# Patient Record
Sex: MALE | Race: ASIAN | NOT HISPANIC OR LATINO
[De-identification: names, ages, dates, MRNs, and addresses within clinical notes are randomized per-mention and may not be internally consistent; named-entity substitution may affect disease eponyms.]

---

## 2017-04-25 ENCOUNTER — APPOINTMENT (OUTPATIENT)
Dept: ELECTROPHYSIOLOGY | Facility: CLINIC | Age: 72
End: 2017-04-25

## 2017-10-26 ENCOUNTER — APPOINTMENT (OUTPATIENT)
Dept: ELECTROPHYSIOLOGY | Facility: CLINIC | Age: 72
End: 2017-10-26

## 2018-01-10 ENCOUNTER — APPOINTMENT (OUTPATIENT)
Dept: ELECTROPHYSIOLOGY | Facility: CLINIC | Age: 73
End: 2018-01-10

## 2018-01-24 ENCOUNTER — APPOINTMENT (OUTPATIENT)
Dept: ELECTROPHYSIOLOGY | Facility: CLINIC | Age: 73
End: 2018-01-24
Payer: MEDICARE

## 2018-01-24 VITALS — SYSTOLIC BLOOD PRESSURE: 140 MMHG | HEART RATE: 60 BPM | DIASTOLIC BLOOD PRESSURE: 88 MMHG

## 2018-01-24 PROCEDURE — 93279 PRGRMG DEV EVAL PM/LDLS PM: CPT

## 2018-04-06 ENCOUNTER — EMERGENCY (EMERGENCY)
Facility: HOSPITAL | Age: 73
LOS: 1 days | Discharge: HOME | End: 2018-04-06
Attending: EMERGENCY MEDICINE
Payer: COMMERCIAL

## 2018-04-06 VITALS
TEMPERATURE: 99 F | DIASTOLIC BLOOD PRESSURE: 92 MMHG | SYSTOLIC BLOOD PRESSURE: 153 MMHG | WEIGHT: 130.95 LBS | OXYGEN SATURATION: 98 % | HEART RATE: 60 BPM | HEIGHT: 62 IN | RESPIRATION RATE: 20 BRPM

## 2018-04-06 DIAGNOSIS — Z95.0 PRESENCE OF CARDIAC PACEMAKER: Chronic | ICD-10-CM

## 2018-04-06 DIAGNOSIS — I25.10 ATHEROSCLEROTIC HEART DISEASE OF NATIVE CORONARY ARTERY WITHOUT ANGINA PECTORIS: Chronic | ICD-10-CM

## 2018-04-06 DIAGNOSIS — Z90.49 ACQUIRED ABSENCE OF OTHER SPECIFIED PARTS OF DIGESTIVE TRACT: Chronic | ICD-10-CM

## 2018-04-06 PROCEDURE — 70486 CT MAXILLOFACIAL W/O DYE: CPT

## 2018-04-06 PROCEDURE — 70486 CT MAXILLOFACIAL W/O DYE: CPT | Mod: 26

## 2018-04-06 PROCEDURE — 99284 EMERGENCY DEPT VISIT MOD MDM: CPT | Mod: 25

## 2018-04-06 PROCEDURE — 70450 CT HEAD/BRAIN W/O DYE: CPT | Mod: 26

## 2018-04-06 PROCEDURE — 72125 CT NECK SPINE W/O DYE: CPT

## 2018-04-06 PROCEDURE — 72125 CT NECK SPINE W/O DYE: CPT | Mod: 26

## 2018-04-06 PROCEDURE — 70450 CT HEAD/BRAIN W/O DYE: CPT

## 2018-04-06 PROCEDURE — 99284 EMERGENCY DEPT VISIT MOD MDM: CPT

## 2018-04-06 NOTE — ED PROVIDER NOTE - CARE PLAN
Principal Discharge DX:	Abrasion of face, initial encounter  Secondary Diagnosis:	Sidewalk as place of occurrence of external cause  Secondary Diagnosis:	Fall, initial encounter

## 2018-04-06 NOTE — ED PROVIDER NOTE - MEDICAL DECISION MAKING DETAILS
pt is well-appearing, sustained upper lip abrasion vs. laceration listed in triage placed Bacitracin, on daily Aspirin b/l dry blood to b/l nares but no active epistaxis. No nasal bridge tenderness or facial laceration. Will CT head, XR maxillofacial and reassess. pt is well-appearing, sustained upper lip abrasion vs. laceration listed in triage placed Bacitracin, on daily Aspirin, dry blood to b/l nares no active epistaxis.  Will CT head, XR maxillofacial and reassess. No signs basilar skull, no extremity bony tenderness.

## 2018-04-06 NOTE — ED PROVIDER NOTE - NS ED MD EM SELECTION
FYI: Gretel called to cancel all her schedule B12 injections because she is having issues with her insurance [id number] Detailed

## 2018-04-06 NOTE — ED PROVIDER NOTE - PHYSICAL EXAMINATION
Abrasion to the upper lip, dry blood in the b/l nares. Steady gait, hips and pelvis are stable, -b/l log roll of hips, b/l wrist non-tender with full ROM, no cervical/midline tenderness, no facial crepitus, no hemotympanum, - raccoon eyes 1.0cm Abrasion to the upper lip, dry blood in the b/l nares L>R. Steady gait, hips and pelvis are stable, -b/l log roll of hips, b/l wrist non-tender with full ROM, no cervical/midline tenderness, no facial crepitus, no hemotympanum, - raccoon eyes

## 2018-04-06 NOTE — ED PROVIDER NOTE - PROGRESS NOTE DETAILS
Cleaned/irrigated and placed bacitracin to abrasion, instructed not to pick at dried blood, use nasal saline, gave copies of CT results, instructed to follow up with PMD with copies. Steady gait at time of discharge.

## 2018-04-06 NOTE — ED PROVIDER NOTE - ATTENDING CONTRIBUTION TO CARE
male with trip and fall.   PE:  abrassion on upper lip.  no other injuires, NC/AT,  epistaxsis; resolved. no neck pain, S1S2, no r/g/r.  A/P:  fall/abrassion . CT and tentative Pt is well appearing walking with normal gait, stable for discharge and follow up with medical doctor. Pt educated on care and need for follow up. Discussed anticipatory guidance and return precautions. Questions answered..  TD is WNL.

## 2018-04-06 NOTE — ED PROVIDER NOTE - OBJECTIVE STATEMENT
73 y/o M pt with h/o HLD, HTN, coronary artery bypass surgery , on daily Asprin, presents s/p mechanical fall today. Pt states he tripped on a pothole outside falling forward, sustaining a laceration to the upper lip, also had a nosebleed which resolved PTA. Pt denies any LOC, lightheadedness, nausea, nasal congestion or any other complaints. Pt states he is UTD with his Tetanus. NKDA. 71 y/o M pt with h/o HLD, HTN, coronary artery bypass surgery , on daily Asprin, presents s/p mechanical fall today. Pt states he tripped on a pothole outside falling forward, sustaining a laceration to the upper lip, also had a nosebleed which resolved PTA. Pt denies any LOC, lightheadedness, nausea, nasal congestion or any other complaints. Pt states he is UTD with his Tetanus. Allergic to Penicillin. 73 y/o M pt with h/o HLD, HTN, coronary artery bypass surgery , on daily Asprin, presents s/p mechanical fall today. Pt states he tripped on a pothole outside falling forward, sustaining a laceration to the upper lip, also had a nosebleed which resolved PTA. Pt denies any LOC, lightheadedness, neck/back/extremity pain, nausea/vomiting, nasal congestion or any other complaints. Pt states he is UTD with his Tetanus. Allergic to Penicillin.

## 2018-04-06 NOTE — ED PROVIDER NOTE - PSH
Coronary artery disease  CABG x 3 in 1994 in [place]  Pacemaker  see medical for information on pacemaker  S/P cholecystectomy

## 2018-04-06 NOTE — ED PROVIDER NOTE - PMH
BPH (benign prostatic hypertrophy)    Coronary artery disease    CVA (cerebral vascular accident)  10-15 years ago -- No residual  Difficult intubation  patient has notation with pacemaker information that he is a difficult intubation but no specifics given  Hyperlipidemia    Hypertension    Sinoatrial node dysfunction  guidant pacemaker  [employer] SSIRO IS-1, Comp, 1198, [id number]  leads Bipolar 3.2 LP Passive RV 52, 59 cm, 4260, [id number]  implanted oon 11/21/05 by Dr. Clark at [facility]  Snoring  SWATI precautions -- responds affirmatively to STOP BANG questionnaire -- admits to intermittent snoring; age > 50; gender, male; h/o htn  Vasovagal syncope

## 2018-06-19 ENCOUNTER — INPATIENT (INPATIENT)
Facility: HOSPITAL | Age: 73
LOS: 3 days | Discharge: HOME HEALTH | End: 2018-06-23
Attending: GENERAL PRACTICE | Admitting: GENERAL PRACTICE
Payer: MEDICARE

## 2018-06-19 ENCOUNTER — APPOINTMENT (OUTPATIENT)
Dept: ELECTROPHYSIOLOGY | Facility: CLINIC | Age: 73
End: 2018-06-19
Payer: MEDICARE

## 2018-06-19 VITALS
TEMPERATURE: 98 F | HEART RATE: 85 BPM | DIASTOLIC BLOOD PRESSURE: 79 MMHG | SYSTOLIC BLOOD PRESSURE: 109 MMHG | RESPIRATION RATE: 18 BRPM | OXYGEN SATURATION: 98 %

## 2018-06-19 VITALS — SYSTOLIC BLOOD PRESSURE: 88 MMHG | DIASTOLIC BLOOD PRESSURE: 57 MMHG | RESPIRATION RATE: 16 BRPM | HEART RATE: 60 BPM

## 2018-06-19 DIAGNOSIS — Z90.49 ACQUIRED ABSENCE OF OTHER SPECIFIED PARTS OF DIGESTIVE TRACT: Chronic | ICD-10-CM

## 2018-06-19 DIAGNOSIS — I21.3 ST ELEVATION (STEMI) MYOCARDIAL INFARCTION OF UNSPECIFIED SITE: ICD-10-CM

## 2018-06-19 DIAGNOSIS — I25.10 ATHEROSCLEROTIC HEART DISEASE OF NATIVE CORONARY ARTERY WITHOUT ANGINA PECTORIS: Chronic | ICD-10-CM

## 2018-06-19 DIAGNOSIS — Z95.0 PRESENCE OF CARDIAC PACEMAKER: Chronic | ICD-10-CM

## 2018-06-19 LAB
ALBUMIN SERPL ELPH-MCNC: 4.1 G/DL — SIGNIFICANT CHANGE UP (ref 3.3–5)
ALP SERPL-CCNC: 66 U/L — SIGNIFICANT CHANGE UP (ref 40–120)
ALT FLD-CCNC: 33 U/L — SIGNIFICANT CHANGE UP (ref 4–41)
APTT BLD: 32.2 SEC — SIGNIFICANT CHANGE UP (ref 27.5–37.4)
AST SERPL-CCNC: 125 U/L — HIGH (ref 4–40)
BASOPHILS # BLD AUTO: 0.02 K/UL — SIGNIFICANT CHANGE UP (ref 0–0.2)
BASOPHILS NFR BLD AUTO: 0.2 % — SIGNIFICANT CHANGE UP (ref 0–2)
BILIRUB SERPL-MCNC: 2.9 MG/DL — HIGH (ref 0.2–1.2)
BLD GP AB SCN SERPL QL: NEGATIVE — SIGNIFICANT CHANGE UP
BUN SERPL-MCNC: 25 MG/DL — HIGH (ref 7–23)
BUN SERPL-MCNC: 27 MG/DL — HIGH (ref 7–23)
CALCIUM SERPL-MCNC: 9 MG/DL — SIGNIFICANT CHANGE UP (ref 8.4–10.5)
CALCIUM SERPL-MCNC: 9 MG/DL — SIGNIFICANT CHANGE UP (ref 8.4–10.5)
CHLORIDE SERPL-SCNC: 96 MMOL/L — LOW (ref 98–107)
CHLORIDE SERPL-SCNC: 99 MMOL/L — SIGNIFICANT CHANGE UP (ref 98–107)
CK MB BLD-MCNC: 32.4 NG/ML — HIGH (ref 1–6.6)
CK MB BLD-MCNC: 4.7 — HIGH (ref 0–2.5)
CK SERPL-CCNC: 685 U/L — HIGH (ref 30–200)
CO2 SERPL-SCNC: 20 MMOL/L — LOW (ref 22–31)
CO2 SERPL-SCNC: 21 MMOL/L — LOW (ref 22–31)
CREAT SERPL-MCNC: 1.43 MG/DL — HIGH (ref 0.5–1.3)
CREAT SERPL-MCNC: 1.55 MG/DL — HIGH (ref 0.5–1.3)
EOSINOPHIL # BLD AUTO: 0.02 K/UL — SIGNIFICANT CHANGE UP (ref 0–0.5)
EOSINOPHIL NFR BLD AUTO: 0.2 % — SIGNIFICANT CHANGE UP (ref 0–6)
GLUCOSE SERPL-MCNC: 90 MG/DL — SIGNIFICANT CHANGE UP (ref 70–99)
GLUCOSE SERPL-MCNC: 98 MG/DL — SIGNIFICANT CHANGE UP (ref 70–99)
HCT VFR BLD CALC: 49.9 % — SIGNIFICANT CHANGE UP (ref 39–50)
HGB BLD-MCNC: 16.6 G/DL — SIGNIFICANT CHANGE UP (ref 13–17)
IMM GRANULOCYTES # BLD AUTO: 0.04 # — SIGNIFICANT CHANGE UP
IMM GRANULOCYTES NFR BLD AUTO: 0.5 % — SIGNIFICANT CHANGE UP (ref 0–1.5)
INR BLD: 1.14 — SIGNIFICANT CHANGE UP (ref 0.88–1.17)
LYMPHOCYTES # BLD AUTO: 0.79 K/UL — LOW (ref 1–3.3)
LYMPHOCYTES # BLD AUTO: 9.2 % — LOW (ref 13–44)
MCHC RBC-ENTMCNC: 28 PG — SIGNIFICANT CHANGE UP (ref 27–34)
MCHC RBC-ENTMCNC: 33.3 % — SIGNIFICANT CHANGE UP (ref 32–36)
MCV RBC AUTO: 84.3 FL — SIGNIFICANT CHANGE UP (ref 80–100)
MONOCYTES # BLD AUTO: 0.43 K/UL — SIGNIFICANT CHANGE UP (ref 0–0.9)
MONOCYTES NFR BLD AUTO: 5 % — SIGNIFICANT CHANGE UP (ref 2–14)
NEUTROPHILS # BLD AUTO: 7.28 K/UL — SIGNIFICANT CHANGE UP (ref 1.8–7.4)
NEUTROPHILS NFR BLD AUTO: 84.9 % — HIGH (ref 43–77)
NRBC # FLD: 0 — SIGNIFICANT CHANGE UP
PLATELET # BLD AUTO: 143 K/UL — LOW (ref 150–400)
PMV BLD: 10.8 FL — SIGNIFICANT CHANGE UP (ref 7–13)
POTASSIUM SERPL-MCNC: 4.4 MMOL/L — SIGNIFICANT CHANGE UP (ref 3.5–5.3)
POTASSIUM SERPL-MCNC: SIGNIFICANT CHANGE UP MMOL/L (ref 3.5–5.3)
POTASSIUM SERPL-SCNC: 4.4 MMOL/L — SIGNIFICANT CHANGE UP (ref 3.5–5.3)
POTASSIUM SERPL-SCNC: SIGNIFICANT CHANGE UP MMOL/L (ref 3.5–5.3)
PROT SERPL-MCNC: 8.4 G/DL — HIGH (ref 6–8.3)
PROTHROM AB SERPL-ACNC: 13.2 SEC — HIGH (ref 9.8–13.1)
RBC # BLD: 5.92 M/UL — HIGH (ref 4.2–5.8)
RBC # FLD: 13.5 % — SIGNIFICANT CHANGE UP (ref 10.3–14.5)
RH IG SCN BLD-IMP: POSITIVE — SIGNIFICANT CHANGE UP
SODIUM SERPL-SCNC: 130 MMOL/L — LOW (ref 135–145)
SODIUM SERPL-SCNC: 135 MMOL/L — SIGNIFICANT CHANGE UP (ref 135–145)
TROPONIN T, HIGH SENSITIVITY RESULT: 2887 NG/L — CRITICAL HIGH (ref ?–14)
WBC # BLD: 8.58 K/UL — SIGNIFICANT CHANGE UP (ref 3.8–10.5)
WBC # FLD AUTO: 8.58 K/UL — SIGNIFICANT CHANGE UP (ref 3.8–10.5)

## 2018-06-19 PROCEDURE — 93279 PRGRMG DEV EVAL PM/LDLS PM: CPT

## 2018-06-19 RX ORDER — HEPARIN SODIUM 5000 [USP'U]/ML
4000 INJECTION INTRAVENOUS; SUBCUTANEOUS ONCE
Qty: 0 | Refills: 0 | Status: COMPLETED | OUTPATIENT
Start: 2018-06-19 | End: 2018-06-19

## 2018-06-19 RX ORDER — HEPARIN SODIUM 5000 [USP'U]/ML
INJECTION INTRAVENOUS; SUBCUTANEOUS
Qty: 25000 | Refills: 0 | Status: DISCONTINUED | OUTPATIENT
Start: 2018-06-19 | End: 2018-06-19

## 2018-06-19 RX ORDER — HEPARIN SODIUM 5000 [USP'U]/ML
4000 INJECTION INTRAVENOUS; SUBCUTANEOUS EVERY 6 HOURS
Qty: 0 | Refills: 0 | Status: DISCONTINUED | OUTPATIENT
Start: 2018-06-19 | End: 2018-06-19

## 2018-06-19 RX ORDER — SODIUM CHLORIDE 9 MG/ML
500 INJECTION INTRAMUSCULAR; INTRAVENOUS; SUBCUTANEOUS
Qty: 0 | Refills: 0 | Status: DISCONTINUED | OUTPATIENT
Start: 2018-06-19 | End: 2018-06-23

## 2018-06-19 RX ORDER — TICAGRELOR 90 MG/1
90 TABLET ORAL
Qty: 0 | Refills: 0 | Status: DISCONTINUED | OUTPATIENT
Start: 2018-06-19 | End: 2018-06-23

## 2018-06-19 RX ORDER — TAMSULOSIN HYDROCHLORIDE 0.4 MG/1
0.4 CAPSULE ORAL AT BEDTIME
Qty: 0 | Refills: 0 | Status: DISCONTINUED | OUTPATIENT
Start: 2018-06-19 | End: 2018-06-23

## 2018-06-19 RX ORDER — SODIUM CHLORIDE 9 MG/ML
3 INJECTION INTRAMUSCULAR; INTRAVENOUS; SUBCUTANEOUS EVERY 8 HOURS
Qty: 0 | Refills: 0 | Status: DISCONTINUED | OUTPATIENT
Start: 2018-06-19 | End: 2018-06-23

## 2018-06-19 RX ORDER — METOPROLOL TARTRATE 50 MG
50 TABLET ORAL DAILY
Qty: 0 | Refills: 0 | Status: DISCONTINUED | OUTPATIENT
Start: 2018-06-19 | End: 2018-06-20

## 2018-06-19 RX ORDER — ATORVASTATIN CALCIUM 80 MG/1
80 TABLET, FILM COATED ORAL AT BEDTIME
Qty: 0 | Refills: 0 | Status: DISCONTINUED | OUTPATIENT
Start: 2018-06-19 | End: 2018-06-23

## 2018-06-19 RX ORDER — SODIUM CHLORIDE 9 MG/ML
500 INJECTION INTRAMUSCULAR; INTRAVENOUS; SUBCUTANEOUS
Qty: 0 | Refills: 0 | Status: DISCONTINUED | OUTPATIENT
Start: 2018-06-19 | End: 2018-06-19

## 2018-06-19 RX ORDER — NITROGLYCERIN 6.5 MG
0.4 CAPSULE, EXTENDED RELEASE ORAL
Qty: 0 | Refills: 0 | Status: DISCONTINUED | OUTPATIENT
Start: 2018-06-19 | End: 2018-06-21

## 2018-06-19 RX ORDER — TICAGRELOR 90 MG/1
180 TABLET ORAL ONCE
Qty: 0 | Refills: 0 | Status: COMPLETED | OUTPATIENT
Start: 2018-06-19 | End: 2018-06-19

## 2018-06-19 RX ORDER — FENTANYL CITRATE 50 UG/ML
25 INJECTION INTRAVENOUS ONCE
Qty: 0 | Refills: 0 | Status: DISCONTINUED | OUTPATIENT
Start: 2018-06-19 | End: 2018-06-19

## 2018-06-19 RX ORDER — AMLODIPINE BESYLATE 2.5 MG/1
5 TABLET ORAL DAILY
Qty: 0 | Refills: 0 | Status: DISCONTINUED | OUTPATIENT
Start: 2018-06-19 | End: 2018-06-20

## 2018-06-19 RX ORDER — ASPIRIN/CALCIUM CARB/MAGNESIUM 324 MG
81 TABLET ORAL DAILY
Qty: 0 | Refills: 0 | Status: DISCONTINUED | OUTPATIENT
Start: 2018-06-20 | End: 2018-06-23

## 2018-06-19 RX ADMIN — FENTANYL CITRATE 25 MICROGRAM(S): 50 INJECTION INTRAVENOUS at 19:04

## 2018-06-19 RX ADMIN — SODIUM CHLORIDE 3 MILLILITER(S): 9 INJECTION INTRAMUSCULAR; INTRAVENOUS; SUBCUTANEOUS at 20:26

## 2018-06-19 RX ADMIN — HEPARIN SODIUM 800 UNIT(S)/HR: 5000 INJECTION INTRAVENOUS; SUBCUTANEOUS at 12:19

## 2018-06-19 RX ADMIN — SODIUM CHLORIDE 75 MILLILITER(S): 9 INJECTION INTRAMUSCULAR; INTRAVENOUS; SUBCUTANEOUS at 16:12

## 2018-06-19 RX ADMIN — ATORVASTATIN CALCIUM 80 MILLIGRAM(S): 80 TABLET, FILM COATED ORAL at 21:48

## 2018-06-19 RX ADMIN — FENTANYL CITRATE 25 MICROGRAM(S): 50 INJECTION INTRAVENOUS at 18:15

## 2018-06-19 RX ADMIN — TAMSULOSIN HYDROCHLORIDE 0.4 MILLIGRAM(S): 0.4 CAPSULE ORAL at 21:48

## 2018-06-19 RX ADMIN — HEPARIN SODIUM 4000 UNIT(S): 5000 INJECTION INTRAVENOUS; SUBCUTANEOUS at 12:19

## 2018-06-19 RX ADMIN — SODIUM CHLORIDE 75 MILLILITER(S): 9 INJECTION INTRAMUSCULAR; INTRAVENOUS; SUBCUTANEOUS at 20:24

## 2018-06-19 RX ADMIN — TICAGRELOR 180 MILLIGRAM(S): 90 TABLET ORAL at 12:18

## 2018-06-19 RX ADMIN — TICAGRELOR 90 MILLIGRAM(S): 90 TABLET ORAL at 21:48

## 2018-06-19 NOTE — ED ADULT NURSE NOTE - OBJECTIVE STATEMENT
Pt rec'd to ED TR B c/o midsternal chest rpessure on exertion x2 days, nonradiating, lasting <5min, improvement with rest. Woke up this morning with nausea, denies vomiting. Arrives to room AOx4, respirations even and unlabored, denying diaphoresis/ CP/ SOB/ N/V. EKG complete at triage- STEMI- cardiology notified and brought stat to bedside. Charge RN made aware. Meds admin as per EMAR. Brought to cath lab on zoll accompanied by ED tech and ISSAC Carpio.

## 2018-06-19 NOTE — ED PROVIDER NOTE - ATTENDING CONTRIBUTION TO CARE
Dr. Nunez: I have personally seen and examined this patient at the bedside. I have fully participated in the care of this patient. I have reviewed all pertinent clinical information, including history, physical exam, plan and the Resident's note and agree except as noted. HPI above as by me. PE above as by me. STEMI PLAN STEMI alert, heparin, cath lab.

## 2018-06-19 NOTE — CHART NOTE - NSCHARTNOTEFT_GEN_A_CORE
In short, called by ED at 11:51 am by ED for EKG concerning of STEMI. Briefly, this is a 72 year old patient with history of CABG 1994, PPM who presents with complaints of chest pain of 2 days. He complaints of exertional chest pain x 2 days. He describes it as midsternal pressure for a few minutes. She denies nausea, SOB, diaphoresis. Patient took 1 baby asa prior to arrival. In the ED, he is without any symptoms, denies any chest pain which last occurred at rest at 3 pm yesterday.   VS with /70, with HR in 70s.   EKG: Sinus rhythm at 60 with LA enlargement, LAD. Patient has CHRISTEN of 1-2 mm in lead II, III, avF with waves. Additionally, flipped T wave with 1-2 mm depression in I and aVL. Flipped T waves in lateral segments.   A/P: 72 year old man with prior CABG with late presenting MI. Chest pain free at the moment and he is more than 24 hours out from his primary injury. He is not a candidate for emergent primary PCI at this time but should undergo invasive testing today. Patient's interventional cardiologist Dr. Casarez notified, who is planning on proceeding with cardiac catheterization today.  -[facility] today  -DAPT load with aspirin and ticagrelor in the ED, heparin gtt started   -Case discussed with Dr. Groves   -Defer rest of plan to Dr. Casarez

## 2018-06-19 NOTE — H&P CARDIOLOGY - NEGATIVE CARDIOVASCULAR SYMPTOMS
no palpitations/no dyspnea on exertion/no paroxysmal nocturnal dyspnea/no peripheral edema/no orthopnea/no claudication

## 2018-06-19 NOTE — ED PROVIDER NOTE - PROGRESS NOTE DETAILS
Mayra att: Per cards fellow EKG reviewed by Interventional Cardiology Att who notes q-waves on EKG and suspects sub-acute MI. Interventional Cardio Att is discussing case with patient's primary Cardiologist. Mayra att: 11:47 STEMI alert activated. EKG faxed to [email or web address]. 12:00 Patient seen by Cards fellow. Fellow walked EKG up to Cards Att. 12:11 Per cards fellow EKG reviewed by Interventional Cardiology Att who notes q-waves on EKG and suspects sub-acute MI. Interventional Cardio Att is discussing case with patient's primary Cardiologist Dr. Casarez (an Interventional Cardiologist). Mayra att: Per House Cards Dr. Casarez en route from [facility] to take patient to cath lab. House Cards suspects patient sustained MI 24 hours and presents cp free today. Dr. Casarez contacted whether patient is CCU admission. Dr. Casarez requests Dr. Lemons be contacted for admission. Mayra att: 11:47 STEMI alert activated. EKG faxed to [email or web address]. 12:00 Patient seen by Cards fellow. Fellow walked EKG up to Cards Att. 12:11 Per cards fellow EKG reviewed by Interventional Cardiology Att who notes q-waves on EKG and suspects sub-acute MI. Interventional Cardio Att is discussing case with patient's primary Cardiologist Dr. Casarez (an Interventional Cardiologist). Patient written for asa, brilinta, and hep gtt by me. Mayra att: Per House Cards Dr. Casarez en route from [facility] to take patient to cath lab. House Cards suspects patient sustained MI 24 hours and as he presents cp free today. Dr. Casarez contacted whether patient is CCU admission. Dr. Casarez requests Dr. Lemons be contacted for admission. [facility] accepted patient.

## 2018-06-19 NOTE — H&P CARDIOLOGY - NEGATIVE NEUROLOGICAL SYMPTOMS
no generalized seizures/no focal seizures/no paresthesias/no syncope/no headache/no weakness/no tremors/no vertigo/no loss of sensation/no loss of consciousness/no hemiparesis/no facial palsy/no difficulty walking/no confusion/no transient paralysis

## 2018-06-19 NOTE — ED PROVIDER NOTE - OBJECTIVE STATEMENT
12:05 Mayra att: 72M h/o 1998 cabg c/o exertional cp  x 2 days. Since yesterday patient notes exertional cp, midsternal, presssure, x 4 min, nonrad, resovles with rest. Denies n, sob, diaphoresis. Today 3A patient woke up from sleep acute nausea. Patient arrived to ER by car, denies active cp. STEMI alert activated upon receipt of EKG for concern inferior wall MI. Patient took 1 baby asa prior to arrival. ALL pcn PMH cad, cabg, ppm PSH cabg 12:05 Mayra att: 72M h/o 1998 cabg c/o exertional cp  x 2 days. Notified by Intake att STEMI ekg, cath consult called. Patient notes since yesterday exertional cp, midsternal, presssure, x 4 min, nonrad, resovles with rest. Denies n, sob, diaphoresis. Today 3A patient woke up from sleep acute nausea. Patient arrived to ER by car, denies active cp. STEMI alert activated upon receipt of EKG for concern inferior wall MI. Patient took 1 baby asa prior to arrival. ALL pcn PMH cad, cabg, ppm PSH cabg

## 2018-06-19 NOTE — H&P CARDIOLOGY - PMH
BPH (benign prostatic hypertrophy)    Coronary artery disease    CVA (cerebral vascular accident)  10-15 years ago -- No residual  Difficult intubation  patient has notation with pacemaker information that he is a difficult intubation but no specifics given  Hyperlipidemia    Hypertension    Sinoatrial node dysfunction  guidant pacemaker  [employer] I [employer]ra SSIRO IS-1, Comp, 1198, [id number]  leads Bipolar 3.2 LP Passive RV 52, 59 cm, 4260, [id number]  implanted oon 11/21/05 by Dr. Clark at [facility]  Snoring  SWATI precautions -- responds affirmatively to STOP BANG questionnaire -- admits to intermittent snoring; age > 50; gender, male; h/o htn  Vasovagal syncope

## 2018-06-19 NOTE — H&P CARDIOLOGY - RS GEN PE MLT RESP DETAILS PC
no chest wall tenderness/respirations non-labored/clear to auscultation bilaterally/breath sounds equal/good air movement/airway patent

## 2018-06-19 NOTE — H&P CARDIOLOGY - HISTORY OF PRESENT ILLNESS
73 y/o M w/ PMH of CABG x3(1998) and PPM c/o exertional chest pain  x 2 days. Today 3AM patient woke up from sleep acute nausea. STEMI alert activated upon receipt of EKG for concern inferior wall MI. Pt seen by cardiology fellow and 2/2 him being chest pain free at the moment and late presentation pt not emergently brought to cath lab. Pt will have angiogram with Dr. Casarez today.

## 2018-06-19 NOTE — ED PROVIDER NOTE - PMH
BPH (benign prostatic hypertrophy)    Coronary artery disease    CVA (cerebral vascular accident)  10-15 years ago -- No residual  Difficult intubation  patient has notation with pacemaker information that he is a difficult intubation but no specifics given  Hyperlipidemia    Hypertension    Sinoatrial node dysfunction  guidant pacemaker  [employer] I Entra SSIRO IS-1, Comp, 1198, [id number]  leads Bipolar 3.2 LP Passive RV 52, 59 cm, 4260, [id number]  implanted oon 11/21/05 by Dr. Clark at [facility]  Snoring  SWATI precautions -- responds affirmatively to STOP BANG questionnaire -- admits to intermittent snoring; age > 50; gender, male; h/o htn  Vasovagal syncope

## 2018-06-20 LAB
BLD GP AB SCN SERPL QL: NEGATIVE — SIGNIFICANT CHANGE UP
BUN SERPL-MCNC: 21 MG/DL — SIGNIFICANT CHANGE UP (ref 7–23)
BUN SERPL-MCNC: 25 MG/DL — HIGH (ref 7–23)
CALCIUM SERPL-MCNC: 8.3 MG/DL — LOW (ref 8.4–10.5)
CALCIUM SERPL-MCNC: 8.6 MG/DL — SIGNIFICANT CHANGE UP (ref 8.4–10.5)
CHLORIDE SERPL-SCNC: 100 MMOL/L — SIGNIFICANT CHANGE UP (ref 98–107)
CHLORIDE SERPL-SCNC: 101 MMOL/L — SIGNIFICANT CHANGE UP (ref 98–107)
CK MB BLD-MCNC: 20.51 NG/ML — HIGH (ref 1–6.6)
CK MB BLD-MCNC: 25.37 NG/ML — HIGH (ref 1–6.6)
CK MB BLD-MCNC: 5.6 — HIGH (ref 0–2.5)
CK SERPL-CCNC: 414 U/L — HIGH (ref 30–200)
CK SERPL-CCNC: 453 U/L — HIGH (ref 30–200)
CO2 SERPL-SCNC: 18 MMOL/L — LOW (ref 22–31)
CO2 SERPL-SCNC: 20 MMOL/L — LOW (ref 22–31)
CREAT SERPL-MCNC: 1.08 MG/DL — SIGNIFICANT CHANGE UP (ref 0.5–1.3)
CREAT SERPL-MCNC: 1.12 MG/DL — SIGNIFICANT CHANGE UP (ref 0.5–1.3)
GLUCOSE SERPL-MCNC: 90 MG/DL — SIGNIFICANT CHANGE UP (ref 70–99)
GLUCOSE SERPL-MCNC: 92 MG/DL — SIGNIFICANT CHANGE UP (ref 70–99)
HCT VFR BLD CALC: 39.6 % — SIGNIFICANT CHANGE UP (ref 39–50)
HCT VFR BLD CALC: 41.8 % — SIGNIFICANT CHANGE UP (ref 39–50)
HCT VFR BLD CALC: 48.9 % — SIGNIFICANT CHANGE UP (ref 39–50)
HGB BLD-MCNC: 13.2 G/DL — SIGNIFICANT CHANGE UP (ref 13–17)
HGB BLD-MCNC: 13.7 G/DL — SIGNIFICANT CHANGE UP (ref 13–17)
HGB BLD-MCNC: 15.9 G/DL — SIGNIFICANT CHANGE UP (ref 13–17)
MAGNESIUM SERPL-MCNC: 2.1 MG/DL — SIGNIFICANT CHANGE UP (ref 1.6–2.6)
MAGNESIUM SERPL-MCNC: 2.1 MG/DL — SIGNIFICANT CHANGE UP (ref 1.6–2.6)
MCHC RBC-ENTMCNC: 27.8 PG — SIGNIFICANT CHANGE UP (ref 27–34)
MCHC RBC-ENTMCNC: 28.3 PG — SIGNIFICANT CHANGE UP (ref 27–34)
MCHC RBC-ENTMCNC: 28.4 PG — SIGNIFICANT CHANGE UP (ref 27–34)
MCHC RBC-ENTMCNC: 32.5 % — SIGNIFICANT CHANGE UP (ref 32–36)
MCHC RBC-ENTMCNC: 32.8 % — SIGNIFICANT CHANGE UP (ref 32–36)
MCHC RBC-ENTMCNC: 33.3 % — SIGNIFICANT CHANGE UP (ref 32–36)
MCV RBC AUTO: 83.4 FL — SIGNIFICANT CHANGE UP (ref 80–100)
MCV RBC AUTO: 86.5 FL — SIGNIFICANT CHANGE UP (ref 80–100)
MCV RBC AUTO: 87 FL — SIGNIFICANT CHANGE UP (ref 80–100)
NRBC # FLD: 0 — SIGNIFICANT CHANGE UP
PHOSPHATE SERPL-MCNC: 2.5 MG/DL — SIGNIFICANT CHANGE UP (ref 2.5–4.5)
PHOSPHATE SERPL-MCNC: 3.1 MG/DL — SIGNIFICANT CHANGE UP (ref 2.5–4.5)
PLATELET # BLD AUTO: 126 K/UL — LOW (ref 150–400)
PLATELET # BLD AUTO: 128 K/UL — LOW (ref 150–400)
PLATELET # BLD AUTO: 131 K/UL — LOW (ref 150–400)
PMV BLD: 10.8 FL — SIGNIFICANT CHANGE UP (ref 7–13)
PMV BLD: 10.9 FL — SIGNIFICANT CHANGE UP (ref 7–13)
PMV BLD: 11 FL — SIGNIFICANT CHANGE UP (ref 7–13)
POTASSIUM SERPL-MCNC: 4.1 MMOL/L — SIGNIFICANT CHANGE UP (ref 3.5–5.3)
POTASSIUM SERPL-MCNC: 4.1 MMOL/L — SIGNIFICANT CHANGE UP (ref 3.5–5.3)
POTASSIUM SERPL-SCNC: 4.1 MMOL/L — SIGNIFICANT CHANGE UP (ref 3.5–5.3)
POTASSIUM SERPL-SCNC: 4.1 MMOL/L — SIGNIFICANT CHANGE UP (ref 3.5–5.3)
RBC # BLD: 4.75 M/UL — SIGNIFICANT CHANGE UP (ref 4.2–5.8)
RBC # BLD: 4.83 M/UL — SIGNIFICANT CHANGE UP (ref 4.2–5.8)
RBC # BLD: 5.62 M/UL — SIGNIFICANT CHANGE UP (ref 4.2–5.8)
RBC # FLD: 13.4 % — SIGNIFICANT CHANGE UP (ref 10.3–14.5)
RBC # FLD: 13.5 % — SIGNIFICANT CHANGE UP (ref 10.3–14.5)
RBC # FLD: 13.6 % — SIGNIFICANT CHANGE UP (ref 10.3–14.5)
RH IG SCN BLD-IMP: POSITIVE — SIGNIFICANT CHANGE UP
SODIUM SERPL-SCNC: 135 MMOL/L — SIGNIFICANT CHANGE UP (ref 135–145)
SODIUM SERPL-SCNC: 136 MMOL/L — SIGNIFICANT CHANGE UP (ref 135–145)
TROPONIN T, HIGH SENSITIVITY RESULT: 3212 NG/L — CRITICAL HIGH (ref ?–14)
TROPONIN T, HIGH SENSITIVITY: 3408 NG/L — CRITICAL HIGH (ref ?–14)
WBC # BLD: 6.82 K/UL — SIGNIFICANT CHANGE UP (ref 3.8–10.5)
WBC # BLD: 7.69 K/UL — SIGNIFICANT CHANGE UP (ref 3.8–10.5)
WBC # BLD: 9.17 K/UL — SIGNIFICANT CHANGE UP (ref 3.8–10.5)
WBC # FLD AUTO: 6.82 K/UL — SIGNIFICANT CHANGE UP (ref 3.8–10.5)
WBC # FLD AUTO: 7.69 K/UL — SIGNIFICANT CHANGE UP (ref 3.8–10.5)
WBC # FLD AUTO: 9.17 K/UL — SIGNIFICANT CHANGE UP (ref 3.8–10.5)

## 2018-06-20 PROCEDURE — 93306 TTE W/DOPPLER COMPLETE: CPT | Mod: 26

## 2018-06-20 RX ORDER — NOREPINEPHRINE BITARTRATE/D5W 8 MG/250ML
0.05 PLASTIC BAG, INJECTION (ML) INTRAVENOUS
Qty: 8 | Refills: 0 | Status: DISCONTINUED | OUTPATIENT
Start: 2018-06-20 | End: 2018-06-21

## 2018-06-20 RX ADMIN — Medication 81 MILLIGRAM(S): at 08:36

## 2018-06-20 RX ADMIN — ATORVASTATIN CALCIUM 80 MILLIGRAM(S): 80 TABLET, FILM COATED ORAL at 21:47

## 2018-06-20 RX ADMIN — TICAGRELOR 90 MILLIGRAM(S): 90 TABLET ORAL at 06:12

## 2018-06-20 RX ADMIN — SODIUM CHLORIDE 3 MILLILITER(S): 9 INJECTION INTRAMUSCULAR; INTRAVENOUS; SUBCUTANEOUS at 21:44

## 2018-06-20 RX ADMIN — TAMSULOSIN HYDROCHLORIDE 0.4 MILLIGRAM(S): 0.4 CAPSULE ORAL at 21:47

## 2018-06-20 RX ADMIN — Medication 50 MILLIGRAM(S): at 06:12

## 2018-06-20 RX ADMIN — TICAGRELOR 90 MILLIGRAM(S): 90 TABLET ORAL at 18:07

## 2018-06-20 RX ADMIN — AMLODIPINE BESYLATE 5 MILLIGRAM(S): 2.5 TABLET ORAL at 06:12

## 2018-06-20 RX ADMIN — Medication 1 DROP(S): at 21:47

## 2018-06-20 RX ADMIN — SODIUM CHLORIDE 3 MILLILITER(S): 9 INJECTION INTRAMUSCULAR; INTRAVENOUS; SUBCUTANEOUS at 13:20

## 2018-06-20 RX ADMIN — SODIUM CHLORIDE 3 MILLILITER(S): 9 INJECTION INTRAMUSCULAR; INTRAVENOUS; SUBCUTANEOUS at 03:34

## 2018-06-20 RX ADMIN — Medication 5.34 MICROGRAM(S)/KG/MIN: at 12:20

## 2018-06-20 NOTE — PROGRESS NOTE ADULT - ASSESSMENT
72M with PMHx of CABG x3(1998) and PPM presenting with exertional chest pain  x 2 days admitted with EKG evidence of inferior wall STEMI, late presentation, LHC performed with evidence of ___ course complicated by RT groin hematoma, requiring IV pressor support, Hg stable 72M with PMHx of CABG x3(1998) and PPM ([place] [facility]), CVA (2010), HTN presenting with exertional chest pain  x 2 days admitted with EKG evidence of inferior wall STEMI, late presentation, LHC performed with evidence of total occlusion of SVG to PDA with thrombus and severe disease of SVG to OM1 at anastomotic site, PTCI of total SVG occlusion unsuccessful, course complicated by R groin hematoma, requiring IV pressor support.    #Neuro  AOx3, mentating well   Non focal exam, no active issues   Past CVA on ASA and stain     #CV  Patient with past CABG in 1998 and PPM([place] [facility])  Being Vpaced, HR 60s  Patient presented with nausea and chest pain, late presentation EKG evidence of inferior wall STEMI, T troponin elevated to 2K  EKG on presentation sinus rhythm at 60 with LA enlargement, LAD. Patient has CHRISTEN of 1-2 mm in lead II, III, avF with waves. Additionally, flipped T wave with 1-2 mm depression in I and aVL. Flipped T waves in lateral segment  LHC evidence of evidence of total occlusion of SVG to PDA with thrombus and severe disease of SVG to OM1 at anastomotic site, PTCI of total SVG occlusion unsuccessful, complicated by R groin hematoma, requiring IV pressor support  MAP >65   Will wean Levophed as tolerates, mentating well   Hold home anti-HTN meds in the setting of critical patient   Continue ASA and Brilinta full dose   Continue high intensity statin       #Resp  On RA, no active issues     #Heme/Onc  R groin hematoma noted on physical exam  Hg stable, will monitor CBC q8 hours, then likely can do q12 if stable  Hg goal >8 given severity of CAD   Active T+S  If continues to expand, Hg downtrending HD unstable, will get further imagining     #PPX  DVT: hold in the setting of active bleeding  Diet: Regular diet   No GI ppx needed 72M with PMHx of CABG x3(1998) and PPM ([place] [facility]), CVA (2010), HTN presenting with exertional chest pain  x 2 days admitted with EKG evidence of inferior wall STEMI, late presentation, LHC performed with evidence of total occlusion of SVG to PDA with thrombus and severe disease of SVG to OM1 at anastomotic site, PTCI of total SVG occlusion unsuccessful, course complicated by R groin hematoma, requiring IV pressor support.    #Neuro  AOx3, mentating well   Non focal exam, no active issues   Past CVA on ASA and stain     #CV  Patient with past CABG in 1998 and PPM([place] [facility])  Being Vpaced, HR 60s  Patient presented with nausea and chest pain, late presentation EKG evidence of inferior wall STEMI, T troponin elevated to 2K  EKG on presentation sinus rhythm at 60 with LA enlargement, LAD. Patient has CHRISTEN of 1-2 mm in lead II, III, avF with waves. Additionally, flipped T wave with 1-2 mm depression in I and aVL. Flipped T waves in lateral segment  LHC evidence of evidence of total occlusion of SVG to PDA with thrombus and severe disease of SVG to OM1 at anastomotic site, PTCI of total SVG occlusion unsuccessful, complicated by R groin hematoma, requiring IV pressor support  MAP >65   Will wean Levophed as tolerates, mentating well   Hold home anti-HTN meds in the setting of critical patient   Continue ASA and Brilinta full dose   Continue high intensity statin   TTE in 2016 EF preserved at 68%       #Resp  On RA, no active issues     #Heme/Onc  R groin hematoma noted on physical exam  Hg stable, will monitor CBC q8 hours, then likely can do q12 if stable  Hg goal >8 given severity of CAD   Active T+S  If continues to expand, Hg downtrending HD unstable, will get further imagining     #PPX  DVT: hold in the setting of active bleeding  Diet: Regular diet   No GI ppx needed 72M with PMHx of CABG x3(1998) and PPM ([place] [facility]), CVA (2010), HTN presenting with exertional chest pain  x 2 days admitted with EKG evidence of inferior wall STEMI, late presentation, LHC performed with evidence of total occlusion of SVG to PDA with thrombus and severe disease of SVG to OM1 at anastomotic site, PTCI of total SVG occlusion unsuccessful, course complicated by R groin hematoma, requiring IV pressor support.    #Neuro  AOx3, mentating well   Non focal exam, no active issues   Past CVA on ASA and stain     #CV  Patient with past CABG in 1998 and PPM([place] [facility])  Being Vpaced, HR 60s  Patient presented with nausea and chest pain, late presentation EKG evidence of inferior wall STEMI, T troponin elevated to 2K  EKG on presentation sinus rhythm at 60 with LA enlargement, LAD. Patient has CHRISTEN of 1-2 mm in lead II, III, avF with waves. Additionally, flipped T wave with 1-2 mm depression in I and aVL. Flipped T waves in lateral segment  LHC evidence of evidence of total occlusion of SVG to PDA with thrombus and severe disease of SVG to OM1 at anastomotic site, PTCI of total SVG occlusion unsuccessful, complicated by R groin hematoma, requiring IV pressor support  MAP >65   Will wean Levophed as tolerates, mentating well   Hold home anti-HTN meds in the setting of critical patient   Continue ASA and Brilinta full dose   Continue high intensity statin   Trend CE  TTE in 2016 EF preserved at 68%, now 40-45%  Plan for possible repeat angiogram in the AM, NPO after midnight       #Resp  On RA, no active issues     #Heme/Onc  R groin hematoma noted on physical exam  Hg stable, will monitor CBC q8 hours, then likely can do q12 if stable  Hg goal >8 given severity of CAD   Active T+S  If continues to expand, Hg downtrending HD unstable, will get further imagining     #PPX  DVT: hold in the setting of active bleeding  Diet: Regular diet   No GI ppx needed 72M with PMHx of CABG x3(1998) and PPM ([place] [facility]), CVA (2010), HTN presenting with exertional chest pain  x 2 days admitted with EKG evidence of inferior wall STEMI, late presentation, LHC performed with evidence of total occlusion of SVG to PDA with thrombus and severe disease of SVG to OM1 at anastomotic site, PTCI of total SVG occlusion unsuccessful, course complicated by R groin hematoma, requiring IV pressor support.    #Neuro  AOx3, mentating well   Non focal exam, no active issues   Past CVA on ASA and stain     #CV  Patient with past CABG in 1998 and PPM([place] [facility])  Being Vpaced, HR 60s  Patient presented with nausea and chest pain, late presentation EKG evidence of inferior wall STEMI, T troponin elevated to 2K  EKG on presentation sinus rhythm at 60 with LA enlargement, LAD. Patient has CHRITSEN of 1-2 mm in lead II, III, avF with waves. Additionally, flipped T wave with 1-2 mm depression in I and aVL. Flipped T waves in lateral segment  LHC evidence of evidence of total occlusion of SVG to PDA with thrombus and severe disease of SVG to OM1 at anastomotic site, PTCI of total SVG occlusion unsuccessful, complicated by R groin hematoma, requiring IV pressor support  MAP >65   Will wean Levophed as tolerates, mentating well   Hold home anti-HTN meds in the setting of critical patient   Continue ASA and Brilinta full dose   Continue high intensity statin   Trend CE  TTE in 2016 EF preserved at 68%, pending repeat TTE   Plan for possible repeat angiogram in the AM, NPO after midnight       #Resp  On RA, no active issues     #Heme/Onc  R groin hematoma noted on physical exam  Hg stable, will monitor CBC q8 hours, then likely can do q12 if stable  Hg goal >8 given severity of CAD   Active T+S  If continues to expand, Hg downtrending HD unstable, will get further imagining     #PPX  DVT: hold in the setting of active bleeding  Diet: Regular diet   No GI ppx needed

## 2018-06-20 NOTE — PROGRESS NOTE ADULT - SUBJECTIVE AND OBJECTIVE BOX
ACCEPT NOTE: CCU     HPI:  73 y/o M w/ PMH of CABG x3() and PPM c/o exertional chest pain  x 2 days. Today 3AM patient woke up from sleep acute nausea. STEMI alert activated upon receipt of EKG for concern inferior wall MI. Pt seen by cardiology fellow and 2/2 him being chest pain free at the moment and late presentation pt not emergently brought to cath lab. Pt will have angiogram with Dr. Casarez today. (2018 12:53)      SUBJECTIVE:  Patient is a 72y old  Male who presents with a chief complaint of chest pain         OBJECTIVE:  Review Of Systems:  Constitutional: [ ] Fever [ ] Chills [ ] Fatigue [ ] Weight change   HEENT: [ ] Blurred vision [ ] Eye Pain [ ] Headache [ ] Runny nose [ ] Sore Throat   Respiratory: [ ] Cough [ ] Wheezing [ ] Shortness of breath  Cardiovascular: [ ] Chest Pain [ ] Palpitations [ ] CHARLES [ ] PND [ ] Orthopnea  Gastrointestinal: [ ] Abdominal Pain [ ] Diarrhea [ ] Constipation [ ] Hemorrhoids [ ] Nausea [ ] Vomiting  Genitourinary: [ ] Nocturia [ ] Dysuria [ ] Incontinence  Extremities: [ ] Swelling [ ] Joint Pain  Neurologic: [ ] Focal deficit [ ] Paresthesias [ ] Syncope  Lymphatic: [ ] Swelling [ ] Lymphadenopathy   Skin: [ ] Rash [ ] Ecchymoses [ ] Wounds [ ] Lesions  Psychiatry: [ ] Depression [ ] Suicidal/Homicidal Ideation [ ] Anxiety [ ] Sleep Disturbances  [ ] 10 point review of systems is otherwise negative except as mentioned above            [ ]Unable to obtain    Allergy:  Allergies    penicillin (Other)    Intolerances        Medications:  MEDICATIONS  (STANDING):  amLODIPine   Tablet 5 milliGRAM(s) Oral daily  aspirin enteric coated 81 milliGRAM(s) Oral daily  atorvastatin 80 milliGRAM(s) Oral at bedtime  metoprolol succinate ER 50 milliGRAM(s) Oral daily  sodium chloride 0.9% lock flush 3 milliLiter(s) IV Push every 8 hours  sodium chloride 0.9%. 500 milliLiter(s) (75 mL/Hr) IV Continuous <Continuous>  tamsulosin 0.4 milliGRAM(s) Oral at bedtime  ticagrelor 90 milliGRAM(s) Oral two times a day    MEDICATIONS  (PRN):  nitroglycerin     SubLingual 0.4 milliGRAM(s) SubLingual every 5 minutes PRN Chest Pain      PMH/PSH/FH/SH: [ ] Unchanged    Vitals:  T(C): 37.2 (18 @ 05:55), Max: 37.2 (18 @ 05:55)  HR: 64 (18 @ 05:55) (64 - 85)  BP: 129/83 (18 @ 05:55) (109/79 - 136/86)  BP(mean): --  RR: 18 (18 @ 05:55) (18 - 18)  SpO2: 100% (18 @ 05:55) (97% - 100%)  Wt(kg): --  Daily Height in cm: 167.64 (2018 20:24)    Daily Weight in k (2018 07:43)  I&O's Summary    2018 07:01  -  2018 07:00  --------------------------------------------------------  IN: 0 mL / OUT: 200 mL / NET: -200 mL        Labs:                        15.9   6.82  )-----------( 126      ( 2018 06:30 )             48.9     06-20    136  |  100  |  21  ----------------------------<  90  4.1   |  18<L>  |  1.08    Ca    8.6      2018 06:30  Phos  2.5     06-20  Mg     2.1     -20    TPro  8.4<H>  /  Alb  4.1  /  TBili  2.9<H>  /  DBili  x   /  AST  125<H>  /  ALT  33  /  AlkPhos  66  06-19    PT/INR - ( 2018 11:55 )   PT: 13.2 SEC;   INR: 1.14          PTT - ( 2018 11:55 )  PTT:32.2 SEC  CARDIAC MARKERS ( 2018 11:55 )  x     / x     / 685 u/L / 32.40 ng/mL / x          Phosphorus Level, Serum: 2.5 mg/dL ( @ 06:30)  Magnesium, Serum: 2.1 mg/dL ( @ 06:30)      ECG:    Echo:    < from: Transthoracic Echocardiogram (16 @ 08:00) >    Patient name: MAIK LE  YOB: 1945   Age: 70 (M)   MR#: [id number]  Study Date: 3/3/2016  Location: O/PSonographer: Jyoti Levy  Study quality: Technically Fair  Referring Physician: Candace Griggs MD  Blood Pressure: 150/80 mmHg  Height: 5ft 4in  Weight: 134 lb  BSA: 1.7 m2  ------------------------------------------------------------------------  PROCEDURE: Transthoracic echocardiogram with 2-D, M-Mode  and complete spectral and color flow Doppler.  INDICATION: Abnormal electrocardiogram (ECG) (EKG) (R94.31)  ------------------------------------------------------------------------  DIMENSIONS:  Dimensions:     Normal Values:  LA:     3.4 cm    2.0 - 4.0 cm  Ao:     3.3 cm    2.0 - 3.8 cm  SEPTUM: 0.8 cm    0.6 - 1.2 cm  PWT:    0.8 cm    0.6 - 1.1 cm  LVIDd:  4.2 cm    3.0 - 5.6 cm  LVIDs:  2.6 cm    1.8 - 4.0 cm  Derived Variables:  LVMI: 61 g/m2  RWT: 0.38  Fractional short: 38 %  Ejection Fraction (Teicholtz): 69 %  ------------------------------------------------------------------------  OBSERVATIONS:  Mitral Valve: Mitral annular calcification, otherwise  normal mitral valve. Mild mitral regurgitation.  Aortic Root: Normal aortic root.  Aortic Valve: Aortic valve leaflet morphology not well  visualized.  Left Atrium: Normal left atrium.  LA volume index = 19  cc/m2.  Left Ventricle: Endocardium not well visualized; grossly  normal left ventricular systolic function. Normal left  ventricular internal dimensions and wall thicknesses.  Right Heart: Normal right atrium. The right ventricle is  not well visualized; grossly normal right ventricular  systolic function. Normal tricuspid valve. Minimal  tricuspid regurgitation. Normal pulmonic valve. Minimal  pulmonic regurgitation.  Pericardium/PleuraNormal pericardium with no pericardial  effusion.  ------------------------------------------------------------------------  CONCLUSIONS:  1. Mitral annular calcification, otherwise normal mitral  valve. Mild mitral regurgitation.  2. Normal left ventricular internal dimensions and wall  thicknesses.  3. Endocardium not well visualized; grossly normal left  ventricular systolic function.  4. The right ventricle is not well visualized; grossly  normal right ventricular systolic function.  *** Compared with echocardiogram of 10/9/2008, no  significant changes noted.  ------------------------------------------------------------------------  Confirmed on  3/3/2016 - 10:37:16 by Isidro Schaefer M.D.  ------------------------------------------------------------------------    < end of copied text >      Cath:  < from: Cardiac Cath Lab - Adult (18 @ 13:52) >    [facility]  [address] [place], [place] [address] [phone]  Cath Lab Report -- Comprehensive Report  Patient: MAIK LE  Study date: 2018  Account number: [id number]  MR number: [id number]  : 1945  Gender: Male  Race: A  Case Physician(s):  ARANZA Diaz M.D.  Referring Physician:  INDICATIONS: Subsequent STEMI.  HISTORY: The patient has a history of coronary artery disease. The patient  has hypertension and medication-treated dyslipidemia. PRIOR CARDIOVASCULAR  PROCEDURES: Coronary bypass.  PROCEDURE:  --  Left heart catheterization with ventriculography.  --  Left coronary angiography.  --  Right coronary angiography.  --  Bypass graft angiography.  --  Sheath Exchange for Intervention.  --  Coronary Thrombectomy.  --  Failed PCI Attempt.  --  Intervention on SVG (proximal 1/3) from the aorta to RPDA: balloon  angioplasty.  --  Intervention on SVG (distal anastomosis) from the aorta to RPDA:  balloon angioplasty.  --  Intervention on SVG (distal 1/3) from the aorta to RPDA: balloon  angioplasty.  TECHNIQUE: The risks and alternatives of the procedures and conscious  sedation were explained to the patient and informed consent was obtained.  Cardiac catheterization performed urgently. Coronary intervention  performed electively.  Local anesthetic given. Right femoral artery access. Left heart  catheterization. Ventriculography was performed. Left coronary artery  angiography. The vessel was injected utilizing a catheter. Right coronary  artery angiography. The vessel was injected utilizing a catheter. Graft  angiography. The vessel was injected utilizing a catheter. RADIATION  EXPOSURE: 28.9 min. A balloon angioplasty was performed on the 100 %  lesion in the proximal third of the saphenous vein graft from the aorta to  the right posterior descending. Following intervention there was a 100 %  residual stenosis. unable to rsote fow despite multiple thrombectomies,  PTCA etc. Vessel setup was performed. A 6 Fr. JR 4.0 Launcher guiding  catheter was used to intubate the vessel. Vessel setup was performed. A  190cm BMW Universal II wire was used to cross the lesion. Vessel setup was  performed. A [employer] 3D David Blue 180cm wire was used to cross the lesion.  Balloon angioplasty was attempted, but could not be successfully  completed, using a 2.5 X 15 Euphora RX balloon, with 2 inflations and a  maximum inflation pressure of 12 gia. Mechanical thrombectomy was  attempted, but the lesion could not be crossed using a Pronto V3 catheter,  with max duration 2 sec and 1 attempt(s). Mechanical thrombectomy was  attempted, but the lesion could not be crossed using a Pronto V3 catheter,  with max duration 4 sec and 1 attempt(s). A balloon angioplasty was  performed on the 100 % lesion in the distal anastomosis of the saphenous  vein graft from the aorta to the right posterior descending. Following  intervention there was a 100 % residual stenosis. Vessel setup was  performed. A 6 Fr. JR 4.0 Launcher guiding catheter was used to intubate  the vessel. Vessel setup was performed. A Asahi David Blue 180cm wire was  used to cross the lesion. Balloon angioplasty was attempted, but could not  be successfully completed, using a 2.5 X 15 Euphora RX balloon,with 4  inflations and a maximum inflation pressure of 8 gia. Mechanical  thrombectomy was attempted, but the lesion could not be crossed using a  Pronto V3 catheter, with max duration 10 sec and 1 attempt(s). Mechanical  thrombectomy was attempted, but the lesion could not be crossed using a  Pronto V3 catheter, with max duration 10 sec and 1 attempt(s). A balloon  angioplasty was performed on the 100 % lesion in the distal third of the  saphenous vein graft from the aorta to the right posteriordescending.  Following intervention there was a 100 % residual stenosis. Vessel setup  was performed. A 6 Fr. JR 4.0 Launcher guiding catheter was used to  intubate the vessel. Vessel setup was performed. A Asahi David Blue 180cm  wire was used to cross the lesion. Balloon angioplasty was attempted, but  could not be successfully completed, using a 2.5 X 15 Euphora RX balloon,  with 1 inflations and a maximum inflation pressure of 10 gia. Mechanical  thrombectomy was attempted, but the lesion could not be crossed using a  Pronto V3 catheter, with max duration 22 sec and 2 attempt(s). Mechanical  thrombectomy was attempted, but the lesion could not be crossed using a  Pronto V3 catheter, with max duration 6 sec and 2 attempt(s). Sheath  Exchange for Intervention. Coronary Thrombectomy. Failed PCI Attempt.  CONTRAST GIVEN: 110 ml Optiray. 62 ml Optiray.  MEDICATIONS GIVEN: Midazolam, 1 mg, IV. Fentanyl, 25 mcg, IV. 2% Lidocaine,  10 ml, subcutaneously. Cardene, 200 mcg. Bivalirudin (Angiomax) 250 mg/NS  50 mL, 10.4 ml, IV. Bivalirudin (Angiomax) 250 mg/NS 50 mL, infusion rate  of 1.75 mg/kg/hr, IV. Cardene, 200 mcg. 0.9% Normal saline, 95 ml, IV.  VENTRICLES: Analysis of regional contractile function demonstrated moderate  diaphragmatic hypokinesis and moderate posterobasal hypokinesis. EF  estimated was 40 %.  CORONARY VESSELS: The coronary circulation is right dominant.  LM:   --  Distal left main: There was a 95 % stenosis.  LAD:   --  Proximal LAD: There was a 100 % stenosis. CORBY atttached to mid  LAD is widely patent  CX:   --  Proximal circumflex: There was a 100 % stenosis. SVG to OM1 is  patent but distal anastomotic site has a 90% stenosis  RCA:   --  Ostial RCA: There was a 90 % stenosis.  --  RPDA: There was a 100 % stenosis. SVG to pDA is oscclude proximally  with thrombus no antegrade flow  --  RPLS: There was a tubular 95 % stenosis. There was a small vascular  territory distal to the lesion.  GRAFTS:   --  Graft to the mid LAD: The graft was a CORBY. Graft angiography  showed no evidence of disease.  --  Graft to the 1st obtuse marginal: The graft was a saphenous vein graft  from the aorta. There was a 90 % stenosis at the distal anastomosis.  --  Graft to the RPDA: The graft was a saphenous vein graft fromthe aorta.  There was a 100 % stenosis at the proximal anastomosis. There was a large  filling defect consistent with thrombus and TONY grade 0 flow through the  graft (no flow). There was a 100 % stenosis in the proximal third of the  graft. There was a 100 % stenosis in the distal third of the graft. There  was a 100 % stenosis at the distal anastomosis.  COMPLICATIONS: No complications occurred during the cath lab visit.  DIAGNOSTIC IMPRESSIONS: Patient presents 24 hours after inferior wall ST  elevation MI. Cath reveals a total occlusion of SVG to PDA with thrombus  (culprit vessel) SVG to om patent with distal 90% anastomotic lesion,  severe native RCA disease leading to diffusely diseased PL branch  DIAGNOSTIC RECOMMENDATIONS: Attempt to recanalize acutely occluded SVG to  PDA  INTERVENTIONAL IMPRESSIONS: PTCA of SVG to PDA was unsuccessful wire  crossed easily across to PDA but unable to recanalized despite PTCA and  thrombectomy  INTERVENTIONAL RECOMMENDATIONS: ASA brilinta, proceed with PTCI of SVG to  OM1. post MI care  Prepared and signed by  Celso Casarez M.D.  Signed 2018 16:02:42  HEMODYNAMIC TABLES  Pressures:  Baseline  Pressures:  - HR: 72  Pressures:  - Rhythm:  Pressures:  -- Aortic Pressure (S/D/M): 128/76/102  Pressures:  -- Left Ventricle (s/edp): 122/17/--  Pressures:  Intervention  Pressures:  - HR: 77  Pressures:  - Rhythm:  Pressures:  -- Aortic Pressure (S/D/M): 128/81/102  Pressures:  Post Angio  Pressures:  - HR: 75  Pressures:  - Rhythm:  Pressures:  -- Aortic Pressure (S/D/M): 130/69/56  Pressures:  -- Left Ventricle (s/edp): 126/20/--  Outputs:  Baseline  Outputs:  -- CALCULATIONS[age]  Outputs:  -- CALCULATIONS: Body Surface Area: 1.75  Outputs:  -- CALCULATIONS: Height in cm: 163.00  Outputs:  -- CALCULATIONS: Sex: Male  Outputs:  -- CALCULATIONS: Weight in k.40  Outputs:  -- OUTPUTS: O2 consumption: 218.62  Outputs:  -- OUTPUTS: Vo2 Indexed: 125.00  Outputs:  Intervention  Outputs:  -- OUTPUTS: O2 consumption:218.62  Outputs:  -- OUTPUTS: Vo2 Indexed: 125.00  Outputs:  Post Angio  Outputs:  -- OUTPUTS: O2 consumption: 218.62  Outputs:  -- OUTPUTS: Vo2 Indexed: 125.00    < end of copied text >      Interpretation of Telemetry:      Physical Exam:  Appearance: [ ] Normal  [ ] abnormal [ ] NAD   Eyes: [ ] PERRL [ ] EOMI  HENT: [ ] Normal [ ] Abnormal oral muscosa [ ]NC/AT  Cardiovascular: [ ] S1 [ ] S2 [ ] RRR [ ] m/r/g [ ]edema [ ] JVP  Procedural Access Site: [ ]  hematoma [ ] tender to palpation [ ] 2+ pulse [ ] bruit [ ] Ecchymosis  Respiratory: [ ] Clear to auscultation bilaterally  Gastrointestinal: [ ] Soft [ ] tenderness[ ] distension [ ] BS  Musculoskeletal: [ ] clubbing [ ] joint deformity   Neurologic: [ ] Non-focal  Lymphatic: [ ] lymphadenopathy  Psychiatry: [ ] AAOx3  [ ] confused [ ] disoriented [ ] Mood & affect appropriate  Skin: [ ]  rashes [ ] ecchymoses [ ] cyanosis      Katie Hood, PGY1  Internal Medicine Resident  Pager: [phone] ACCEPT NOTE: CCU     HPI:  73 y/o M w/ PMH of CABG x3() and PPM c/o exertional chest pain  x 2 days. Today 3AM patient woke up from sleep acute nausea. STEMI alert activated upon receipt of EKG for concern inferior wall MI. Pt seen by cardiology fellow and 2/2 him being chest pain free at the moment and late presentation pt not emergently brought to cath lab. Pt will have angiogram with Dr. Casarez today. (2018 12:53)      SUBJECTIVE:  Patient is a 72y old  Male who presents with a chief complaint of chest pain. Patient seen and examined at bedside. Family present at bedside. Patient late presentation, not emergently brought to cath lab. Cath performed and revealed total occlusion of SVG to PDA with thrombus and severe disease of SVG to OM1 at anastomotic site. PTCI of total SVG occlusion unsuccessful despite easy wiring of vessel, PTCA and thrombectomy- still TONY 0 flow. Patient's LHC complicated by R groin hematoma, requiring IV pressor support. Now on Levophed, being down titrated. Patient reports he feels "Okay, better than before". Denies chest pain, SOB, n/v, abdominal pain. No pain at L/R groin site. No identified focal deficits. BP at bedside 99/64.           OBJECTIVE:  Review Of Systems:  Constitutional: [ ] Fever [ ] Chills [ ] Fatigue [ ] Weight change   HEENT: [ ] Blurred vision [ ] Eye Pain [ ] Headache [ ] Runny nose [ ] Sore Throat   Respiratory: [ ] Cough [ ] Wheezing [ ] Shortness of breath  Cardiovascular: [ ] Chest Pain [ ] Palpitations [ ] CHARLES [ ] PND [ ] Orthopnea  Gastrointestinal: [ ] Abdominal Pain [ ] Diarrhea [ ] Constipation [ ] Hemorrhoids [ ] Nausea [ ] Vomiting  Genitourinary: [ ] Nocturia [ ] Dysuria [ ] Incontinence  Extremities: [ ] Swelling [ ] Joint Pain  Neurologic: [ ] Focal deficit [ ] Paresthesias [ ] Syncope  Lymphatic: [ ] Swelling [ ] Lymphadenopathy   Skin: [ ] Rash [x ] Ecchymoses at R groin [ ] Wounds [ ] Lesions  Psychiatry: [ ] Depression [ ] Suicidal/Homicidal Ideation [ ] Anxiety [ ] Sleep Disturbances  [ x] 10 point review of systems is otherwise negative except as mentioned above            [ ]Unable to obtain    Allergy:  Allergies    penicillin (Other)    Intolerances    Medications:  MEDICATIONS  (STANDING):  amLODIPine   Tablet 5 milliGRAM(s) Oral daily  aspirin enteric coated 81 milliGRAM(s) Oral daily  atorvastatin 80 milliGRAM(s) Oral at bedtime  metoprolol succinate ER 50 milliGRAM(s) Oral daily  sodium chloride 0.9% lock flush 3 milliLiter(s) IV Push every 8 hours  sodium chloride 0.9%. 500 milliLiter(s) (75 mL/Hr) IV Continuous <Continuous>  tamsulosin 0.4 milliGRAM(s) Oral at bedtime  ticagrelor 90 milliGRAM(s) Oral two times a day    MEDICATIONS  (PRN):  nitroglycerin     SubLingual 0.4 milliGRAM(s) SubLingual every 5 minutes PRN Chest Pain      PMH/PSH/FH/SH: [ ] Unchanged    Vitals:  T(C): 37.2 (18 @ 05:55), Max: 37.2 (18 @ 05:55)  HR: 64 (18 @ 05:55) (64 - 85)  BP: 129/83 (18 @ 05:55) (109/79 - 136/86)  BP(mean): --  RR: 18 (18 @ 05:55) (18 - 18)  SpO2: 100% (18 @ 05:55) (97% - 100%)  Wt(kg): --  Daily Height in cm: 167.64 (2018 20:24)    Daily Weight in k (2018 07:43)  I&O's Summary    2018 07:01  -  2018 07:00  --------------------------------------------------------  IN: 0 mL / OUT: 200 mL / NET: -200 mL        Labs:                        15.9   6.82  )-----------( 126      ( 2018 06:30 )             48.9     0620    136  |  100  |  21  ----------------------------<  90  4.1   |  18<L>  |  1.08    Ca    8.6      2018 06:30  Phos  2.5       Mg     2.1         TPro  8.4<H>  /  Alb  4.1  /  TBili  2.9<H>  /  DBili  x   /  AST  125<H>  /  ALT  33  /  AlkPhos  66  19    PT/INR - ( 2018 11:55 )   PT: 13.2 SEC;   INR: 1.14          PTT - ( 2018 11:55 )  PTT:32.2 SEC  CARDIAC MARKERS ( 2018 11:55 )  x     / x     / 685 u/L / 32.40 ng/mL / x          Phosphorus Level, Serum: 2.5 mg/dL ( @ 06:30)  Magnesium, Serum: 2.1 mg/dL ( @ 06:30)    Troponin T, High Sensitivity (18 @ 11:55)    Troponin T, High Sensitivity Result: [id number]: RESULT CALLED TO: JESUS PLATA  DATE / TIME CALLED: 18 1258  CALLED BY: SPENCER JIMENEZ  _______________________________________________________  This result was obtained using the new  5th generation high  sensitivity troponin assay.  The units and reference limits  are different from the previous 4th generation.  For  interpretive guidance, please contact your clinical  leadership. For technical questions, contact the laboratory.  ________________________________________________________  ---------------------***PLEASE NOTE***----------------------  Rapid changes upward or downward in high-sensitivity  troponin levels strongly suggest acute myocardial injury.  Hemolysis may falsely lower results. Renal impairment may  increase results.    Normal: <6 - 14 ng/L  Indeterminate: 15 - 51 ng/L  Elevated: >51 ng/L    Please see "[email or web address] on the [employer]  intranet for more information. ng/L    Echo:    < from: Transthoracic Echocardiogram (16 @ 08:00) >    Patient name: MAIK LE  YOB: 1945   Age: 70 (M)   MR#: [id number]  Study Date: 3/3/2016  Location: O/PSonographer: Jyoti Levy  Study quality: Technically Fair  Referring Physician: Candace Griggs MD  Blood Pressure: 150/80 mmHg  Height: 5ft 4in  Weight: 134 lb  BSA: 1.7 m2  ------------------------------------------------------------------------  PROCEDURE: Transthoracic echocardiogram with 2-D, M-Mode  and complete spectral and color flow Doppler.  INDICATION: Abnormal electrocardiogram (ECG) (EKG) (R94.31)  ------------------------------------------------------------------------  DIMENSIONS:  Dimensions:     Normal Values:  LA:     3.4 cm    2.0 - 4.0 cm  Ao:     3.3 cm    2.0 - 3.8 cm  SEPTUM: 0.8 cm    0.6 - 1.2 cm  PWT:    0.8 cm    0.6 - 1.1 cm  LVIDd:  4.2 cm    3.0 - 5.6 cm  LVIDs:  2.6 cm    1.8 - 4.0 cm  Derived Variables:  LVMI: 61 g/m2  RWT: 0.38  Fractional short: 38 %  Ejection Fraction (Teicholtz): 69 %  ------------------------------------------------------------------------  OBSERVATIONS:  Mitral Valve: Mitral annular calcification, otherwise  normal mitral valve. Mild mitral regurgitation.  Aortic Root: Normal aortic root.  Aortic Valve: Aortic valve leaflet morphology not well  visualized.  Left Atrium: Normal left atrium.  LA volume index = 19  cc/m2.  Left Ventricle: Endocardium not well visualized; grossly  normal left ventricular systolic function. Normal left  ventricular internal dimensions and wall thicknesses.  Right Heart: Normal right atrium. The right ventricle is  not well visualized; grossly normal right ventricular  systolic function. Normal tricuspid valve. Minimal  tricuspid regurgitation. Normal pulmonic valve. Minimal  pulmonic regurgitation.  Pericardium/PleuraNormal pericardium with no pericardial  effusion.  ------------------------------------------------------------------------  CONCLUSIONS:  1. Mitral annular calcification, otherwise normal mitral  valve. Mild mitral regurgitation.  2. Normal left ventricular internal dimensions and wall  thicknesses.  3. Endocardium not well visualized; grossly normal left  ventricular systolic function.  4. The right ventricle is not well visualized; grossly  normal right ventricular systolic function.  *** Compared with echocardiogram of 10/9/2008, no  significant changes noted.  ------------------------------------------------------------------------  Confirmed on  3/3/2016 - 10:37:16 by Isidro Schaefer M.D.  ------------------------------------------------------------------------    < end of copied text >      Cath:  < from: Cardiac Cath Lab - Adult (18 @ 13:52) >    [address] [place], [place] [address] [phone]  Cath Lab Report -- Comprehensive Report  Patient: MAIK LE  Study date: 2018  Account number: [id number]  MR number: [id number]  : 1945  Gender: Male  Race: A  Case Physician(s):  ARANZA Diaz M.D.  Referring Physician:  INDICATIONS: Subsequent STEMI.  HISTORY: The patient has a history of coronary artery disease. The patient  has hypertension and medication-treated dyslipidemia. PRIOR CARDIOVASCULAR  PROCEDURES: Coronary bypass.  PROCEDURE:  --  Left heart catheterization with ventriculography.  --  Left coronary angiography.  --  Right coronary angiography.  --  Bypass graft angiography.  --  Sheath Exchange for Intervention.  --  Coronary Thrombectomy.  --  Failed PCI Attempt.  --  Intervention on SVG (proximal 1/3) from the aorta to RPDA: balloon  angioplasty.  --  Intervention on SVG (distal anastomosis) from the aorta to RPDA:  balloon angioplasty.  --  Intervention on SVG (distal 1/3) from the aorta to RPDA: balloon  angioplasty.  TECHNIQUE: The risks and alternatives of the procedures and conscious  sedation were explained to the patient and informed consent was obtained.  Cardiac catheterization performed urgently. Coronary intervention  performed electively.  Local anesthetic given. Right femoral artery access. Left heart  catheterization. Ventriculography was performed. Left coronary artery  angiography. The vessel was injected utilizing a catheter. Right coronary  artery angiography. The vessel was injected utilizing a catheter. Graft  angiography. The vessel was injected utilizing a catheter. RADIATION  EXPOSURE: 28.9 min. A balloon angioplasty was performed on the 100 %  lesion in the proximal third of the saphenous vein graft from the aorta to  the right posterior descending. Following intervention there was a 100 %  residual stenosis. unable to rsote fow despite multiple thrombectomies,  PTCA etc. Vessel setup was performed. A 6 Fr. JR 4.0 Launcher guiding  catheter was used to intubate the vessel. Vessel setup was performed. A  190cm BMW Universal II wire was used to cross the lesion. Vessel setup was  performed. A AssayMetricson Blue 180cm wire was used to cross the lesion.  Balloon angioplasty was attempted, but could not be successfully  completed, using a 2.5 X 15 Euphora RX balloon, with 2 inflations and a  maximum inflation pressure of 12 gia. Mechanical thrombectomy was  attempted, but the lesion could not be crossed using a Pronto V3 catheter,  with max duration 2 sec and 1 attempt(s). Mechanical thrombectomy was  attempted, but the lesion could not be crossed using a Pronto V3 catheter,  with max duration 4 sec and 1 attempt(s). A balloon angioplasty was  performed on the 100 % lesion in the distal anastomosis of the saphenous  vein graft from the aorta to the right posterior descending. Following  intervention there was a 100 % residual stenosis. Vessel setup was  performed. A 6 Fr. JR 4.0 Launcher guiding catheter was used to intubate  the vessel. Vessel setup was performed. A Asahi David Blue 180cm wire was  used to cross the lesion. Balloon angioplasty was attempted, but could not  be successfully completed, using a 2.5 X 15 Euphora RX balloon,with 4  inflations and a maximum inflation pressure of 8 gia. Mechanical  thrombectomy was attempted, but the lesion could not be crossed using a  Pronto V3 catheter, with max duration 10 sec and 1 attempt(s). Mechanical  thrombectomy was attempted, but the lesion could not be crossed using a  Pronto V3 catheter, with max duration 10 sec and 1 attempt(s). A balloon  angioplasty was performed on the 100 % lesion in the distal third of the  saphenous vein graft from the aorta to the right posteriordescending.  Following intervention there was a 100 % residual stenosis. Vessel setup  was performed. A 6 Fr. JR 4.0 Launcher guiding catheter was used to  intubate the vessel. Vessel setup was performed. A Asahi David Blue 180cm  wire was used to cross the lesion. Balloon angioplasty was attempted, but  could not be successfully completed, using a 2.5 X 15 Euphora RX balloon,  with 1 inflations and a maximum inflation pressure of 10 gia. Mechanical  thrombectomy was attempted, but the lesion could not be crossed using a  Pronto V3 catheter, with max duration 22 sec and 2 attempt(s). Mechanical  thrombectomy was attempted, but the lesion could not be crossed using a  Pronto V3 catheter, with max duration 6 sec and 2 attempt(s). Sheath  Exchange for Intervention. Coronary Thrombectomy. Failed PCI Attempt.  CONTRAST GIVEN: 110 ml Optiray. 62 ml Optiray.  MEDICATIONS GIVEN: Midazolam, 1 mg, IV. Fentanyl, 25 mcg, IV. 2% Lidocaine,  10 ml, subcutaneously. Cardene, 200 mcg. Bivalirudin (Angiomax) 250 mg/NS  50 mL, 10.4 ml, IV. Bivalirudin (Angiomax) 250 mg/NS 50 mL, infusion rate  of 1.75 mg/kg/hr, IV. Cardene, 200 mcg. 0.9% Normal saline, 95 ml, IV.  VENTRICLES: Analysis of regional contractile function demonstrated moderate  diaphragmatic hypokinesis and moderate posterobasal hypokinesis. EF  estimated was 40 %.  CORONARY VESSELS: The coronary circulation is right dominant.  LM:   --  Distal left main: There was a 95 % stenosis.  LAD:   --  Proximal LAD: There was a 100 % stenosis. CORBY atttached to mid  LAD is widely patent  CX:   --  Proximal circumflex: There was a 100 % stenosis. SVG to OM1 is  patent but distal anastomotic site has a 90% stenosis  RCA:   --  Ostial RCA: There was a 90 % stenosis.  --  RPDA: There was a 100 % stenosis. SVG to pDA is oscclude proximally  with thrombus no antegrade flow  --  RPLS: There was a tubular 95 % stenosis. There was a small vascular  territory distal to the lesion.  GRAFTS:   --  Graft to the mid LAD: The graft was a CORBY. Graft angiography  showed no evidence of disease.  --  Graft to the 1st obtuse marginal: The graft was a saphenous vein graft  from the aorta. There was a 90 % stenosis at the distal anastomosis.  --  Graft to the RPDA: The graft was a saphenous vein graft fromthe aorta.  There was a 100 % stenosis at the proximal anastomosis. There was a large  filling defect consistent with thrombus and TONY grade 0 flow through the  graft (no flow). There was a 100 % stenosis in the proximal third of the  graft. There was a 100 % stenosis in the distal third of the graft. There  was a 100 % stenosis at the distal anastomosis.  COMPLICATIONS: No complications occurred during the cath lab visit.  DIAGNOSTIC IMPRESSIONS: Patient presents 24 hours after inferior wall ST  elevation MI. Cath reveals a total occlusion of SVG to PDA with thrombus  (culprit vessel) SVG to om patent with distal 90% anastomotic lesion,  severe native RCA disease leading to diffusely diseased PL branch  DIAGNOSTIC RECOMMENDATIONS: Attempt to recanalize acutely occluded SVG to  PDA  INTERVENTIONAL IMPRESSIONS: PTCA of SVG to PDA was unsuccessful wire  crossed easily across to PDA but unable to recanalized despite PTCA and  thrombectomy  INTERVENTIONAL RECOMMENDATIONS: ASA brilinta, proceed with PTCI of SVG to  OM1. post MI care  Prepared and signed by  Celso Casarez M.D.  Signed 2018 16:02:42  HEMODYNAMIC TABLES  Pressures:  Baseline  Pressures:  - HR: 72  Pressures:  - Rhythm:  Pressures:  -- Aortic Pressure (S/D/M): 128/76/102  Pressures:  -- Left Ventricle (s/edp): 122/17/--  Pressures:  Intervention  Pressures:  - HR: 77  Pressures:  - Rhythm:  Pressures:  -- Aortic Pressure (S/D/M): 128/81/102  Pressures:  Post Angio  Pressures:  - HR: 75  Pressures:  - Rhythm:  Pressures:  -- Aortic Pressure (S/D/M): 130/69/56  Pressures:  -- Left Ventricle (s/edp): 126/20/--  Outputs:  Baseline  Outputs:  -- CALCULATIONS[age]  Outputs:  -- CALCULATIONS: Body Surface Area: 1.75  Outputs:  -- CALCULATIONS: Height in cm: 163.00  Outputs:  -- CALCULATIONS: Sex: Male  Outputs:  -- CALCULATIONS: Weight in k.40  Outputs:  -- OUTPUTS: O2 consumption: 218.62  Outputs:  -- OUTPUTS: Vo2 Indexed: 125.00  Outputs:  Intervention  Outputs:  -- OUTPUTS: O2 consumption:218.62  Outputs:  -- OUTPUTS: Vo2 Indexed: 125.00  Outputs:  Post Angio  Outputs:  -- OUTPUTS: O2 consumption: 218.62  Outputs:  -- OUTPUTS: Vo2 Indexed: 125.00    < end of copied text >          Physical Exam:  Appearance: [x ] Normal  [ ] abnormal [x ] NAD   Eyes: [ ] PERRL [ ] EOMI  HENT: [ ] Normal [ ] Abnormal oral muscosa [ ]NC/AT  Cardiovascular: [x ] S1 [ x] S2 [x ] RRR [x ] m/r/g [ ]edema [ ] JVP  Procedural Access Site: [x ]  hematoma [ ] tender to palpation [ x 2+ pulse [ ] bruit [x ] Ecchymosis  Respiratory: [x] Clear to auscultation bilaterally  Gastrointestinal: [x ] Soft [ ] tenderness[ ] distension [ x] BS  Musculoskeletal: [ ] clubbing [ ] joint deformity   Neurologic: [x ] Non-focal  Lymphatic: [ ] lymphadenopathy  Psychiatry: [x ] AAOx3  [ ] confused [ ] disoriented [ ] Mood & affect appropriate  Skin: [ ]  rashes [x ] ecchymoses in R groin [ ] cyanosis      Katie Hood, PGY1  Internal Medicine Resident  Pager: [phone]

## 2018-06-20 NOTE — PROGRESS NOTE ADULT - SUBJECTIVE AND OBJECTIVE BOX
Patient angiograpy today at 8AM for PTCI of SVG to OM1   right radial approach- poor guide spport  right femoral unable to pass wire hematoma developed later  left femoral SVG to IM1 anastomotic 90% stenosis wire crossed in attempt to pass balloon patient became hypotensive and large rigght groin expanding hematoma developed  procedure terminated give IV fluids low  dose levo and hematoma compressed    MEDICATIONS:  MEDICATIONS  (STANDING):  artificial  tears Solution 1 Drop(s) Both EYES three times a day  aspirin enteric coated 81 milliGRAM(s) Oral daily  atorvastatin 80 milliGRAM(s) Oral at bedtime  norepinephrine Infusion 0.05 MICROgram(s)/kG/Min (5.344 mL/Hr) IV Continuous <Continuous>  sodium chloride 0.9% lock flush 3 milliLiter(s) IV Push every 8 hours  sodium chloride 0.9%. 500 milliLiter(s) (75 mL/Hr) IV Continuous <Continuous>  tamsulosin 0.4 milliGRAM(s) Oral at bedtime  ticagrelor 90 milliGRAM(s) Oral two times a day      PHYSICAL EXAM:  T(C): 37.2 (06-20-18 @ 20:00), Max: 37.2 (06-20-18 @ 05:55)  HR: 67 (06-20-18 @ 21:00) (56 - 71)  BP: 121/58 (06-20-18 @ 21:00) (84/60 - 136/86)  RR: 27 (06-20-18 @ 21:00) (14 - 31)  SpO2: 97% (06-20-18 @ 21:00) (96% - 100%)  Wt(kg): --  I&O's Summary    19 Jun 2018 07:01  -  20 Jun 2018 07:00  --------------------------------------------------------  IN: 0 mL / OUT: 200 mL / NET: -200 mL    20 Jun 2018 07:01  -  20 Jun 2018 21:27  --------------------------------------------------------  IN: 692 mL / OUT: 300 mL / NET: 392 mL          Appearance: Normal	  HEENT:   Normal oral mucosa, PERRL, EOMI	  Cardiovascular: Normal S1 S2, No JVD, No murmurs ,  Respiratory: Lungs clear to auscultation, normal effort 	  right groin hematoma large compressed  LABS:    CARDIAC MARKERS:  CARDIAC MARKERS ( 20 Jun 2018 20:06 )  x     / x     / 414 u/L / 20.51 ng/mL / x      CARDIAC MARKERS ( 20 Jun 2018 13:10 )  x     / x     / 453 u/L / 25.37 ng/mL / x      CARDIAC MARKERS ( 19 Jun 2018 11:55 )  x     / x     / 685 u/L / 32.40 ng/mL / x                                    13.2   7.69  )-----------( 128      ( 20 Jun 2018 20:06 )             39.6     06-20    135  |  101  |  25<H>  ----------------------------<  92  4.1   |  20<L>  |  1.12    Ca    8.3<L>      20 Jun 2018 13:10  Phos  3.1     06-20  Mg     2.1     06-20    TPro  8.4<H>  /  Alb  4.1  /  TBili  2.9<H>  /  DBili  x   /  AST  125<H>  /  ALT  33  /  AlkPhos  66  06-19    proBNP:   Lipid Profile:   HgA1c:   TSH:           TELEMETRY: 	    ECG:  	  RADIOLOGY:   DIAGNOSTIC TESTING:  [ ] Echocardiogram:  [ ]  Catheterization:  [ ] Stress Test:    OTHER:

## 2018-06-20 NOTE — PROGRESS NOTE ADULT - ASSESSMENT
Large hematoma right groin  hypotension resolved with fluids and levo  protamine giiven  patient transferred to CCU for monitoring    will consider repeat attemt at PTCI from lef femoral either 6/21 or 6/22

## 2018-06-20 NOTE — CONSULT NOTE ADULT - ASSESSMENT
1. S/P acute IWMI later presentation  2. moderate segmental LV dysfunction  3. unsuccessful recanalization of SVG to PDA  4. disease of SVG to OM1    Recommend  proceed with PTCI of SVG to OM1 on 6/20/18  continue present medications  discharge planning if stable 6/21/18

## 2018-06-20 NOTE — CONSULT NOTE ADULT - SUBJECTIVE AND OBJECTIVE BOX
Patient seen at 1 PM on 6/19/18 with subsequent catheterization for ST elevation IWMI late presentation  HPI:  71 y/o M w/ PMH of CABG x3(1998) and PPM c/o exertional chest pain  x 2 days. Today 3AM patient woke up from sleep acute nausea. STEMI alert activated upon receipt of EKG for concern inferior wall MI. Pt seen by cardiology fellow and 2/2 him being chest pain free at the moment and late presentation pt not emergently brought to cath lab.   Cath performed and revaled total occusion of SVG to PDA with thrombus and severe disease of SVG to OM1 at anastomotic site.  PTCI of total SVG occlyusion unsuccessful despite easy wiring of vessel, PTCA and thrombectomy- still TONY 0 flow  patient was asymtomatic    MEDICATIONS:  MEDICATIONS  (STANDING):  amLODIPine   Tablet 5 milliGRAM(s) Oral daily  aspirin enteric coated 81 milliGRAM(s) Oral daily  atorvastatin 80 milliGRAM(s) Oral at bedtime  metoprolol succinate ER 50 milliGRAM(s) Oral daily  sodium chloride 0.9% lock flush 3 milliLiter(s) IV Push every 8 hours  sodium chloride 0.9%. 500 milliLiter(s) (75 mL/Hr) IV Continuous <Continuous>  tamsulosin 0.4 milliGRAM(s) Oral at bedtime  ticagrelor 90 milliGRAM(s) Oral two times a day      PHYSICAL EXAM:  T(C): 37.2 (06-20-18 @ 05:55), Max: 37.2 (06-20-18 @ 05:55)  HR: 64 (06-20-18 @ 05:55) (64 - 85)  BP: 129/83 (06-20-18 @ 05:55) (109/79 - 136/86)  RR: 18 (06-20-18 @ 05:55) (18 - 18)  SpO2: 100% (06-20-18 @ 05:55) (97% - 100%)  Wt(kg): --  I&O's Summary    19 Jun 2018 07:01  -  20 Jun 2018 07:00  --------------------------------------------------------  IN: 0 mL / OUT: 200 mL / NET: -200 mL      Height (cm): 167.64 (06-19 @ 20:24)  Weight (kg): 57 (06-19 @ 20:24)  BMI (kg/m2): 20.3 (06-19 @ 20:24)  BSA (m2): 1.64 (06-19 @ 20:24)    Appearance: Normal	NAD  HEENT:   Normal oral mucosa, PERRL, EOMI	  Cardiovascular: Normal S1 S2, No JVD, No murmurs ,  Respiratory: Lungs clear to auscultation, normal effort 	  Gastrointestinal:  Soft, Non-tender, + BS	  Skin: No rashes, No ecchymoses, No cyanosis, warm to touch  Musculoskeletal: Normal range of motion, normal strength  Psychiatry:  Mood & affect appropriate  Ext: No edema  Peripheral pulses palpable 2+ bilaterally      LABS:    CARDIAC MARKERS:  CARDIAC MARKERS ( 19 Jun 2018 11:55 )  x     / x     / 685 u/L / 32.40 ng/mL / x                                    15.9   6.82  )-----------( 126      ( 20 Jun 2018 06:30 )             48.9     06-19    135  |  99  |  27<H>  ----------------------------<  90  4.4   |  21<L>  |  1.43<H>    Ca    9.0      19 Jun 2018 14:22    TPro  8.4<H>  /  Alb  4.1  /  TBili  2.9<H>  /  DBili  x   /  AST  125<H>  /  ALT  33  /  AlkPhos  66  06-19          TELEMETRY: 	    ECG:  	NSR ST elevation inferiorly with Q waves      < from: Cardiac Cath Lab - Adult (06.19.18 @ 13:52) >  ENTRICLES: Analysis of regional contractile function demonstrated moderate  diaphragmatic hypokinesis and moderate posterobasal hypokinesis. EF  estimated was 40 %.  CORONARY VESSELS: The coronary circulation is right dominant.  LM:   --  Distal left main: There was a 95 % stenosis.  LAD:   --  Proximal LAD: There was a 100 % stenosis. CORBY atttached to mid  LAD is widely patent  CX:   --  Proximal circumflex: There was a 100 % stenosis. SVG to OM1 is  patent but distal anastomotic site has a 90% stenosis  RCA:   --  Ostial RCA: There was a 90 % stenosis.  --  RPDA: There was a 100 % stenosis. SVG to pDA is oscclude proximally  with thrombus no antegrade flow  --  RPLS: There was a tubular 95 % stenosis. There was a small vascular  territory distal to the lesion.  GRAFTS:   --  Graft to the mid LAD: The graft was a CORBY. Graft angiography  showed no evidence of disease.  --  Graft to the 1st obtuse marginal: The graft was a saphenous vein graft  from the aorta. There was a 90 % stenosis at the distal anastomosis.  --  Graft to the RPDA: The graft was a saphenous vein graft fromthe aorta.  There was a 100 % stenosis at the proximal anastomosis. There was a large  filling defect consistent with thrombus and TONY grade 0 flow through the  graft (no flow). There was a 100 % stenosis in the proximal third of the  graft. There was a 100 % stenosis in the distal third of the graft. There  was a 100 % stenosis at the distal anastomosis.  COMPLICATIONS: No complications occurred during the cath lab visit.  DIAGNOSTIC IMPRESSIONS: Patient presents 24 hours after inferior wall ST  elevation MI. Cath reveals a total occlusion of SVG to PDA with thrombus  (culprit vessel) SVG to om patent with distal 90% anastomotic lesion,  severe native RCA disease leading to diffusely diseased PL branch  DIAGNOSTIC RECOMMENDATIONS: Attempt to recanalize acutely occluded SVG to  PDA  INTERVENTIONAL IMPRESSIONS: PTCA of SVG to PDA was unsuccessful wire  crossed easily across to PDA but unable to recanalized despite PTCA and  thrombectomy  INTERVENTIONAL RECOMMENDATIONS: ASA brilinta, proceed with PTCI of SVG to  OM1. post MI care  Prepared and signed by  Celso Casarez M.D.  Signed 06/19/2018 16:02:42

## 2018-06-20 NOTE — CHART NOTE - NSCHARTNOTEFT_GEN_A_CORE
Patient had a left femoral sheat, 6 [place] of the [place] that was remoed. Maunal pressure applied for 20mins and good hemostatis obtained. No hematoma or bleeding noted .Vital sign stable .Patient tolerated procedure well.RN instructed to monitor groin for bleeding or hematoma.  Patient also had a right venous sheath removed. No bleeding or hematoma. Vital signs stable. Patient also had a right radial band removed. No bleeding and no hematoma. Left groin ecchymotic, no hematoma palpated at this time.

## 2018-06-20 NOTE — PROGRESS NOTE ADULT - SUBJECTIVE AND OBJECTIVE BOX
_________________________________________________________________________________________  ========>>  M E D I C A L   A T T E N D I N G    F O L L O W  U P  N O T E  <<=========  -----------------------------------------------------------------------------------------------------    - In summary, patient is a 72y year old man who originally presented with CP  - Patient today overall doing ok, no events noted overnight.    ==================>> REVIEW OF SYSTEM <<=================    GEN: no fever, no chills, no pain  RESP: no SOB, no cough, no sputum  CVS: no chest pain, no palpitations, no edema  GI: no abdominal pain, no nausea, no constipation, no diarrhea  : no dysuria, no frequency, no hematuria  Neuro: no headache, no dizziness  Derm : no itching, no rash    ==================>> PHYSICAL EXAM <<=================    GEN: A&O X 3 , NAD , comfortable  HEENT: NCAT, PERRL, MMM, hearing intact  Neck: supple , no JVD  CVS: S1S2 , regular , No M/R/G appreciated  PULM: CTA B/L,  no W/R/R appreciated  ABD.: soft. non tender, non distended,  bowel sounds present  Extrem: intact pulses , no edema   PSYCH : normal mood,  not anxious      ==================>> MEDICATIONS <<====================    MEDICATIONS  (STANDING):  amLODIPine   Tablet 5 milliGRAM(s) Oral daily  aspirin enteric coated 81 milliGRAM(s) Oral daily  atorvastatin 80 milliGRAM(s) Oral at bedtime  metoprolol succinate ER 50 milliGRAM(s) Oral daily  sodium chloride 0.9% lock flush 3 milliLiter(s) IV Push every 8 hours  sodium chloride 0.9%. 500 milliLiter(s) (75 mL/Hr) IV Continuous <Continuous>  tamsulosin 0.4 milliGRAM(s) Oral at bedtime  ticagrelor 90 milliGRAM(s) Oral two times a day    MEDICATIONS  (PRN):  nitroglycerin     SubLingual 0.4 milliGRAM(s) SubLingual every 5 minutes PRN Chest Pain    ==================>> VITAL SIGNS <<==================    T(C): 37.2 (06-20-18 @ 05:55), Max: 37.2 (06-20-18 @ 05:55)  HR: 64 (06-20-18 @ 05:55) (64 - 85)  BP: 129/83 (06-20-18 @ 05:55) (109/79 - 136/86)  RR: 18 (06-20-18 @ 05:55) (18 - 18)  SpO2: 100% (06-20-18 @ 05:55) (97% - 100%)    I&O's Summary    19 Jun 2018 07:01  -  20 Jun 2018 07:00  --------------------------------------------------------  IN: 0 mL / OUT: 200 mL / NET: -200 mL       ==================>> LAB AND IMAGING <<==================                        15.9   6.82  )-----------( 126      ( 20 Jun 2018 06:30 )             48.9        06-20    136  |  100  |  21  ----------------------------<  90  4.1   |  18<L>  |  1.08    Ca    8.6      20 Jun 2018 06:30  Phos  2.5     06-20  Mg     2.1     06-20    TPro  8.4<H>  /  Alb  4.1  /  TBili  2.9<H>  /  DBili  x   /  AST  125<H>  /  ALT  33  /  AlkPhos  66  06-19    PT/INR - ( 19 Jun 2018 11:55 )   PT: 13.2 SEC;   INR: 1.14          PTT - ( 19 Jun 2018 11:55 )  PTT:32.2 SEC            CARDIAC MARKERS ( 19 Jun 2018 11:55 )  x     / x     / 685 u/L / 32.40 ng/mL / x           Cath reviewed   ___________________________________________________________________________________  ===============>>  A S S E S S M E N T   A N D   P L A N <<===============  ------------------------------------------------------------------------------------------    **CAD, STEMI, h/o HTN, HLD, arrythmia post PPM,   ---cardio appreciated, for cath  and reattempt for PCI today  ---continue antiplatelets  ---continue antihypertensive  ---continue Statin  ---tele monitor  ---echo  ---monitor vitals closely    **BPH  ---flomax    -GI/DVT Prophylaxis.    --------------------------------------------  Case discussed with cardio  Education given on findings and plan of care  ___________________________  HRoberto Lemons D.O.  Pager: [phone]

## 2018-06-21 ENCOUNTER — TRANSCRIPTION ENCOUNTER (OUTPATIENT)
Age: 73
End: 2018-06-21

## 2018-06-21 LAB
BUN SERPL-MCNC: 24 MG/DL — HIGH (ref 7–23)
CALCIUM SERPL-MCNC: 8.1 MG/DL — LOW (ref 8.4–10.5)
CHLORIDE SERPL-SCNC: 104 MMOL/L — SIGNIFICANT CHANGE UP (ref 98–107)
CK MB BLD-MCNC: 15.53 NG/ML — HIGH (ref 1–6.6)
CK MB BLD-MCNC: 4.3 — HIGH (ref 0–2.5)
CK SERPL-CCNC: 363 U/L — HIGH (ref 30–200)
CO2 SERPL-SCNC: 20 MMOL/L — LOW (ref 22–31)
CREAT SERPL-MCNC: 1.04 MG/DL — SIGNIFICANT CHANGE UP (ref 0.5–1.3)
GLUCOSE SERPL-MCNC: 100 MG/DL — HIGH (ref 70–99)
HCT VFR BLD CALC: 39.6 % — SIGNIFICANT CHANGE UP (ref 39–50)
HGB BLD-MCNC: 13.3 G/DL — SIGNIFICANT CHANGE UP (ref 13–17)
MAGNESIUM SERPL-MCNC: 2.2 MG/DL — SIGNIFICANT CHANGE UP (ref 1.6–2.6)
MCHC RBC-ENTMCNC: 27.8 PG — SIGNIFICANT CHANGE UP (ref 27–34)
MCHC RBC-ENTMCNC: 33.6 % — SIGNIFICANT CHANGE UP (ref 32–36)
MCV RBC AUTO: 82.8 FL — SIGNIFICANT CHANGE UP (ref 80–100)
NRBC # FLD: 0 — SIGNIFICANT CHANGE UP
PHOSPHATE SERPL-MCNC: 2.2 MG/DL — LOW (ref 2.5–4.5)
PLATELET # BLD AUTO: 115 K/UL — LOW (ref 150–400)
PMV BLD: 11.1 FL — SIGNIFICANT CHANGE UP (ref 7–13)
POTASSIUM SERPL-MCNC: 4.1 MMOL/L — SIGNIFICANT CHANGE UP (ref 3.5–5.3)
POTASSIUM SERPL-SCNC: 4.1 MMOL/L — SIGNIFICANT CHANGE UP (ref 3.5–5.3)
RBC # BLD: 4.78 M/UL — SIGNIFICANT CHANGE UP (ref 4.2–5.8)
RBC # FLD: 13.6 % — SIGNIFICANT CHANGE UP (ref 10.3–14.5)
SODIUM SERPL-SCNC: 138 MMOL/L — SIGNIFICANT CHANGE UP (ref 135–145)
TROPONIN T, HIGH SENSITIVITY: 3231 NG/L — CRITICAL HIGH (ref ?–14)
WBC # BLD: 6.67 K/UL — SIGNIFICANT CHANGE UP (ref 3.8–10.5)
WBC # FLD AUTO: 6.67 K/UL — SIGNIFICANT CHANGE UP (ref 3.8–10.5)

## 2018-06-21 PROCEDURE — 93279 PRGRMG DEV EVAL PM/LDLS PM: CPT | Mod: 26

## 2018-06-21 RX ORDER — METOPROLOL TARTRATE 50 MG
12.5 TABLET ORAL
Qty: 0 | Refills: 0 | Status: DISCONTINUED | OUTPATIENT
Start: 2018-06-21 | End: 2018-06-21

## 2018-06-21 RX ORDER — LATANOPROST 0.05 MG/ML
1 SOLUTION/ DROPS OPHTHALMIC; TOPICAL AT BEDTIME
Qty: 0 | Refills: 0 | Status: DISCONTINUED | OUTPATIENT
Start: 2018-06-21 | End: 2018-06-23

## 2018-06-21 RX ORDER — CHLORHEXIDINE GLUCONATE 213 G/1000ML
1 SOLUTION TOPICAL
Qty: 0 | Refills: 0 | Status: DISCONTINUED | OUTPATIENT
Start: 2018-06-21 | End: 2018-06-23

## 2018-06-21 RX ORDER — SODIUM,POTASSIUM PHOSPHATES 278-250MG
1 POWDER IN PACKET (EA) ORAL
Qty: 0 | Refills: 0 | Status: DISCONTINUED | OUTPATIENT
Start: 2018-06-21 | End: 2018-06-21

## 2018-06-21 RX ORDER — METOPROLOL TARTRATE 50 MG
25 TABLET ORAL DAILY
Qty: 0 | Refills: 0 | Status: DISCONTINUED | OUTPATIENT
Start: 2018-06-21 | End: 2018-06-21

## 2018-06-21 RX ORDER — LATANOPROST 0.05 MG/ML
1 SOLUTION/ DROPS OPHTHALMIC; TOPICAL DAILY
Qty: 0 | Refills: 0 | Status: DISCONTINUED | OUTPATIENT
Start: 2018-06-21 | End: 2018-06-21

## 2018-06-21 RX ORDER — SODIUM,POTASSIUM PHOSPHATES 278-250MG
1 POWDER IN PACKET (EA) ORAL
Qty: 0 | Refills: 0 | Status: COMPLETED | OUTPATIENT
Start: 2018-06-21 | End: 2018-06-22

## 2018-06-21 RX ORDER — METOPROLOL TARTRATE 50 MG
12.5 TABLET ORAL
Qty: 0 | Refills: 0 | Status: DISCONTINUED | OUTPATIENT
Start: 2018-06-21 | End: 2018-06-23

## 2018-06-21 RX ADMIN — SODIUM CHLORIDE 3 MILLILITER(S): 9 INJECTION INTRAMUSCULAR; INTRAVENOUS; SUBCUTANEOUS at 05:12

## 2018-06-21 RX ADMIN — Medication 12.5 MILLIGRAM(S): at 17:30

## 2018-06-21 RX ADMIN — TICAGRELOR 90 MILLIGRAM(S): 90 TABLET ORAL at 17:30

## 2018-06-21 RX ADMIN — SODIUM CHLORIDE 3 MILLILITER(S): 9 INJECTION INTRAMUSCULAR; INTRAVENOUS; SUBCUTANEOUS at 12:15

## 2018-06-21 RX ADMIN — Medication 81 MILLIGRAM(S): at 12:09

## 2018-06-21 RX ADMIN — TICAGRELOR 90 MILLIGRAM(S): 90 TABLET ORAL at 05:45

## 2018-06-21 RX ADMIN — TAMSULOSIN HYDROCHLORIDE 0.4 MILLIGRAM(S): 0.4 CAPSULE ORAL at 21:34

## 2018-06-21 RX ADMIN — CHLORHEXIDINE GLUCONATE 1 APPLICATION(S): 213 SOLUTION TOPICAL at 10:45

## 2018-06-21 RX ADMIN — ATORVASTATIN CALCIUM 80 MILLIGRAM(S): 80 TABLET, FILM COATED ORAL at 21:33

## 2018-06-21 RX ADMIN — Medication 1 DROP(S): at 05:45

## 2018-06-21 RX ADMIN — Medication 1 DROP(S): at 14:26

## 2018-06-21 RX ADMIN — SODIUM CHLORIDE 3 MILLILITER(S): 9 INJECTION INTRAMUSCULAR; INTRAVENOUS; SUBCUTANEOUS at 21:36

## 2018-06-21 RX ADMIN — Medication 1 PACKET(S): at 17:30

## 2018-06-21 NOTE — PROGRESS NOTE ADULT - ASSESSMENT
1. status post right femoral hematoma resolved  2. Hemodynamically stable  3. No angina or CHF  4. Stable vital signs  5. Hemoglobin and hematocrit stable    Recommend  Continue present regimen of medications  Proceed with PTCI of the SVG to OM1 on 6/22/18  discussed with family and patient in detail. They are anxious to proceed tomorrow  discharge planning 24 hours later

## 2018-06-21 NOTE — CHART NOTE - NSCHARTNOTEFT_GEN_A_CORE
ELECTROPHYSIOLOGY  Device Interrogation Performed        [employer]                           Date/Time:6/27/18        :     [employer]                     Model:    [id number]                                 Mode:   VVI                           Rate:     50 bpm           Atrial Lead:  P wave amplitude:                          mv          Impedence:                   Ohms      Threshold:                V@             ms          Ventricular Lead(s):  RV Lead: R wave amplitude:          0               mv          Impedence:    640               Ohms      Threshold:      0.8          V@  0.9           ms   LV Lead:  R wave amplitude:                          mv          Impedence:                   Ohms      Threshold:                V@             ms         Battery Status:          x           Good                ELSIE                     EOL          Underlying Rhythm:   Complete heart block         Events/Observation: no events detected.          Impression/Plan: Currently in complete heart block.   Normal PPM function. Normal sensing and pacing via iterative testing. Good battery status. Excellent threshold capture.  No reprogramming.

## 2018-06-21 NOTE — PROGRESS NOTE ADULT - ASSESSMENT
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    #Neuro  AOx3, mentating well   Non focal exam, no active issues   Past CVA on ASA and stain     #CV  Patient with past CABG in 1998 and PPM([facility])  Being Vpaced, HR 60s  Patient presented with nausea and chest pain, late presentation EKG evidence of inferior wall STEMI, T troponin elevated to 2K  EKG on presentation sinus rhythm at 60 with LA enlargement, LAD. Patient has CHRISTEN of 1-2 mm in lead II, III, avF with waves. Additionally, flipped T wave with 1-2 mm depression in I and aVL. Flipped T waves in lateral segment  LHC evidence of evidence of total occlusion of SVG to PDA with thrombus and severe disease of SVG to OM1 at anastomotic site, PTCI of total SVG occlusion unsuccessful, complicated by R groin hematoma, requiring IV pressor support  MAP >65   Will wean Levophed as tolerates, mentating well   Hold home anti-HTN meds in the setting of critical patient   Continue ASA and Brilinta full dose   Continue high intensity statin   Trend CE  TTE in 2016 EF preserved at 68%, pending repeat TTE   Plan for possible repeat angiogram in the AM, NPO after midnight       #Resp  On RA, no active issues     #Heme/Onc  R groin hematoma noted on physical exam  Hg stable, will monitor CBC q8 hours, then likely can do q12 if stable  Hg goal >8 given severity of CAD   Active T+S  If continues to expand, Hg downtrending HD unstable, will get further imagining     #PPX  DVT: hold in the setting of active bleeding  Diet: Regular diet   No GI ppx needed 72M with PMHx of 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 Now off pressor support.    #Neuro  AOx3, mentating well   Non focal exam, no active issues   Past CVA on ASA and stain     #CV  Patient with past CABG in 1998 and PPM([facility])  Being Vpaced, HR 60s  Patient presented with nausea and chest pain, late presentation EKG evidence of inferior wall STEMI, T troponin elevated to 2K  EKG on presentation sinus rhythm at 60 with LA enlargement, LAD. Patient has CHRISTEN of 1-2 mm in lead II, III, avF with waves. Additionally, flipped T wave with 1-2 mm depression in I and aVL. Flipped T waves in lateral segment  LHC evidence of evidence of total occlusion of SVG to PDA with thrombus and severe disease of SVG to OM1 at anastomotic site, PTCI of total SVG occlusion unsuccessful, complicated by R groin hematoma, requiring IV pressor support  MAP >65   Will wean Levophed as tolerates, mentating well   Hold home anti-HTN meds in the setting of critical patient   Continue ASA and Brilinta full dose   Continue high intensity statin   Trend CE  TTE in 2016 EF preserved at 68%, pending repeat TTE   Plan for possible repeat angiogram in the AM, NPO after midnight       #Resp  On RA, no active issues     #Heme/Onc  R groin hematoma noted on physical exam  Hg stable, will monitor CBC q8 hours, then likely can do q12 if stable  Hg goal >8 given severity of CAD   Active T+S  If continues to expand, Hg downtrending HD unstable, will get further imagining     #PPX  DVT: hold in the setting of active bleeding  Diet: Regular diet   No GI ppx needed

## 2018-06-21 NOTE — PROGRESS NOTE ADULT - SUBJECTIVE AND OBJECTIVE BOX
CONTACT INFO:  Nyla Groves PGY-1  [id number]      HPI / INTERVAL HISTORY:  Patient seen and examined at bedside. On tele o/n : V pacing with spiking on T waves (possible artifact)      OBJECTIVE:  VITAL SIGNS:  ICU Vital Signs Last 24 Hrs  T(C): 37.2 (21 Jun 2018 00:00), Max: 37.2 (20 Jun 2018 20:00)  T(F): 98.9 (21 Jun 2018 00:00), Max: 98.9 (20 Jun 2018 20:00)  HR: 69 (21 Jun 2018 07:00) (56 - 83)  BP: 92/55 (21 Jun 2018 07:00) (84/60 - 126/99)  BP(mean): 63 (21 Jun 2018 07:00) (63 - 106)  ABP: 127/58 (20 Jun 2018 14:00) (115/53 - 127/58)  ABP(mean): 78 (20 Jun 2018 14:00) (70 - 121)  RR: 15 (21 Jun 2018 07:00) (14 - 31)  SpO2: 99% (21 Jun 2018 07:00) (96% - 99%)        06-20 @ 07:01  -  06-21 @ 07:00  --------------------------------------------------------  IN: 692 mL / OUT: 700 mL / NET: -8 mL      CAPILLARY BLOOD GLUCOSE          PHYSICAL EXAM:  Constitutional: awake, sleepy, lying in bed, breathing comfortably on NC ~3L/min  Eyes: no conjunctival erythema, able to tolerate opening eyes  Neck: no jvd observed  Respiratory: adequate air movement b/l, no wheeze, no crackles  Cardiovascular: rrr s1 s2  Gastrointestinal: soft, non-distended, non-tender  Genitourinary: indwelling urethral catheter in place  Extremities: trace pretibial edema  Vascular: PT pulses Dopplerable b/l. DP pulses not Dopplerable by PGY-1.   Neurological: moves all extremities  Psychiatric: cannot recall name of hospital    LABS:                        13.3   6.67  )-----------( 115      ( 21 Jun 2018 05:50 )             39.6     06-21    138  |  104  |  24<H>  ----------------------------<  100<H>  4.1   |  20<L>  |  1.04    Ca    8.1<L>      21 Jun 2018 05:50  Phos  2.2     06-21  Mg     2.2     06-21    TPro  8.4<H>  /  Alb  4.1  /  TBili  2.9<H>  /  DBili  x   /  AST  125<H>  /  ALT  33  /  AlkPhos  66  06-19    LIVER FUNCTIONS - ( 19 Jun 2018 11:55 )  Alb: 4.1 g/dL / Pro: 8.4 g/dL / ALK PHOS: 66 u/L / ALT: 33 u/L / AST: 125 u/L / GGT: x           PT/INR - ( 19 Jun 2018 11:55 )   PT: 13.2 SEC;   INR: 1.14          PTT - ( 19 Jun 2018 11:55 )  PTT:32.2 SEC    CARDIAC MARKERS ( 21 Jun 2018 05:50 )  x     / x     / 363 u/L / 15.53 ng/mL / x      CARDIAC MARKERS ( 20 Jun 2018 20:06 )  x     / x     / 414 u/L / 20.51 ng/mL / x      CARDIAC MARKERS ( 20 Jun 2018 13:10 )  x     / x     / 453 u/L / 25.37 ng/mL / x      CARDIAC MARKERS ( 19 Jun 2018 11:55 )  x     / x     / 685 u/L / 32.40 ng/mL / x              RADIOLOGY & ADDITIONAL TESTS:       MEDICATIONS:  artificial  tears Solution 1 Drop(s) Both EYES three times a day  aspirin enteric coated 81 milliGRAM(s) Oral daily  atorvastatin 80 milliGRAM(s) Oral at bedtime  chlorhexidine 4% Liquid 1 Application(s) Topical <User Schedule>  sodium chloride 0.9% lock flush 3 milliLiter(s) IV Push every 8 hours  sodium chloride 0.9%. 500 milliLiter(s) IV Continuous <Continuous>  tamsulosin 0.4 milliGRAM(s) Oral at bedtime  ticagrelor 90 milliGRAM(s) Oral two times a day      ALLERGIES:  penicillin (Other) CONTACT INFO:  Nyla Groves PGY-1  [id number]      HPI / INTERVAL HISTORY:  Patient seen and examined at bedside. On tele o/n : V pacing with spiking on T waves (possible artifact)    Patient feels well. He states the right groin hematoma is improved. There is no pain in the area unless palpated.       OBJECTIVE:  VITAL SIGNS:  ICU Vital Signs Last 24 Hrs  T(C): 37.2 (21 Jun 2018 00:00), Max: 37.2 (20 Jun 2018 20:00)  T(F): 98.9 (21 Jun 2018 00:00), Max: 98.9 (20 Jun 2018 20:00)  HR: 69 (21 Jun 2018 07:00) (56 - 83)  BP: 92/55 (21 Jun 2018 07:00) (84/60 - 126/99)  BP(mean): 63 (21 Jun 2018 07:00) (63 - 106)  ABP: 127/58 (20 Jun 2018 14:00) (115/53 - 127/58)  ABP(mean): 78 (20 Jun 2018 14:00) (70 - 121)  RR: 15 (21 Jun 2018 07:00) (14 - 31)  SpO2: 99% (21 Jun 2018 07:00) (96% - 99%)        06-20 @ 07:01  -  06-21 @ 07:00  --------------------------------------------------------  IN: 692 mL / OUT: 700 mL / NET: -8 mL      CAPILLARY BLOOD GLUCOSE          PHYSICAL EXAM:  Constitutional: awake, sleepy, lying in bed, breathing comfortably on NC ~3L/min  Eyes: no conjunctival erythema, able to tolerate opening eyes  Neck: no jvd observed  Respiratory: adequate air movement b/l, no wheeze, no crackles  Cardiovascular: rrr s1 s2  Gastrointestinal: soft, non-distended, non-tender  Genitourinary: indwelling urethral catheter in place  Extremities: trace pretibial edema  Neurological: moves all extremities  Psychiatric: cannot recall name of hospital    LABS:                        13.3   6.67  )-----------( 115      ( 21 Jun 2018 05:50 )             39.6     06-21    138  |  104  |  24<H>  ----------------------------<  100<H>  4.1   |  20<L>  |  1.04    Ca    8.1<L>      21 Jun 2018 05:50  Phos  2.2     06-21  Mg     2.2     06-21    TPro  8.4<H>  /  Alb  4.1  /  TBili  2.9<H>  /  DBili  x   /  AST  125<H>  /  ALT  33  /  AlkPhos  66  06-19    LIVER FUNCTIONS - ( 19 Jun 2018 11:55 )  Alb: 4.1 g/dL / Pro: 8.4 g/dL / ALK PHOS: 66 u/L / ALT: 33 u/L / AST: 125 u/L / GGT: x           PT/INR - ( 19 Jun 2018 11:55 )   PT: 13.2 SEC;   INR: 1.14          PTT - ( 19 Jun 2018 11:55 )  PTT:32.2 SEC    CARDIAC MARKERS ( 21 Jun 2018 05:50 )  x     / x     / 363 u/L / 15.53 ng/mL / x      CARDIAC MARKERS ( 20 Jun 2018 20:06 )  x     / x     / 414 u/L / 20.51 ng/mL / x      CARDIAC MARKERS ( 20 Jun 2018 13:10 )  x     / x     / 453 u/L / 25.37 ng/mL / x      CARDIAC MARKERS ( 19 Jun 2018 11:55 )  x     / x     / 685 u/L / 32.40 ng/mL / x              RADIOLOGY & ADDITIONAL TESTS:       MEDICATIONS:  artificial  tears Solution 1 Drop(s) Both EYES three times a day  aspirin enteric coated 81 milliGRAM(s) Oral daily  atorvastatin 80 milliGRAM(s) Oral at bedtime  chlorhexidine 4% Liquid 1 Application(s) Topical <User Schedule>  sodium chloride 0.9% lock flush 3 milliLiter(s) IV Push every 8 hours  sodium chloride 0.9%. 500 milliLiter(s) IV Continuous <Continuous>  tamsulosin 0.4 milliGRAM(s) Oral at bedtime  ticagrelor 90 milliGRAM(s) Oral two times a day      ALLERGIES:  penicillin (Other) CONTACT INFO:  Nyla Grovse PGY-1  [id number]      HPI / INTERVAL HISTORY:  Patient seen and examined at bedside. On tele o/n : V pacing with spiking on T waves (possible artifact)    Patient feels well. He states the right groin hematoma is improved. There is no pain in the area unless palpated.       OBJECTIVE:  VITAL SIGNS:  ICU Vital Signs Last 24 Hrs  T(C): 37.2 (21 Jun 2018 00:00), Max: 37.2 (20 Jun 2018 20:00)  T(F): 98.9 (21 Jun 2018 00:00), Max: 98.9 (20 Jun 2018 20:00)  HR: 69 (21 Jun 2018 07:00) (56 - 83)  BP: 92/55 (21 Jun 2018 07:00) (84/60 - 126/99)  BP(mean): 63 (21 Jun 2018 07:00) (63 - 106)  ABP: 127/58 (20 Jun 2018 14:00) (115/53 - 127/58)  ABP(mean): 78 (20 Jun 2018 14:00) (70 - 121)  RR: 15 (21 Jun 2018 07:00) (14 - 31)  SpO2: 99% (21 Jun 2018 07:00) (96% - 99%)        06-20 @ 07:01  -  06-21 @ 07:00  --------------------------------------------------------  IN: 692 mL / OUT: 700 mL / NET: -8 mL      CAPILLARY BLOOD GLUCOSE          PHYSICAL EXAM:  Appearance: [x ] Normal  [ ] abnormal [x ] NAD   Eyes: [ ] PERRL [ ] EOMI  HENT: [ ] Normal [ ] Abnormal oral [place] [ ]NC/AT  Cardiovascular: [x ] S1 [ x] S2 [x ] RRR [x ] m/r/g [ ]edema [ ] JVP  Procedural Access Site: [x ]  hematoma [ ] tender to palpation [ x 2+ pulse [ ] bruit [x ] Ecchymosis  Respiratory: [x] Clear to auscultation bilaterally  Gastrointestinal: [x ] Soft [ ] tenderness[ ] distension [ x] BS  Musculoskeletal: [ ] clubbing [ ] joint deformity   Neurologic: [x ] Non-focal  Lymphatic: [ ] lymphadenopathy  Psychiatry: [x ] AAOx3  [ ] confused [ ] disoriented [ ] Mood & affect appropriate  Skin: [ ]  rashes [x ] ecchymoses in R groin [ ] cyanosis    LABS:                        13.3   6.67  )-----------( 115      ( 21 Jun 2018 05:50 )             39.6     06-21    138  |  104  |  24<H>  ----------------------------<  100<H>  4.1   |  20<L>  |  1.04    Ca    8.1<L>      21 Jun 2018 05:50  Phos  2.2     06-21  Mg     2.2     06-21    TPro  8.4<H>  /  Alb  4.1  /  TBili  2.9<H>  /  DBili  x   /  AST  125<H>  /  ALT  33  /  AlkPhos  66  06-19    LIVER FUNCTIONS - ( 19 Jun 2018 11:55 )  Alb: 4.1 g/dL / Pro: 8.4 g/dL / ALK PHOS: 66 u/L / ALT: 33 u/L / AST: 125 u/L / GGT: x           PT/INR - ( 19 Jun 2018 11:55 )   PT: 13.2 SEC;   INR: 1.14          PTT - ( 19 Jun 2018 11:55 )  PTT:32.2 SEC    CARDIAC MARKERS ( 21 Jun 2018 05:50 )  x     / x     / 363 u/L / 15.53 ng/mL / x      CARDIAC MARKERS ( 20 Jun 2018 20:06 )  x     / x     / 414 u/L / 20.51 ng/mL / x      CARDIAC MARKERS ( 20 Jun 2018 13:10 )  x     / x     / 453 u/L / 25.37 ng/mL / x      CARDIAC MARKERS ( 19 Jun 2018 11:55 )  x     / x     / 685 u/L / 32.40 ng/mL / x              RADIOLOGY & ADDITIONAL TESTS:       MEDICATIONS:  artificial  tears Solution 1 Drop(s) Both EYES three times a day  aspirin enteric coated 81 milliGRAM(s) Oral daily  atorvastatin 80 milliGRAM(s) Oral at bedtime  chlorhexidine 4% Liquid 1 Application(s) Topical <User Schedule>  sodium chloride 0.9% lock flush 3 milliLiter(s) IV Push every 8 hours  sodium chloride 0.9%. 500 milliLiter(s) IV Continuous <Continuous>  tamsulosin 0.4 milliGRAM(s) Oral at bedtime  ticagrelor 90 milliGRAM(s) Oral two times a day      ALLERGIES:  penicillin (Other)

## 2018-06-21 NOTE — DISCHARGE NOTE ADULT - CARE PROVIDER_API CALL
Selma Lemons (DO), Internal Medicine  [address] [place] [id number]  Phone: [phone]  Fax: [phone] Selma Lemons (), Internal Medicine  [address] [facility] [address]  [place] [address]  Phone: [phone]  Fax: [phone]    Celso Casarez), Cardiovascular Disease; Internal Medicine; Interventional Cardiology  [address] [facility]  [address]  [place] [address]  Phone: [phone]  Fax: [phone]

## 2018-06-21 NOTE — DISCHARGE NOTE ADULT - HOME CARE AGENCY
[facility]  [phone] .   Nurse to call and visit day after discharge  [facility]  [phone] .   Nurse to call and visit day after discharge 6/24/2018

## 2018-06-21 NOTE — DISCHARGE NOTE ADULT - MEDICATION SUMMARY - MEDICATIONS TO STOP TAKING
I will STOP taking the medications listed below when I get home from the hospital:    simvastatin 20 mg oral tablet  -- 1 tab(s) by mouth once a day (at bedtime)    amLODIPine 5 mg oral tablet  -- 1 tab(s) by mouth once a day

## 2018-06-21 NOTE — DISCHARGE NOTE ADULT - PATIENT PORTAL LINK FT
You can access the [facility] Patient Portal, offered by [facility], by registering with the following website: [email or web address]

## 2018-06-21 NOTE — PROGRESS NOTE ADULT - SUBJECTIVE AND OBJECTIVE BOX
Subjective: Patient seen and examined. No new events except as noted.   patient feels well and denies chest pain chest pressure shortness of breath.  Hemoglobin and hematocrit have remained stable  Echocardiogram overall normal LV function despite recent ST elevation inferior wall MI  PTCI of theSVG to circumflex aborted yesterday because of expanding right femoral hematoma which has now resolved  MEDICATIONS:  MEDICATIONS  (STANDING):  artificial  tears Solution 1 Drop(s) Both EYES three times a day  aspirin enteric coated 81 milliGRAM(s) Oral daily  atorvastatin 80 milliGRAM(s) Oral at bedtime  chlorhexidine 4% Liquid 1 Application(s) Topical <User Schedule>  latanoprost 0.005% Ophthalmic Solution 1 Drop(s) Both EYES at bedtime  metoprolol tartrate 12.5 milliGRAM(s) Oral two times a day  potassium phosphate / sodium phosphate powder 1 Packet(s) Oral two times a day  sodium chloride 0.9% lock flush 3 milliLiter(s) IV Push every 8 hours  sodium chloride 0.9%. 500 milliLiter(s) (75 mL/Hr) IV Continuous <Continuous>  tamsulosin 0.4 milliGRAM(s) Oral at bedtime  ticagrelor 90 milliGRAM(s) Oral two times a day      PHYSICAL EXAM:  T(C): 36.6 (06-21-18 @ 12:00), Max: 37.2 (06-20-18 @ 20:00)  HR: 69 (06-21-18 @ 13:00) (57 - 85)  BP: 109/73 (06-21-18 @ 13:00) (84/60 - 126/99)  RR: 17 (06-21-18 @ 13:00) (14 - 31)  SpO2: 99% (06-21-18 @ 13:00) (96% - 100%)  Wt(kg): --  I&O's Summary    20 Jun 2018 07:01  -  21 Jun 2018 07:00  --------------------------------------------------------  IN: 692 mL / OUT: 700 mL / NET: -8 mL          Appearance: Normal	iin good spirits looks well office no complaints  HEENT:   Normal oral mucosa, PERRL, EOMI	  Cardiovascular: Normal S1 S2, No JVD, No murmurs ,  Respiratory: Lungs clear to auscultation, normal effort 	  Gastrointestinal:  Soft, Non-tender, + BS	  Skin: No rashes, No ecchymoses, No cyanosis, warm to touch  Musculoskeletal: Normal range of motion, normal strength  Psychiatry:  Mood & affect appropriate  right groin ecchymotic no swelling  Peripheral pulses palpable 2+ bilaterally      LABS:    CARDIAC MARKERS:  CARDIAC MARKERS ( 21 Jun 2018 05:50 )  x     / x     / 363 u/L / 15.53 ng/mL / x      CARDIAC MARKERS ( 20 Jun 2018 20:06 )  x     / x     / 414 u/L / 20.51 ng/mL / x      CARDIAC MARKERS ( 20 Jun 2018 13:10 )  x     / x     / 453 u/L / 25.37 ng/mL / x      CARDIAC MARKERS ( 19 Jun 2018 11:55 )  x     / x     / 685 u/L / 32.40 ng/mL / x                                    13.3   6.67  )-----------( 115      ( 21 Jun 2018 05:50 )             39.6     06-21    138  |  104  |  24<H>  ----------------------------<  100<H>  4.1   |  20<L>  |  1.04    Ca    8.1<L>      21 Jun 2018 05:50  Phos  2.2     06-21  Mg     2.2     06-21      proBNP:   Lipid Profile:   HgA1c:   TSH:     < from: Transthoracic Echocardiogram (06.20.18 @ 13:19) >  CONCLUSIONS:  1. Mitral annular calcification, otherwise normal mitral  valve. Mild mitral regurgitation.  2. Normal left ventricular internal dimensions and wall  thicknesses.  3. Normal left ventricular systolic function. No segmental  wall motion abnormalities.  4. Normal right ventricular size and function.   A device  wire is noted in the right heart.  *** Compared with echocardiogram of 3/3/2016, no  significant changes noted.    < end of copied text >

## 2018-06-21 NOTE — DISCHARGE NOTE ADULT - MEDICATION SUMMARY - MEDICATIONS TO TAKE
I will START or STAY ON the medications listed below when I get home from the hospital:    Rolling walker  -- Rolling Walker  Dispense # 1  -- Indication: For walker    aspirin 81 mg oral delayed release tablet  -- 1 tab(s) by mouth once a day  -- Indication: For Heart health    Flomax 0.4 mg oral capsule  -- 1 cap(s) by mouth once a day in the am  -- Indication: For urinary flow    atorvastatin 80 mg oral tablet  -- 1 tab(s) by mouth once a day (at bedtime)  -- Indication: For HLD    ticagrelor 90 mg oral tablet  -- 1 tab(s) by mouth 2 times a day  -- Indication: For CAD    Metoprolol Succinate ER 50 mg oral tablet, extended release  -- 1 tab(s) by mouth once a day in the am  -- Indication: For HTN    ocular lubricant ophthalmic solution  -- 1 drop(s) to each affected eye 3 times a day  -- Indication: For eye gtt    latanoprost 0.005% ophthalmic solution  -- 1 drop(s) in each eye once a day (at bedtime)   -- Indication: For eye gtt

## 2018-06-21 NOTE — DISCHARGE NOTE ADULT - CARE PLAN
Principal Discharge DX:	STEMI (ST elevation myocardial infarction)  Secondary Diagnosis:	Hypertension  Secondary Diagnosis:	Hyperlipidemia  Secondary Diagnosis:	Coronary artery disease Principal Discharge DX:	STEMI (ST elevation myocardial infarction)  Goal:	Followup with PMD and take all medications prescribed.  Assessment and plan of treatment:	Followup with PMD and take all medications prescribed. Low salt, low fat, low cholesterol diet  Secondary Diagnosis:	Hypertension  Goal:	Followup with PMD and take all medications prescribed.  Assessment and plan of treatment:	Followup with PMD and take all medications prescribed. Low salt, low fat, low cholesterol diet  Secondary Diagnosis:	Hyperlipidemia  Goal:	Followup with PMD and take all medications prescribed.  Assessment and plan of treatment:	Followup with PMD and take all medications prescribed. Low salt, low fat, low cholesterol diet  Secondary Diagnosis:	Coronary artery disease  Goal:	Followup with PMD and take all medications prescribed.  Assessment and plan of treatment:	Followup with PMD and take all medications prescribed. Low salt, low fat, low cholesterol diet

## 2018-06-22 LAB
BUN SERPL-MCNC: 21 MG/DL — SIGNIFICANT CHANGE UP (ref 7–23)
CALCIUM SERPL-MCNC: 8.2 MG/DL — LOW (ref 8.4–10.5)
CHLORIDE SERPL-SCNC: 104 MMOL/L — SIGNIFICANT CHANGE UP (ref 98–107)
CO2 SERPL-SCNC: 20 MMOL/L — LOW (ref 22–31)
CREAT SERPL-MCNC: 1.01 MG/DL — SIGNIFICANT CHANGE UP (ref 0.5–1.3)
GLUCOSE SERPL-MCNC: 96 MG/DL — SIGNIFICANT CHANGE UP (ref 70–99)
HCT VFR BLD CALC: 36.5 % — LOW (ref 39–50)
HGB BLD-MCNC: 12 G/DL — LOW (ref 13–17)
MAGNESIUM SERPL-MCNC: 2.1 MG/DL — SIGNIFICANT CHANGE UP (ref 1.6–2.6)
MCHC RBC-ENTMCNC: 28.5 PG — SIGNIFICANT CHANGE UP (ref 27–34)
MCHC RBC-ENTMCNC: 32.9 % — SIGNIFICANT CHANGE UP (ref 32–36)
MCV RBC AUTO: 86.7 FL — SIGNIFICANT CHANGE UP (ref 80–100)
NRBC # FLD: 0 — SIGNIFICANT CHANGE UP
PLATELET # BLD AUTO: 113 K/UL — LOW (ref 150–400)
PMV BLD: 11.4 FL — SIGNIFICANT CHANGE UP (ref 7–13)
POTASSIUM SERPL-MCNC: 3.8 MMOL/L — SIGNIFICANT CHANGE UP (ref 3.5–5.3)
POTASSIUM SERPL-SCNC: 3.8 MMOL/L — SIGNIFICANT CHANGE UP (ref 3.5–5.3)
RBC # BLD: 4.21 M/UL — SIGNIFICANT CHANGE UP (ref 4.2–5.8)
RBC # FLD: 13.2 % — SIGNIFICANT CHANGE UP (ref 10.3–14.5)
SODIUM SERPL-SCNC: 137 MMOL/L — SIGNIFICANT CHANGE UP (ref 135–145)
WBC # BLD: 5.93 K/UL — SIGNIFICANT CHANGE UP (ref 3.8–10.5)
WBC # FLD AUTO: 5.93 K/UL — SIGNIFICANT CHANGE UP (ref 3.8–10.5)

## 2018-06-22 PROCEDURE — 93010 ELECTROCARDIOGRAM REPORT: CPT

## 2018-06-22 RX ORDER — SODIUM CHLORIDE 9 MG/ML
500 INJECTION INTRAMUSCULAR; INTRAVENOUS; SUBCUTANEOUS
Qty: 0 | Refills: 0 | Status: DISCONTINUED | OUTPATIENT
Start: 2018-06-22 | End: 2018-06-23

## 2018-06-22 RX ADMIN — SODIUM CHLORIDE 3 MILLILITER(S): 9 INJECTION INTRAMUSCULAR; INTRAVENOUS; SUBCUTANEOUS at 07:31

## 2018-06-22 RX ADMIN — ATORVASTATIN CALCIUM 80 MILLIGRAM(S): 80 TABLET, FILM COATED ORAL at 21:35

## 2018-06-22 RX ADMIN — SODIUM CHLORIDE 3 MILLILITER(S): 9 INJECTION INTRAMUSCULAR; INTRAVENOUS; SUBCUTANEOUS at 11:28

## 2018-06-22 RX ADMIN — SODIUM CHLORIDE 75 MILLILITER(S): 9 INJECTION INTRAMUSCULAR; INTRAVENOUS; SUBCUTANEOUS at 15:01

## 2018-06-22 RX ADMIN — Medication 12.5 MILLIGRAM(S): at 17:08

## 2018-06-22 RX ADMIN — TICAGRELOR 90 MILLIGRAM(S): 90 TABLET ORAL at 17:08

## 2018-06-22 RX ADMIN — LATANOPROST 1 DROP(S): 0.05 SOLUTION/ DROPS OPHTHALMIC; TOPICAL at 21:36

## 2018-06-22 RX ADMIN — Medication 1 DROP(S): at 21:37

## 2018-06-22 RX ADMIN — TAMSULOSIN HYDROCHLORIDE 0.4 MILLIGRAM(S): 0.4 CAPSULE ORAL at 21:36

## 2018-06-22 RX ADMIN — SODIUM CHLORIDE 3 MILLILITER(S): 9 INJECTION INTRAMUSCULAR; INTRAVENOUS; SUBCUTANEOUS at 22:37

## 2018-06-22 RX ADMIN — Medication 81 MILLIGRAM(S): at 12:21

## 2018-06-22 NOTE — PROGRESS NOTE ADULT - SUBJECTIVE AND OBJECTIVE BOX
_________________________________________________________________________________________  ========>>  M E D I C A L   A T T E N D I N G    F O L L O W  U P  N O T E  <<=========  -----------------------------------------------------------------------------------------------------    - Patient seen and examined by me approximately thirty minutes ago.   - In summary, patient is a 72y year old man who originally presented with CP  - Patient overall doing well, chest pain free, transferred off CCU already, no new c/o    ==================>> REVIEW OF SYSTEM <<=================    GEN: no fever, no chills, no pain  RESP: no SOB, no cough, no sputum  CVS: no chest pain, no palpitations, no edema  GI: no abdominal pain, no nausea, no constipation, no diarrhea  : no dysuria, no frequency, no hematuria  Neuro: no headache, no dizziness  Derm : no itching, no rash    ==================>> PHYSICAL EXAM <<=================    GEN: A&O X 3 , NAD , comfortable  HEENT: NCAT, PERRL, MMM, hearing intact  Neck: supple , no JVD  CVS: S1S2 , regular , No M/R/G appreciated  PULM: CTA B/L,  no W/R/R appreciated  ABD.: soft. non tender, non distended,  bowel sounds present  Extrem: intact pulses , no edema, b/l LE warm to touch, + right groin ecchymosis spreading / dissipating   PSYCH : normal mood,  not anxious      ==================>> MEDICATIONS <<====================    artificial  tears Solution 1 Drop(s) Both EYES three times a day  aspirin enteric coated 81 milliGRAM(s) Oral daily  atorvastatin 80 milliGRAM(s) Oral at bedtime  chlorhexidine 4% Liquid 1 Application(s) Topical <User Schedule>  latanoprost 0.005% Ophthalmic Solution 1 Drop(s) Both EYES at bedtime  metoprolol tartrate 12.5 milliGRAM(s) Oral two times a day  sodium chloride 0.9% lock flush 3 milliLiter(s) IV Push every 8 hours  sodium chloride 0.9%. 500 milliLiter(s) IV Continuous <Continuous>  tamsulosin 0.4 milliGRAM(s) Oral at bedtime  ticagrelor 90 milliGRAM(s) Oral two times a day    ==================>> VITAL SIGNS <<==================    Vital Signs Last 24 Hrs  T(C): 36.3 (06-22-18 @ 05:29)  T(F): 97.4 (06-22-18 @ 05:29), Max: 98.1 (06-21-18 @ 15:56)  HR: 71 (06-22-18 @ 05:29) (67 - 77)  BP: 122/77 (06-22-18 @ 05:29)  BP(mean): 80 (06-21-18 @ 15:00) (59 - 81)  RR: 18 (06-22-18 @ 05:29) (16 - 30)  SpO2: 99% (06-22-18 @ 05:29) (95% - 100%)       ==================>> LAB AND IMAGING <<==================                        12.0   5.93  )-----------( 113      ( 22 Jun 2018 07:00 )             36.5        06-22    137  |  104  |  21  ----------------------------<  96  3.8   |  20<L>  |  1.01    Ca    8.2<L>      22 Jun 2018 07:00  Phos  2.2     06-21  Mg     2.1     06-22    ___________________________________________________________________________________  ===============>>  A S S E S S M E N T   A N D   P L A N <<===============  ------------------------------------------------------------------------------------------    **CAD, STEMI, h/o HTN, HLD, arrythmia post PPM,   ---CCU / cardio appreciated, for cath  and reattempt for PCI today  --- Continue Current medications and monitor.   --- pt off pressors and stable  ---tele monitor  ---echo  ---monitor vitals closely    **BPH  ---flomax    -GI/DVT Prophylaxis.    --------------------------------------------  Case discussed with pt  Education given on findings and plan of care  ___________________________  H. POOJA Lemons.  Pager: [phone]

## 2018-06-23 VITALS
OXYGEN SATURATION: 100 % | RESPIRATION RATE: 17 BRPM | DIASTOLIC BLOOD PRESSURE: 66 MMHG | HEART RATE: 56 BPM | TEMPERATURE: 97 F | SYSTOLIC BLOOD PRESSURE: 110 MMHG

## 2018-06-23 LAB
ALBUMIN SERPL ELPH-MCNC: 3.3 G/DL — SIGNIFICANT CHANGE UP (ref 3.3–5)
ALP SERPL-CCNC: 65 U/L — SIGNIFICANT CHANGE UP (ref 40–120)
ALT FLD-CCNC: 21 U/L — SIGNIFICANT CHANGE UP (ref 4–41)
AST SERPL-CCNC: 40 U/L — SIGNIFICANT CHANGE UP (ref 4–40)
BILIRUB SERPL-MCNC: 1.8 MG/DL — HIGH (ref 0.2–1.2)
BUN SERPL-MCNC: 19 MG/DL — SIGNIFICANT CHANGE UP (ref 7–23)
CALCIUM SERPL-MCNC: 8.7 MG/DL — SIGNIFICANT CHANGE UP (ref 8.4–10.5)
CHLORIDE SERPL-SCNC: 103 MMOL/L — SIGNIFICANT CHANGE UP (ref 98–107)
CO2 SERPL-SCNC: 20 MMOL/L — LOW (ref 22–31)
CREAT SERPL-MCNC: 0.94 MG/DL — SIGNIFICANT CHANGE UP (ref 0.5–1.3)
GLUCOSE SERPL-MCNC: 85 MG/DL — SIGNIFICANT CHANGE UP (ref 70–99)
HCT VFR BLD CALC: 37.6 % — LOW (ref 39–50)
HGB BLD-MCNC: 12.9 G/DL — LOW (ref 13–17)
MCHC RBC-ENTMCNC: 28.5 PG — SIGNIFICANT CHANGE UP (ref 27–34)
MCHC RBC-ENTMCNC: 34.3 % — SIGNIFICANT CHANGE UP (ref 32–36)
MCV RBC AUTO: 83.2 FL — SIGNIFICANT CHANGE UP (ref 80–100)
NRBC # FLD: 0 — SIGNIFICANT CHANGE UP
PLATELET # BLD AUTO: 121 K/UL — LOW (ref 150–400)
PMV BLD: 11.4 FL — SIGNIFICANT CHANGE UP (ref 7–13)
POTASSIUM SERPL-MCNC: 4.1 MMOL/L — SIGNIFICANT CHANGE UP (ref 3.5–5.3)
POTASSIUM SERPL-SCNC: 4.1 MMOL/L — SIGNIFICANT CHANGE UP (ref 3.5–5.3)
PROT SERPL-MCNC: 6.6 G/DL — SIGNIFICANT CHANGE UP (ref 6–8.3)
RBC # BLD: 4.52 M/UL — SIGNIFICANT CHANGE UP (ref 4.2–5.8)
RBC # FLD: 13.4 % — SIGNIFICANT CHANGE UP (ref 10.3–14.5)
SODIUM SERPL-SCNC: 139 MMOL/L — SIGNIFICANT CHANGE UP (ref 135–145)
WBC # BLD: 6.13 K/UL — SIGNIFICANT CHANGE UP (ref 3.8–10.5)
WBC # FLD AUTO: 6.13 K/UL — SIGNIFICANT CHANGE UP (ref 3.8–10.5)

## 2018-06-23 PROCEDURE — 93279 PRGRMG DEV EVAL PM/LDLS PM: CPT | Mod: 26,GC

## 2018-06-23 RX ORDER — LANOLIN ALCOHOL/MO/W.PET/CERES
3 CREAM (GRAM) TOPICAL ONCE
Qty: 0 | Refills: 0 | Status: COMPLETED | OUTPATIENT
Start: 2018-06-23 | End: 2018-06-23

## 2018-06-23 RX ORDER — LATANOPROST 0.05 MG/ML
1 SOLUTION/ DROPS OPHTHALMIC; TOPICAL
Qty: 2.5 | Refills: 0
Start: 2018-06-23 | End: 2018-07-22

## 2018-06-23 RX ORDER — ATORVASTATIN CALCIUM 80 MG/1
1 TABLET, FILM COATED ORAL
Qty: 30 | Refills: 0
Start: 2018-06-23 | End: 2018-07-22

## 2018-06-23 RX ORDER — TICAGRELOR 90 MG/1
1 TABLET ORAL
Qty: 60 | Refills: 0
Start: 2018-06-23 | End: 2018-07-22

## 2018-06-23 RX ORDER — ASPIRIN/CALCIUM CARB/MAGNESIUM 324 MG
1 TABLET ORAL
Qty: 0 | Refills: 0 | COMMUNITY

## 2018-06-23 RX ORDER — ASPIRIN/CALCIUM CARB/MAGNESIUM 324 MG
1 TABLET ORAL
Qty: 0 | Refills: 0 | DISCHARGE
Start: 2018-06-23

## 2018-06-23 RX ORDER — AMLODIPINE BESYLATE 2.5 MG/1
1 TABLET ORAL
Qty: 0 | Refills: 0 | COMMUNITY

## 2018-06-23 RX ADMIN — Medication 3 MILLIGRAM(S): at 01:56

## 2018-06-23 RX ADMIN — Medication 1 DROP(S): at 11:29

## 2018-06-23 RX ADMIN — CHLORHEXIDINE GLUCONATE 1 APPLICATION(S): 213 SOLUTION TOPICAL at 16:13

## 2018-06-23 RX ADMIN — SODIUM CHLORIDE 3 MILLILITER(S): 9 INJECTION INTRAMUSCULAR; INTRAVENOUS; SUBCUTANEOUS at 11:29

## 2018-06-23 RX ADMIN — SODIUM CHLORIDE 3 MILLILITER(S): 9 INJECTION INTRAMUSCULAR; INTRAVENOUS; SUBCUTANEOUS at 06:40

## 2018-06-23 RX ADMIN — Medication 12.5 MILLIGRAM(S): at 06:39

## 2018-06-23 RX ADMIN — Medication 81 MILLIGRAM(S): at 11:29

## 2018-06-23 RX ADMIN — Medication 1 DROP(S): at 06:40

## 2018-06-23 RX ADMIN — TICAGRELOR 90 MILLIGRAM(S): 90 TABLET ORAL at 06:39

## 2018-06-23 NOTE — PHYSICAL THERAPY INITIAL EVALUATION ADULT - PERTINENT HX OF CURRENT PROBLEM, REHAB EVAL
71 y/o M w/ PMH of CABG x3(1998) and PPM c/o exertional chest pain  x 2 days. On day of admission, at 3AM patient woke up from sleep acute nausea. STEMI alert activated upon receipt of EKG for concern inferior wall MI.  Patient underwent cardiac cath 6/22/18. Patient underwent successful PTCI of SVG distal anastomosis to OM1 for residual disease post inferior wall STEMI.

## 2018-06-23 NOTE — PHYSICAL THERAPY INITIAL EVALUATION ADULT - PLANNED THERAPY INTERVENTIONS, PT EVAL
gait training/strengthening/Patient left semi reclined in bed in NAD; call bell in reach; +bed check; Obil, RN aware/bed mobility training/transfer training/balance training

## 2018-06-23 NOTE — PROGRESS NOTE ADULT - SUBJECTIVE AND OBJECTIVE BOX
Subjective: Patient seen and examined. No new events except as noted.   S/P PTCI of SVG to OM1 via left femoral artery  Feels well no cp or sob  no arrtythmia  MEDICATIONS:  MEDICATIONS  (STANDING):  artificial  tears Solution 1 Drop(s) Both EYES three times a day  aspirin enteric coated 81 milliGRAM(s) Oral daily  atorvastatin 80 milliGRAM(s) Oral at bedtime  chlorhexidine 4% Liquid 1 Application(s) Topical <User Schedule>  latanoprost 0.005% Ophthalmic Solution 1 Drop(s) Both EYES at bedtime  metoprolol tartrate 12.5 milliGRAM(s) Oral two times a day  sodium chloride 0.9% lock flush 3 milliLiter(s) IV Push every 8 hours  sodium chloride 0.9%. 500 milliLiter(s) (75 mL/Hr) IV Continuous <Continuous>  sodium chloride 0.9%. 500 milliLiter(s) (75 mL/Hr) IV Continuous <Continuous>  tamsulosin 0.4 milliGRAM(s) Oral at bedtime  ticagrelor 90 milliGRAM(s) Oral two times a day      PHYSICAL EXAM:  T(C): 36.3 (06-23-18 @ 06:00), Max: 36.3 (06-23-18 @ 06:00)  HR: 63 (06-23-18 @ 06:00) (63 - 65)  BP: 133/75 (06-23-18 @ 06:00) (109/76 - 133/75)  RR: 17 (06-23-18 @ 06:00) (16 - 17)  SpO2: 100% (06-23-18 @ 06:00) (99% - 100%)  Wt(kg): --  I&O's Summary    21 Jun 2018 07:01  -  22 Jun 2018 07:00  --------------------------------------------------------  IN: 360 mL / OUT: 200 mL / NET: 160 mL          Appearance: Normal	  HEENT:   Normal oral mucosa, PERRL, EOMI	  Cardiovascular: Normal S1 S2, No JVD, No murmurs ,  Respiratory: Lungs clear to auscultation, normal effort 	  Gastrointestinal:  Soft, Non-tender, + BS	  Skin: No rashes, No ecchymoses, No cyanosis, warm to touch  Musculoskeletal: Normal range of motion, normal strength  Psychiatry:  Mood & affect appropriate  Ext: ecchymotic right and left groin no hematoma  Peripheral pulses palpable 2+ bilaterally      LABS:    CARDIAC MARKERS:  CARDIAC MARKERS ( 21 Jun 2018 05:50 )  x     / x     / 363 u/L / 15.53 ng/mL / x      CARDIAC MARKERS ( 20 Jun 2018 20:06 )  x     / x     / 414 u/L / 20.51 ng/mL / x      CARDIAC MARKERS ( 20 Jun 2018 13:10 )  x     / x     / 453 u/L / 25.37 ng/mL / x                                    12.0   5.93  )-----------( 113      ( 22 Jun 2018 07:00 )             36.5     06-22    137  |  104  |  21  ----------------------------<  96  3.8   |  20<L>  |  1.01    Ca    8.2<L>      22 Jun 2018 07:00  Mg     2.1     06-22      proBNP:   Lipid Profile:   HgA1c:   TSH:           TELEMETRY: 	    ECG:  NSR

## 2018-06-23 NOTE — CHART NOTE - NSCHARTNOTEFT_GEN_A_CORE
Device Interrogation Performed        [employer]                           Date/Time: 6/23/18    :     [employer]      Model:    [id number]           Mode:   VVI            Rate:     50 bpm       Ventricular Lead(s):  RV Lead: R wave amplitude:       4.5 mv          Impedence:    650    Ohms      Threshold:      0.3 V@  0.9           ms     Battery Status:      GOOD     Underlying Rhythm:   Complete heart block     Events/Observation: no events detected.     Impression/Plan: Currently in complete heart block.   Normal PPM function. Normal sensing and pacing via iterative testing. Good battery status. Excellent threshold capture.  No reprogramming.    Case discussed w/ Dr. Boyer  No EP objections for discharge

## 2018-06-23 NOTE — PROGRESS NOTE ADULT - SUBJECTIVE AND OBJECTIVE BOX
M E D I C A L   A T T E N D I N G    F O L L O W    U P   N O T E                                     ------------------------------------------------------------------------------------------------    patient evaluated by me, case discussed with team, chart, medications, and physical exam reviewed, labs / tests  and vitals reviewed by me, as bellow.   Patient is stable for discharge today. noted Cardio and discussed with EP: PPM function normal per EP.  Patient to follow up with  Dr Casarez.   discussed with Cardio, EP, Pt, Family at bedside, RN and PA.   See discharge document for full note.      ==================>> MEDICATIONS <<====================    artificial  tears Solution 1 Drop(s) Both EYES three times a day  aspirin enteric coated 81 milliGRAM(s) Oral daily  atorvastatin 80 milliGRAM(s) Oral at bedtime  chlorhexidine 4% Liquid 1 Application(s) Topical <User Schedule>  latanoprost 0.005% Ophthalmic Solution 1 Drop(s) Both EYES at bedtime  metoprolol tartrate 12.5 milliGRAM(s) Oral two times a day  sodium chloride 0.9% lock flush 3 milliLiter(s) IV Push every 8 hours  sodium chloride 0.9%. 500 milliLiter(s) IV Continuous <Continuous>  sodium chloride 0.9%. 500 milliLiter(s) IV Continuous <Continuous>  tamsulosin 0.4 milliGRAM(s) Oral at bedtime  ticagrelor 90 milliGRAM(s) Oral two times a day    ==================>> VITAL SIGNS <<==================    T(C): 36.3 (06-23-18 @ 11:34), Max: 36.3 (06-23-18 @ 06:00)  HR: 56 (06-23-18 @ 11:34) (56 - 65)  BP: 110/66 (06-23-18 @ 11:34) (109/76 - 133/75)  RR: 17 (06-23-18 @ 11:34) (16 - 17)  SpO2: 100% (06-23-18 @ 11:34) (99% - 100%)     ==================>> LAB AND IMAGING <<==================                        12.9   6.13  )-----------( 121      ( 23 Jun 2018 05:45 )             37.6        06-23    139  |  103  |  19  ----------------------------<  85  4.1   |  20<L>  |  0.94    Ca    8.7      23 Jun 2018 05:45  Mg     2.1     06-22    TPro  6.6  /  Alb  3.3  /  TBili  1.8<H>  /  DBili  x   /  AST  40  /  ALT  21  /  AlkPhos  65  06-23    Creatinine:  0.94  <<==, 1.01  <<==, 1.04  <<==, 1.12  <<==, 1.08  <<==, 1.43  <<==   Sodium:   139  <==, 137  <==, 138  <==, 135  <==, 136  <==, 135  <==, 130  <==      WBC count:   6.13 <<== ,  5.93 <<== ,  6.67 <<== ,  7.69 <<== ,  9.17 <<== ,  6.82 <<==    Hemoglobin:   12.9 <<==,  12.0 <<==,  13.3 <<==,  13.2 <<==,  13.7 <<==,  15.9 <<==   platelets:  121 <==, 113 <==, 115 <==, 128 <==, 131 <==, 126 <==, 143 <==      CATH reviewed

## 2018-06-23 NOTE — PROGRESS NOTE ADULT - ASSESSMENT
1. S/P IWMI LV fucntion near normal closed SVG to PDA  2. S/P PTCI of SVG to OM1  3. hemodynamically stable    Recommend  discharge home today change to toprol xl 25  continue other meds  followup with Dr. Groves 1. S/P IWMI LV fucntion near normal closed SVG to PDA  2. S/P PTCI of SVG to OM1  3. hemodynamically stable    Recommend  discharge home today change to toprol xl 25  continue other meds  followup with Dr. Germain Gomez:  pacer spikes not capturing undersensing  pklease check pacer prior to discharge and adjust appropriately-EP should evaluate

## 2018-06-23 NOTE — CHART NOTE - NSCHARTNOTEFT_GEN_A_CORE
Status post Cath, Left femoral site without bleeding or hematoma.  Dressing is intact.  Positive Pulses.  Will continue to monitor.                                                                                                                                                  NUNU Luong, RPA-C [id number]

## 2018-06-23 NOTE — PROGRESS NOTE ADULT - PROVIDER SPECIALTY LIST ADULT
CCU
CCU
Cardiology
Cardiology
Internal Medicine
Cardiology

## 2018-06-23 NOTE — PHYSICAL THERAPY INITIAL EVALUATION ADULT - ACTIVE RANGE OF MOTION EXAMINATION, REHAB EVAL
No
bilateral upper extremity Active ROM was WFL (within functional limits)/bilateral  lower extremity Active ROM was WFL (within functional limits)

## 2018-08-01 ENCOUNTER — APPOINTMENT (OUTPATIENT)
Dept: ELECTROPHYSIOLOGY | Facility: CLINIC | Age: 73
End: 2018-08-01

## 2018-10-15 ENCOUNTER — APPOINTMENT (OUTPATIENT)
Dept: ELECTROPHYSIOLOGY | Facility: CLINIC | Age: 73
End: 2018-10-15
Payer: MEDICARE

## 2018-10-15 VITALS — DIASTOLIC BLOOD PRESSURE: 89 MMHG | SYSTOLIC BLOOD PRESSURE: 138 MMHG | RESPIRATION RATE: 14 BRPM | HEART RATE: 56 BPM

## 2018-10-15 DIAGNOSIS — I25.2 OLD MYOCARDIAL INFARCTION: ICD-10-CM

## 2018-10-15 PROCEDURE — 93279 PRGRMG DEV EVAL PM/LDLS PM: CPT

## 2018-10-15 RX ORDER — ATORVASTATIN CALCIUM 80 MG/1
80 TABLET, FILM COATED ORAL
Refills: 0 | Status: ACTIVE | COMMUNITY

## 2019-01-16 ENCOUNTER — INPATIENT (INPATIENT)
Facility: HOSPITAL | Age: 74
LOS: 0 days | Discharge: HOME | DRG: 313 | End: 2019-01-17
Attending: INTERNAL MEDICINE | Admitting: INTERNAL MEDICINE
Payer: COMMERCIAL

## 2019-01-16 VITALS
SYSTOLIC BLOOD PRESSURE: 101 MMHG | TEMPERATURE: 98 F | HEART RATE: 70 BPM | DIASTOLIC BLOOD PRESSURE: 70 MMHG | RESPIRATION RATE: 18 BRPM | OXYGEN SATURATION: 97 %

## 2019-01-16 DIAGNOSIS — I25.10 ATHEROSCLEROTIC HEART DISEASE OF NATIVE CORONARY ARTERY WITHOUT ANGINA PECTORIS: Chronic | ICD-10-CM

## 2019-01-16 DIAGNOSIS — I63.9 CEREBRAL INFARCTION, UNSPECIFIED: ICD-10-CM

## 2019-01-16 DIAGNOSIS — Z95.0 PRESENCE OF CARDIAC PACEMAKER: Chronic | ICD-10-CM

## 2019-01-16 DIAGNOSIS — N17.9 ACUTE KIDNEY FAILURE, UNSPECIFIED: ICD-10-CM

## 2019-01-16 DIAGNOSIS — I10 ESSENTIAL (PRIMARY) HYPERTENSION: ICD-10-CM

## 2019-01-16 DIAGNOSIS — E86.0 DEHYDRATION: ICD-10-CM

## 2019-01-16 DIAGNOSIS — N40.0 BENIGN PROSTATIC HYPERPLASIA WITHOUT LOWER URINARY TRACT SYMPTOMS: ICD-10-CM

## 2019-01-16 DIAGNOSIS — E78.5 HYPERLIPIDEMIA, UNSPECIFIED: ICD-10-CM

## 2019-01-16 DIAGNOSIS — Z29.9 ENCOUNTER FOR PROPHYLACTIC MEASURES, UNSPECIFIED: ICD-10-CM

## 2019-01-16 DIAGNOSIS — I25.10 ATHEROSCLEROTIC HEART DISEASE OF NATIVE CORONARY ARTERY WITHOUT ANGINA PECTORIS: ICD-10-CM

## 2019-01-16 DIAGNOSIS — Z90.49 ACQUIRED ABSENCE OF OTHER SPECIFIED PARTS OF DIGESTIVE TRACT: Chronic | ICD-10-CM

## 2019-01-16 DIAGNOSIS — I24.9 ACUTE ISCHEMIC HEART DISEASE, UNSPECIFIED: ICD-10-CM

## 2019-01-16 LAB
ALBUMIN SERPL ELPH-MCNC: 3.7 G/DL — SIGNIFICANT CHANGE UP (ref 3.5–5)
ALP SERPL-CCNC: 92 U/L — SIGNIFICANT CHANGE UP (ref 40–120)
ALT FLD-CCNC: 48 U/L DA — SIGNIFICANT CHANGE UP (ref 10–60)
ANION GAP SERPL CALC-SCNC: 9 MMOL/L — SIGNIFICANT CHANGE UP (ref 5–17)
APTT BLD: 30.6 SEC — SIGNIFICANT CHANGE UP (ref 27.5–36.3)
AST SERPL-CCNC: 41 U/L — HIGH (ref 10–40)
BASOPHILS # BLD AUTO: 0 K/UL — SIGNIFICANT CHANGE UP (ref 0–0.2)
BASOPHILS NFR BLD AUTO: 0.4 % — SIGNIFICANT CHANGE UP (ref 0–2)
BILIRUB SERPL-MCNC: 3 MG/DL — HIGH (ref 0.2–1.2)
BUN SERPL-MCNC: 27 MG/DL — HIGH (ref 7–18)
CALCIUM SERPL-MCNC: 8.7 MG/DL — SIGNIFICANT CHANGE UP (ref 8.4–10.5)
CHLORIDE SERPL-SCNC: 109 MMOL/L — HIGH (ref 96–108)
CK MB BLD-MCNC: 3.2 % — SIGNIFICANT CHANGE UP (ref 0–3.5)
CK MB CFR SERPL CALC: 3.6 NG/ML — SIGNIFICANT CHANGE UP (ref 0–3.6)
CK SERPL-CCNC: 114 U/L — SIGNIFICANT CHANGE UP (ref 35–232)
CO2 SERPL-SCNC: 23 MMOL/L — SIGNIFICANT CHANGE UP (ref 22–31)
CREAT SERPL-MCNC: 1.44 MG/DL — HIGH (ref 0.5–1.3)
EOSINOPHIL # BLD AUTO: 0 K/UL — SIGNIFICANT CHANGE UP (ref 0–0.5)
EOSINOPHIL NFR BLD AUTO: 0.3 % — SIGNIFICANT CHANGE UP (ref 0–6)
GLUCOSE SERPL-MCNC: 96 MG/DL — SIGNIFICANT CHANGE UP (ref 70–99)
HCT VFR BLD CALC: 54.8 % — HIGH (ref 39–50)
HGB BLD-MCNC: 18.4 G/DL — HIGH (ref 13–17)
INR BLD: 1.24 RATIO — HIGH (ref 0.88–1.16)
LIDOCAIN IGE QN: 108 U/L — SIGNIFICANT CHANGE UP (ref 73–393)
LYMPHOCYTES # BLD AUTO: 0.5 K/UL — LOW (ref 1–3.3)
LYMPHOCYTES # BLD AUTO: 6.6 % — LOW (ref 13–44)
MCHC RBC-ENTMCNC: 29.5 PG — SIGNIFICANT CHANGE UP (ref 27–34)
MCHC RBC-ENTMCNC: 33.5 GM/DL — SIGNIFICANT CHANGE UP (ref 32–36)
MCV RBC AUTO: 88.1 FL — SIGNIFICANT CHANGE UP (ref 80–100)
MONOCYTES # BLD AUTO: 0.2 K/UL — SIGNIFICANT CHANGE UP (ref 0–0.9)
MONOCYTES NFR BLD AUTO: 3.3 % — SIGNIFICANT CHANGE UP (ref 2–14)
NEUTROPHILS # BLD AUTO: 6.3 K/UL — SIGNIFICANT CHANGE UP (ref 1.8–7.4)
NEUTROPHILS NFR BLD AUTO: 89.4 % — HIGH (ref 43–77)
NT-PROBNP SERPL-SCNC: 4234 PG/ML — HIGH (ref 0–125)
PLATELET # BLD AUTO: 142 K/UL — LOW (ref 150–400)
POTASSIUM SERPL-MCNC: 4.9 MMOL/L — SIGNIFICANT CHANGE UP (ref 3.5–5.3)
POTASSIUM SERPL-SCNC: 4.9 MMOL/L — SIGNIFICANT CHANGE UP (ref 3.5–5.3)
PROT SERPL-MCNC: 7.6 G/DL — SIGNIFICANT CHANGE UP (ref 6–8.3)
PROTHROM AB SERPL-ACNC: 13.9 SEC — HIGH (ref 10–12.9)
RBC # BLD: 6.22 M/UL — HIGH (ref 4.2–5.8)
RBC # FLD: 13.8 % — SIGNIFICANT CHANGE UP (ref 10.3–14.5)
SODIUM SERPL-SCNC: 141 MMOL/L — SIGNIFICANT CHANGE UP (ref 135–145)
TROPONIN I SERPL-MCNC: 0.06 NG/ML — HIGH (ref 0–0.04)
TROPONIN I SERPL-MCNC: 0.13 NG/ML — HIGH (ref 0–0.04)
WBC # BLD: 7 K/UL — SIGNIFICANT CHANGE UP (ref 3.8–10.5)
WBC # FLD AUTO: 7 K/UL — SIGNIFICANT CHANGE UP (ref 3.8–10.5)

## 2019-01-16 PROCEDURE — 99285 EMERGENCY DEPT VISIT HI MDM: CPT

## 2019-01-16 PROCEDURE — 71046 X-RAY EXAM CHEST 2 VIEWS: CPT | Mod: 26

## 2019-01-16 RX ORDER — SODIUM CHLORIDE 9 MG/ML
500 INJECTION INTRAMUSCULAR; INTRAVENOUS; SUBCUTANEOUS ONCE
Qty: 0 | Refills: 0 | Status: COMPLETED | OUTPATIENT
Start: 2019-01-16 | End: 2019-01-16

## 2019-01-16 RX ORDER — TAMSULOSIN HYDROCHLORIDE 0.4 MG/1
0.4 CAPSULE ORAL AT BEDTIME
Qty: 0 | Refills: 0 | Status: DISCONTINUED | OUTPATIENT
Start: 2019-01-16 | End: 2019-01-17

## 2019-01-16 RX ORDER — METOPROLOL TARTRATE 50 MG
50 TABLET ORAL DAILY
Qty: 0 | Refills: 0 | Status: DISCONTINUED | OUTPATIENT
Start: 2019-01-16 | End: 2019-01-16

## 2019-01-16 RX ORDER — SODIUM CHLORIDE 9 MG/ML
1000 INJECTION INTRAMUSCULAR; INTRAVENOUS; SUBCUTANEOUS
Qty: 0 | Refills: 0 | Status: DISCONTINUED | OUTPATIENT
Start: 2019-01-16 | End: 2019-01-17

## 2019-01-16 RX ORDER — ENOXAPARIN SODIUM 100 MG/ML
40 INJECTION SUBCUTANEOUS DAILY
Qty: 0 | Refills: 0 | Status: DISCONTINUED | OUTPATIENT
Start: 2019-01-16 | End: 2019-01-17

## 2019-01-16 RX ORDER — SODIUM CHLORIDE 9 MG/ML
250 INJECTION INTRAMUSCULAR; INTRAVENOUS; SUBCUTANEOUS ONCE
Qty: 0 | Refills: 0 | Status: DISCONTINUED | OUTPATIENT
Start: 2019-01-16 | End: 2019-01-16

## 2019-01-16 RX ORDER — METOPROLOL TARTRATE 50 MG
12.5 TABLET ORAL
Qty: 0 | Refills: 0 | Status: DISCONTINUED | OUTPATIENT
Start: 2019-01-16 | End: 2019-01-17

## 2019-01-16 RX ORDER — ASPIRIN/CALCIUM CARB/MAGNESIUM 324 MG
81 TABLET ORAL DAILY
Qty: 0 | Refills: 0 | Status: DISCONTINUED | OUTPATIENT
Start: 2019-01-16 | End: 2019-01-17

## 2019-01-16 RX ORDER — ATORVASTATIN CALCIUM 80 MG/1
80 TABLET, FILM COATED ORAL AT BEDTIME
Qty: 0 | Refills: 0 | Status: DISCONTINUED | OUTPATIENT
Start: 2019-01-16 | End: 2019-01-17

## 2019-01-16 RX ORDER — LATANOPROST 0.05 MG/ML
1 SOLUTION/ DROPS OPHTHALMIC; TOPICAL AT BEDTIME
Qty: 0 | Refills: 0 | Status: DISCONTINUED | OUTPATIENT
Start: 2019-01-16 | End: 2019-01-17

## 2019-01-16 RX ADMIN — SODIUM CHLORIDE 500 MILLILITER(S): 9 INJECTION INTRAMUSCULAR; INTRAVENOUS; SUBCUTANEOUS at 15:54

## 2019-01-16 RX ADMIN — SODIUM CHLORIDE 500 MILLILITER(S): 9 INJECTION INTRAMUSCULAR; INTRAVENOUS; SUBCUTANEOUS at 15:55

## 2019-01-16 NOTE — ED PROVIDER NOTE - PMH
BPH (benign prostatic hypertrophy)    Coronary artery disease    CVA (cerebral vascular accident)  10-15 years ago -- No residual  Difficult intubation  patient has notation with pacemaker information that he is a difficult intubation but no specifics given  Hyperlipidemia    Hypertension    Sinoatrial node dysfunction  guidant pacemaker  [employer] I Entra SSIRO IS-1, Comp, 1198, [id number]  leads Bipolar 3.2 LP Passive RV 52, 59 cm, 4260, [id number]  implanted oon 11/21/05 by Dr. Clark at [facility]  Snoring  SWATI precautions -- responds affirmatively to STOP BANG questionnaire -- admits to intermittent snoring; age > 50; gender, male; h/o htn  Vasovagal syncope

## 2019-01-16 NOTE — H&P ADULT - HISTORY OF PRESENT ILLNESS
54y/o M w/ PMH of CABG x3(1998), complete Heart block and PPM, BPH, CVA, who c/o exertional chest pain  x 2 days. 52y/o M w/ PMHx of CABG x3(1998), complete Heart block and PPM, BPH, CVA, who c/o exertional chest pain  x 2 days.  Pt states  at 4am this morning he awoke with sharp, substernal chest discomfort, non-radiating, 6/10 intensity with assoc nausea and NBNB vomiting x 1.  Pt did not take anything for the pain bec he could not keep anything down,  He states the pain self resolved after several hours, but when he spoke with daughter she suggested he present for further investigation.  Pt saw cardiologist, Dr. Casarez, 1 week prior and underwent secho at that time which was normal. Has not had a stress test in over a year. Pt underwent coronary cath in June for Post-inf wall STEMI.  Pt denies SOB, dizziness, palpitations, extremity weakness/ numbness, HA, blurred vision, slurred speech, abd pain, diarrhea, urinary symptoms, fever, recent travel, or syncope.    In the ED, pt presents in no acute distress, vitals WNL, and EKG NSR 74y/o M w/ PMHx of CABG x3(1998), complete Heart block and PPM, BPH, CVA, who c/o exertional chest pain  x 2 days.  Pt states  at 4am this morning he awoke with sharp, substernal chest discomfort, non-radiating, 6/10 intensity with assoc nausea and NBNB vomiting x 1.  Pt did not take anything for the pain bec he could not keep anything down,  He states the pain self resolved after several hours, but when he spoke with daughter she suggested he present for further investigation.  Pt saw cardiologist, Dr. Casarez, 1 week prior and underwent secho at that time which was normal. Has not had a stress test in over a year. Pt underwent coronary cath in June for Post-inf wall STEMI.  Pt denies SOB, dizziness, palpitations, extremity weakness/ numbness, HA, blurred vision, slurred speech, abd pain, diarrhea, urinary symptoms, fever, recent travel, or syncope.    In the ED, pt presents in no acute distress, vitals WNL, and EKG NSR

## 2019-01-16 NOTE — H&P ADULT - PROBLEM SELECTOR PLAN 1
-CP x 1 day- substernal   -Hx of CAD, and STEMI in the PAST s/p CABG and pacer for complete HB  -EKG NSR  -Trop 1: 0.063; f/u T2@8p, and T3@ 2a  -asa, statin, bb  -Serial EKG's  -Tele monitoring  -Hold echo- managing team to contact OP cardiologist for echo report in AM- Dr. Casarez  -Pt instructed not to consume caffeine in AM in case of stress  -Cardio: Dr. Anderson

## 2019-01-16 NOTE — ED PROVIDER NOTE - PROGRESS NOTE DETAILS
patient lab sig for + trop in setting of being hemoconcentrate, admitted to dr wilks for dehydration and r.o acs

## 2019-01-16 NOTE — ED PROVIDER NOTE - MEDICAL DECISION MAKING DETAILS
patient presenting with sob and dizziness. concern for acs vs lyte abnormality vs anemia, will obtian lab, ekg, cxr. clinically patient not fluid overload, will give small bolus and reassses

## 2019-01-16 NOTE — ED ADULT NURSE REASSESSMENT NOTE - NS ED NURSE REASSESS COMMENT FT1
Patient remains hemodynamically stable and in no acute distress, speaking in full sentences and breathing comfortably in room air. Patient tolerating PO well and verbalizes no medical complaints.

## 2019-01-16 NOTE — ED ADULT NURSE NOTE - NSIMPLEMENTINTERV_GEN_ALL_ED
Implemented All Universal Safety Interventions:  [facility] to call system. Call bell, personal items and telephone within reach. Instruct patient to call for assistance. Room bathroom lighting operational. Non-slip footwear when patient is off stretcher. Physically safe environment: no spills, clutter or unnecessary equipment. Stretcher in lowest position, wheels locked, appropriate side rails in place.

## 2019-01-16 NOTE — H&P ADULT - RS GEN PE MLT RESP DETAILS PC
airway patent/no chest wall tenderness/good air movement/no rhonchi/no wheezes/no intercostal retractions/no rales/respirations non-labored/clear to auscultation bilaterally/breath sounds equal

## 2019-01-16 NOTE — H&P ADULT - NEUROLOGICAL DETAILS
responds to pain/responds to verbal commands/alert and oriented x 3/cranial nerves intact/sensation intact

## 2019-01-16 NOTE — H&P ADULT - PROBLEM SELECTOR PLAN 2
-Likely pre-renal from dehydration, but will send urine lytes  -Pt is actively urinating   -s/p 500ml bolus x 2 in ED  -c/w gentle hydration x 12 hrs   -f/u UA  -f/u Renal US  -f/u BMP for improvement

## 2019-01-16 NOTE — ED PROVIDER NOTE - OBJECTIVE STATEMENT
73 y.o presenting with 1 day of sob and dizziness. denies cp, abd pain. nausea, vomiting, diarrhea. endorses history of htn and open heart surgery for cad.

## 2019-01-16 NOTE — ED ADULT NURSE NOTE - OBJECTIVE STATEMENT
Patient came to the ED a/o x 3 ambulates c/o vomiting with dizziness noted. Pt denies any chest pains noted.

## 2019-01-16 NOTE — H&P ADULT - PMH
BPH (benign prostatic hypertrophy)    Coronary artery disease    CVA (cerebral vascular accident)  10-15 years ago -- No residual  Difficult intubation  patient has notation with pacemaker information that he is a difficult intubation but no specifics given  Hyperlipidemia    Hypertension    Sinoatrial node dysfunction  guidant pacemaker  [employer] I [employer]ra SSIRO IS-1, Comp, 1198, [id number]  leads Bipolar 3.2 LP Passive RV 52, 59 cm, 4260, [id number]  implanted oon 11/21/05 by Dr. Clark at [facility]  Snoring  SWATI precautions -- responds affirmatively to STOP BANG questionnaire -- admits to intermittent snoring; age > 50; gender, male; h/o htn  Vasovagal syncope

## 2019-01-16 NOTE — H&P ADULT - ASSESSMENT
54y/o M w/ PMHx of CABG x3(1998), complete Heart block and PPM, BPH, CVA, who chest pain starting this AM.  In the ED, pt presents in no acute distress, vitals WNL, and EKG NSR.  Pt remains afebrile w/o leukocytosis.  Remaining labs sig for Hb of 18, and Creat of 1.4 likely pre renal from dehydration.  Trop 1 0.063.  Given pt's sig cardiac history, ACS should be ruled out.      Pt will be admitted to tele for ACS to r/o NSTEMI and for management of GINNY 72y/o M w/ PMHx of CABG x3(1998), complete Heart block and PPM, BPH, CVA, who chest pain starting this AM.  In the ED, pt presents in no acute distress, vitals WNL, and EKG NSR.  Pt remains afebrile w/o leukocytosis.  Remaining labs sig for Hb of 18, and Creat of 1.4 likely pre renal from dehydration.  Trop 1 0.063.  Given pt's sig cardiac history, ACS should be ruled out.      Pt will be admitted to tele for ACS to r/o NSTEMI and for management of GINNY

## 2019-01-16 NOTE — H&P ADULT - NSHPPHYSICALEXAM_GEN_ALL_CORE
Vital Signs Last 24 Hrs  T(C): 36.5 (16 Jan 2019 15:05), Max: 36.5 (16 Jan 2019 15:05)  T(F): 97.7 (16 Jan 2019 15:05), Max: 97.7 (16 Jan 2019 15:05)  HR: 94 (16 Jan 2019 15:05) (70 - 94)  BP: 107/73 (16 Jan 2019 15:05) (101/70 - 107/73)  RR: 18 (16 Jan 2019 15:05) (18 - 18)  SpO2: 95% (16 Jan 2019 15:05) (95% - 97%)

## 2019-01-17 ENCOUNTER — TRANSCRIPTION ENCOUNTER (OUTPATIENT)
Age: 74
End: 2019-01-17

## 2019-01-17 VITALS — HEART RATE: 64 BPM

## 2019-01-17 LAB
ANION GAP SERPL CALC-SCNC: 6 MMOL/L — SIGNIFICANT CHANGE UP (ref 5–17)
APTT BLD: >200 SEC — CRITICAL HIGH (ref 27.5–36.3)
BUN SERPL-MCNC: 28 MG/DL — HIGH (ref 7–18)
CALCIUM SERPL-MCNC: 8.2 MG/DL — LOW (ref 8.4–10.5)
CHLORIDE SERPL-SCNC: 109 MMOL/L — HIGH (ref 96–108)
CHOLEST SERPL-MCNC: 66 MG/DL — SIGNIFICANT CHANGE UP (ref 10–199)
CK MB BLD-MCNC: 3.5 % — SIGNIFICANT CHANGE UP (ref 0–3.5)
CK MB BLD-MCNC: 3.6 % — HIGH (ref 0–3.5)
CK MB CFR SERPL CALC: 3.9 NG/ML — HIGH (ref 0–3.6)
CK MB CFR SERPL CALC: 4.8 NG/ML — HIGH (ref 0–3.6)
CK SERPL-CCNC: 110 U/L — SIGNIFICANT CHANGE UP (ref 35–232)
CK SERPL-CCNC: 134 U/L — SIGNIFICANT CHANGE UP (ref 35–232)
CO2 SERPL-SCNC: 27 MMOL/L — SIGNIFICANT CHANGE UP (ref 22–31)
CREAT SERPL-MCNC: 1.18 MG/DL — SIGNIFICANT CHANGE UP (ref 0.5–1.3)
GLUCOSE SERPL-MCNC: 86 MG/DL — SIGNIFICANT CHANGE UP (ref 70–99)
HBA1C BLD-MCNC: 6 % — HIGH (ref 4–5.6)
HCT VFR BLD CALC: 49.1 % — SIGNIFICANT CHANGE UP (ref 39–50)
HDLC SERPL-MCNC: 45 MG/DL — SIGNIFICANT CHANGE UP
HGB BLD-MCNC: 15.7 G/DL — SIGNIFICANT CHANGE UP (ref 13–17)
LIPID PNL WITH DIRECT LDL SERPL: 15 MG/DL — SIGNIFICANT CHANGE UP
MAGNESIUM SERPL-MCNC: 2.3 MG/DL — SIGNIFICANT CHANGE UP (ref 1.6–2.6)
MCHC RBC-ENTMCNC: 28.9 PG — SIGNIFICANT CHANGE UP (ref 27–34)
MCHC RBC-ENTMCNC: 32 GM/DL — SIGNIFICANT CHANGE UP (ref 32–36)
MCV RBC AUTO: 90.4 FL — SIGNIFICANT CHANGE UP (ref 80–100)
PHOSPHATE SERPL-MCNC: 2.9 MG/DL — SIGNIFICANT CHANGE UP (ref 2.5–4.5)
PLATELET # BLD AUTO: 113 K/UL — LOW (ref 150–400)
POTASSIUM SERPL-MCNC: 4.1 MMOL/L — SIGNIFICANT CHANGE UP (ref 3.5–5.3)
POTASSIUM SERPL-SCNC: 4.1 MMOL/L — SIGNIFICANT CHANGE UP (ref 3.5–5.3)
RBC # BLD: 5.43 M/UL — SIGNIFICANT CHANGE UP (ref 4.2–5.8)
RBC # FLD: 13.9 % — SIGNIFICANT CHANGE UP (ref 10.3–14.5)
SODIUM SERPL-SCNC: 142 MMOL/L — SIGNIFICANT CHANGE UP (ref 135–145)
TOTAL CHOLESTEROL/HDL RATIO MEASUREMENT: 1.5 RATIO — LOW (ref 3.4–9.6)
TRIGL SERPL-MCNC: 31 MG/DL — SIGNIFICANT CHANGE UP (ref 10–149)
TROPONIN I SERPL-MCNC: 0.06 NG/ML — HIGH (ref 0–0.04)
TROPONIN I SERPL-MCNC: 0.16 NG/ML — HIGH (ref 0–0.04)
TSH SERPL-MCNC: 0.47 UU/ML — SIGNIFICANT CHANGE UP (ref 0.34–4.82)
WBC # BLD: 4.5 K/UL — SIGNIFICANT CHANGE UP (ref 3.8–10.5)
WBC # FLD AUTO: 4.5 K/UL — SIGNIFICANT CHANGE UP (ref 3.8–10.5)

## 2019-01-17 PROCEDURE — 83735 ASSAY OF MAGNESIUM: CPT

## 2019-01-17 PROCEDURE — 36415 COLL VENOUS BLD VENIPUNCTURE: CPT

## 2019-01-17 PROCEDURE — 83880 ASSAY OF NATRIURETIC PEPTIDE: CPT

## 2019-01-17 PROCEDURE — A9502: CPT

## 2019-01-17 PROCEDURE — 99285 EMERGENCY DEPT VISIT HI MDM: CPT | Mod: 25

## 2019-01-17 PROCEDURE — 84443 ASSAY THYROID STIM HORMONE: CPT

## 2019-01-17 PROCEDURE — 83036 HEMOGLOBIN GLYCOSYLATED A1C: CPT

## 2019-01-17 PROCEDURE — 85730 THROMBOPLASTIN TIME PARTIAL: CPT

## 2019-01-17 PROCEDURE — 93017 CV STRESS TEST TRACING ONLY: CPT

## 2019-01-17 PROCEDURE — 78452 HT MUSCLE IMAGE SPECT MULT: CPT

## 2019-01-17 PROCEDURE — 85027 COMPLETE CBC AUTOMATED: CPT

## 2019-01-17 PROCEDURE — 80048 BASIC METABOLIC PNL TOTAL CA: CPT

## 2019-01-17 PROCEDURE — 84484 ASSAY OF TROPONIN QUANT: CPT

## 2019-01-17 PROCEDURE — 80053 COMPREHEN METABOLIC PANEL: CPT

## 2019-01-17 PROCEDURE — 83690 ASSAY OF LIPASE: CPT

## 2019-01-17 PROCEDURE — G0378: CPT

## 2019-01-17 PROCEDURE — 82550 ASSAY OF CK (CPK): CPT

## 2019-01-17 PROCEDURE — 93018 CV STRESS TEST I&R ONLY: CPT

## 2019-01-17 PROCEDURE — 78452 HT MUSCLE IMAGE SPECT MULT: CPT | Mod: 26

## 2019-01-17 PROCEDURE — 93016 CV STRESS TEST SUPVJ ONLY: CPT

## 2019-01-17 PROCEDURE — 84100 ASSAY OF PHOSPHORUS: CPT

## 2019-01-17 PROCEDURE — 82553 CREATINE MB FRACTION: CPT

## 2019-01-17 PROCEDURE — 71046 X-RAY EXAM CHEST 2 VIEWS: CPT

## 2019-01-17 PROCEDURE — 80061 LIPID PANEL: CPT

## 2019-01-17 PROCEDURE — 85610 PROTHROMBIN TIME: CPT

## 2019-01-17 PROCEDURE — 82607 VITAMIN B-12: CPT

## 2019-01-17 PROCEDURE — 93005 ELECTROCARDIOGRAM TRACING: CPT

## 2019-01-17 RX ORDER — HEPARIN SODIUM 5000 [USP'U]/ML
4100 INJECTION INTRAVENOUS; SUBCUTANEOUS ONCE
Qty: 0 | Refills: 0 | Status: COMPLETED | OUTPATIENT
Start: 2019-01-17 | End: 2019-01-17

## 2019-01-17 RX ORDER — HEPARIN SODIUM 5000 [USP'U]/ML
INJECTION INTRAVENOUS; SUBCUTANEOUS
Qty: 25000 | Refills: 0 | Status: DISCONTINUED | OUTPATIENT
Start: 2019-01-17 | End: 2019-01-17

## 2019-01-17 RX ORDER — HEPARIN SODIUM 5000 [USP'U]/ML
4100 INJECTION INTRAVENOUS; SUBCUTANEOUS EVERY 6 HOURS
Qty: 0 | Refills: 0 | Status: DISCONTINUED | OUTPATIENT
Start: 2019-01-17 | End: 2019-01-17

## 2019-01-17 RX ADMIN — SODIUM CHLORIDE 65 MILLILITER(S): 9 INJECTION INTRAMUSCULAR; INTRAVENOUS; SUBCUTANEOUS at 00:25

## 2019-01-17 RX ADMIN — HEPARIN SODIUM 600 UNIT(S)/HR: 5000 INJECTION INTRAVENOUS; SUBCUTANEOUS at 09:15

## 2019-01-17 RX ADMIN — HEPARIN SODIUM 800 UNIT(S)/HR: 5000 INJECTION INTRAVENOUS; SUBCUTANEOUS at 01:36

## 2019-01-17 RX ADMIN — Medication 12.5 MILLIGRAM(S): at 16:49

## 2019-01-17 RX ADMIN — Medication 1 DROP(S): at 06:12

## 2019-01-17 RX ADMIN — Medication 1 DROP(S): at 13:19

## 2019-01-17 RX ADMIN — Medication 81 MILLIGRAM(S): at 13:19

## 2019-01-17 RX ADMIN — HEPARIN SODIUM 0 UNIT(S)/HR: 5000 INJECTION INTRAVENOUS; SUBCUTANEOUS at 08:11

## 2019-01-17 RX ADMIN — LATANOPROST 1 DROP(S): 0.05 SOLUTION/ DROPS OPHTHALMIC; TOPICAL at 00:24

## 2019-01-17 RX ADMIN — ATORVASTATIN CALCIUM 80 MILLIGRAM(S): 80 TABLET, FILM COATED ORAL at 00:23

## 2019-01-17 RX ADMIN — Medication 1 DROP(S): at 00:24

## 2019-01-17 RX ADMIN — TAMSULOSIN HYDROCHLORIDE 0.4 MILLIGRAM(S): 0.4 CAPSULE ORAL at 00:24

## 2019-01-17 RX ADMIN — HEPARIN SODIUM 4100 UNIT(S): 5000 INJECTION INTRAVENOUS; SUBCUTANEOUS at 01:36

## 2019-01-17 NOTE — DISCHARGE NOTE ADULT - CARE PLAN
Principal Discharge DX:	Chest pain  Goal:	medication compliance  Assessment and plan of treatment:	You presented with chest pain. Your EKG did not show ST elevation. Your stress test was negative. You recently had ECHO at your cardiologist. You are recommended to follow up with PCP and cardiologist  Secondary Diagnosis:	GINNY (acute kidney injury)  Assessment and plan of treatment:	Your creatinine on admission was 1.44, most likely due to dehydration. On discharge, it trended down to 1.18. YOu are recommended to drik a lot of fluids and stay hydrated and follow up with PCP  Secondary Diagnosis:	Coronary artery disease  Assessment and plan of treatment:	You are recommended to take medications as advised and follow up with PCp  Secondary Diagnosis:	Hyperlipidemia  Assessment and plan of treatment:	You are recommended to take medications as advised and follow up with PCp

## 2019-01-17 NOTE — DISCHARGE NOTE ADULT - MEDICATION SUMMARY - MEDICATIONS TO TAKE
I will START or STAY ON the medications listed below when I get home from the hospital:    aspirin 81 mg oral delayed release tablet  -- 1 tab(s) by mouth once a day  -- Indication: For Antiplatelet     Flomax 0.4 mg oral capsule  -- 1 cap(s) by mouth once a day in the am  -- Indication: For BPH (benign prostatic hypertrophy)    atorvastatin 80 mg oral tablet  -- 1 tab(s) by mouth once a day (at bedtime)  -- Indication: For Hyperlipidemia     ticagrelor 90 mg oral tablet  -- 1 tab(s) by mouth 2 times a day  -- Indication: For Antiplatelet     Metoprolol Succinate ER 50 mg oral tablet, extended release  -- 1 tab(s) by mouth once a day in the am  -- Indication: For CAD     ocular lubricant ophthalmic solution  -- 1 drop(s) to each affected eye 3 times a day  -- Indication: For Topical     latanoprost 0.005% ophthalmic solution  -- 1 drop(s) in each eye once a day (at bedtime)   -- Indication: For Topical

## 2019-01-17 NOTE — CHART NOTE - NSCHARTNOTEFT_GEN_A_CORE
Pt troponin started trending up from 0.063 to 0.128  EKG was ordered   Lovenox stopped and started on heparin drip

## 2019-01-17 NOTE — DISCHARGE NOTE ADULT - HOSPITAL COURSE
54y/o M w/ PMHx of CABG x3(1998), complete Heart block and PPM, BPH, CVA, who c/o exertional chest pain  x 2 days.  Pt states  at 4am this morning he awoke with sharp, substernal chest discomfort, non-radiating, 6/10 intensity with assoc nausea and NBNB vomiting x 1.  Pt did not take anything for the pain bec he could not keep anything down,  He states the pain self resolved after several hours, but when he spoke with daughter she suggested he present for further investigation.  Pt saw cardiologist, Dr. Casarez, 1 week prior and underwent secho at that time which was normal. Has not had a stress test in over a year. Pt underwent coronary cath in June for Post-inf wall STEMI.  Pt denies SOB, dizziness, palpitations, extremity weakness/ numbness, HA, blurred vision, slurred speech, abd pain, diarrhea, urinary symptoms, fever, recent travel, or syncope.    patient was admitted fo CP , Stress test; Normal, ACS ruled out   He recently had ECHO at his cardiologist, which was normal, as per the patient. ACS ruled out  GINNY: Creatinine was elevated on admission. most likely due to dehydration. Creatinine came back to normal on repeat labs    Patient is medically stable to be discharged . Case is discussed with the attending 74y/o M w/ PMHx of CABG x3(1998), complete Heart block and PPM, BPH, CVA, who c/o exertional chest pain  x 2 days.  Pt states  at 4am this morning he awoke with sharp, substernal chest discomfort, non-radiating, 6/10 intensity with assoc nausea and NBNB vomiting x 1.  Pt did not take anything for the pain bec he could not keep anything down,  He states the pain self resolved after several hours, but when he spoke with daughter she suggested he present for further investigation.  Pt saw cardiologist, Dr. Casarez, 1 week prior and underwent secho at that time which was normal. Has not had a stress test in over a year. Pt underwent coronary cath in June for Post-inf wall STEMI.  Pt denies SOB, dizziness, palpitations, extremity weakness/ numbness, HA, blurred vision, slurred speech, abd pain, diarrhea, urinary symptoms, fever, recent travel, or syncope.    patient was admitted fo CP , Stress test; Normal, ACS ruled out   He recently had ECHO at his cardiologist, which was normal, as per the patient. ACS ruled out  GINNY: Creatinine was elevated on admission. most likely due to dehydration. Creatinine came back to normal on repeat labs    Patient is medically stable to be discharged . Case is discussed with the attending

## 2019-01-17 NOTE — DISCHARGE NOTE ADULT - PATIENT PORTAL LINK FT
You can access the [facility], offered by [facility], by registering with the following website: [email or web address]

## 2019-01-17 NOTE — DISCHARGE NOTE ADULT - PLAN OF CARE
medication compliance You presented with chest pain. Your EKG did not show ST elevation. Your stress test was negative. You recently had ECHO at your cardiologist. You are recommended to follow up with PCP and cardiologist Your creatinine on admission was 1.44, most likely due to dehydration. On discharge, it trended down to 1.18. YOu are recommended to drik a lot of fluids and stay hydrated and follow up with PCP You are recommended to take medications as advised and follow up with PCp

## 2019-01-18 LAB — VIT B12 SERPL-MCNC: 349 PG/ML — SIGNIFICANT CHANGE UP (ref 232–1245)

## 2019-02-28 ENCOUNTER — EMERGENCY (EMERGENCY)
Facility: HOSPITAL | Age: 74
LOS: 1 days | Discharge: HOME | End: 2019-02-28
Attending: EMERGENCY MEDICINE
Payer: COMMERCIAL

## 2019-02-28 VITALS
HEIGHT: 66 IN | WEIGHT: 136.91 LBS | DIASTOLIC BLOOD PRESSURE: 151 MMHG | OXYGEN SATURATION: 97 % | RESPIRATION RATE: 18 BRPM | SYSTOLIC BLOOD PRESSURE: 191 MMHG | HEART RATE: 103 BPM

## 2019-02-28 VITALS — SYSTOLIC BLOOD PRESSURE: 150 MMHG | DIASTOLIC BLOOD PRESSURE: 110 MMHG

## 2019-02-28 DIAGNOSIS — Z90.49 ACQUIRED ABSENCE OF OTHER SPECIFIED PARTS OF DIGESTIVE TRACT: Chronic | ICD-10-CM

## 2019-02-28 DIAGNOSIS — I25.10 ATHEROSCLEROTIC HEART DISEASE OF NATIVE CORONARY ARTERY WITHOUT ANGINA PECTORIS: Chronic | ICD-10-CM

## 2019-02-28 DIAGNOSIS — Z95.0 PRESENCE OF CARDIAC PACEMAKER: Chronic | ICD-10-CM

## 2019-02-28 PROCEDURE — 99283 EMERGENCY DEPT VISIT LOW MDM: CPT | Mod: 25

## 2019-02-28 PROCEDURE — 99284 EMERGENCY DEPT VISIT MOD MDM: CPT | Mod: 25

## 2019-02-28 PROCEDURE — 12011 RPR F/E/E/N/L/M 2.5 CM/<: CPT

## 2019-02-28 PROCEDURE — 90471 IMMUNIZATION ADMIN: CPT

## 2019-02-28 PROCEDURE — 90715 TDAP VACCINE 7 YRS/> IM: CPT

## 2019-02-28 RX ORDER — METOPROLOL TARTRATE 50 MG
25 TABLET ORAL ONCE
Qty: 0 | Refills: 0 | Status: COMPLETED | OUTPATIENT
Start: 2019-02-28 | End: 2019-02-28

## 2019-02-28 RX ORDER — TETANUS TOXOID, REDUCED DIPHTHERIA TOXOID AND ACELLULAR PERTUSSIS VACCINE, ADSORBED 5; 2.5; 8; 8; 2.5 [IU]/.5ML; [IU]/.5ML; UG/.5ML; UG/.5ML; UG/.5ML
0.5 SUSPENSION INTRAMUSCULAR ONCE
Qty: 0 | Refills: 0 | Status: COMPLETED | OUTPATIENT
Start: 2019-02-28 | End: 2019-02-28

## 2019-02-28 RX ADMIN — TETANUS TOXOID, REDUCED DIPHTHERIA TOXOID AND ACELLULAR PERTUSSIS VACCINE, ADSORBED 0.5 MILLILITER(S): 5; 2.5; 8; 8; 2.5 SUSPENSION INTRAMUSCULAR at 18:15

## 2019-02-28 RX ADMIN — Medication 25 MILLIGRAM(S): at 19:27

## 2019-02-28 NOTE — ED PROVIDER NOTE - PHYSICAL EXAMINATION
mouth- lower lip inner mucosa-1.5 cm lac, lower lip-1.5 cm lac with flap, active bleeding  Lt lat incisor-sl loose  neck- NT to palp., mandible- NT to palp.,

## 2019-02-28 NOTE — ED PROVIDER NOTE - ENMT, MLM
Airway patent, Nasal mucosa clear. Mouth with normal mucosa. Throat has no vesicles, no oropharyngeal exudates and uvula is midline.  upper denture missing, no bleeding seen on lip

## 2019-02-28 NOTE — ED ADULT NURSE NOTE - CHPI ED NUR SYMPTOMS NEG
no loss of consciousness/no vomiting/no numbness/no confusion/no deformity/no tingling/no weakness/no fever

## 2019-02-28 NOTE — ED ADULT NURSE NOTE - NSIMPLEMENTINTERV_GEN_ALL_ED
Implemented All Fall with Harm Risk Interventions:  [facility] to call system. Call bell, personal items and telephone within reach. Instruct patient to call for assistance. Room bathroom lighting operational. Non-slip footwear when patient is off stretcher. Physically safe environment: no spills, clutter or unnecessary equipment. Stretcher in lowest position, wheels locked, appropriate side rails in place. Provide visual cue, wrist band, yellow gown, etc. Monitor gait and stability. Monitor for mental status changes and reorient to person, place, and time. Review medications for side effects contributing to fall risk. Reinforce activity limits and safety measures with patient and family. Provide visual clues: red socks.

## 2019-02-28 NOTE — ED PROVIDER NOTE - OBJECTIVE STATEMENT
74 yo male w/PMHx CABG x4 (1998), CVA (2011), and stent (2018) that walked into ED due to laceration from mechanical fall. Pt was walking outside when he tripped and his top dentures hit his lower lip, causing two separate bleeding lacerations. Pt denies LOC or head trauma. No other injuries on his body or pain in any other locations, including chest. Doesn't recall last tetanus shot.

## 2019-02-28 NOTE — ED ADULT NURSE REASSESSMENT NOTE - NS ED NURSE REASSESS COMMENT FT1
patient continued with elevated B/P scheduled for discharge Dr. Gibson made aware will order medication and reassess

## 2019-02-28 NOTE — ED PROVIDER NOTE - PMH
BPH (benign prostatic hypertrophy)    Coronary artery disease    CVA (cerebral vascular accident)  10-15 years ago -- No residual  Difficult intubation  patient has notation with pacemaker information that he is a difficult intubation but no specifics given  Hyperlipidemia    Hypertension    Sinoatrial node dysfunction  guidant pacemaker  [employer] I [employer]ra SSIRO IS-1, Comp, 1198, [id number]  leads Bipolar 3.2 LP Passive RV 52, 59 cm, 4260, [id number]  implanted oon 11/21/05 by Dr. Clark at [facility]  Snoring  SWATI precautions -- responds affirmatively to STOP BANG questionnaire -- admits to intermittent snoring; age > 50; gender, male; h/o htn  Vasovagal syncope

## 2019-02-28 NOTE — ED ADULT NURSE NOTE - OBJECTIVE STATEMENT
Patient present to ED s/p fall after tripping over metal object in street fall hitting face with laceration swelling and bleeding to lower lip. patient denies any pain

## 2019-04-15 ENCOUNTER — APPOINTMENT (OUTPATIENT)
Dept: ELECTROPHYSIOLOGY | Facility: CLINIC | Age: 74
End: 2019-04-15

## 2019-05-06 ENCOUNTER — APPOINTMENT (OUTPATIENT)
Dept: ELECTROPHYSIOLOGY | Facility: CLINIC | Age: 74
End: 2019-05-06
Payer: MEDICARE

## 2019-05-06 PROCEDURE — 93280 PM DEVICE PROGR EVAL DUAL: CPT

## 2019-05-06 RX ORDER — LOSARTAN POTASSIUM 50 MG/1
50 TABLET, FILM COATED ORAL
Refills: 0 | Status: ACTIVE | COMMUNITY

## 2019-11-08 ENCOUNTER — APPOINTMENT (OUTPATIENT)
Dept: ELECTROPHYSIOLOGY | Facility: CLINIC | Age: 74
End: 2019-11-08
Payer: MEDICARE

## 2019-11-08 VITALS — DIASTOLIC BLOOD PRESSURE: 79 MMHG | SYSTOLIC BLOOD PRESSURE: 130 MMHG | RESPIRATION RATE: 14 BRPM | HEART RATE: 60 BPM

## 2019-11-08 PROCEDURE — 93279 PRGRMG DEV EVAL PM/LDLS PM: CPT

## 2019-12-31 ENCOUNTER — EMERGENCY (EMERGENCY)
Facility: HOSPITAL | Age: 74
LOS: 1 days | Discharge: HOME | End: 2019-12-31
Attending: EMERGENCY MEDICINE
Payer: COMMERCIAL

## 2019-12-31 VITALS
WEIGHT: 128.09 LBS | DIASTOLIC BLOOD PRESSURE: 62 MMHG | HEART RATE: 80 BPM | SYSTOLIC BLOOD PRESSURE: 92 MMHG | TEMPERATURE: 98 F | RESPIRATION RATE: 18 BRPM | OXYGEN SATURATION: 96 % | HEIGHT: 64 IN

## 2019-12-31 DIAGNOSIS — Z90.49 ACQUIRED ABSENCE OF OTHER SPECIFIED PARTS OF DIGESTIVE TRACT: Chronic | ICD-10-CM

## 2019-12-31 DIAGNOSIS — I25.10 ATHEROSCLEROTIC HEART DISEASE OF NATIVE CORONARY ARTERY WITHOUT ANGINA PECTORIS: Chronic | ICD-10-CM

## 2019-12-31 DIAGNOSIS — Z95.0 PRESENCE OF CARDIAC PACEMAKER: Chronic | ICD-10-CM

## 2019-12-31 PROCEDURE — 99053 MED SERV 10PM-8AM 24 HR FAC: CPT

## 2019-12-31 PROCEDURE — 99285 EMERGENCY DEPT VISIT HI MDM: CPT

## 2020-01-01 VITALS
HEART RATE: 81 BPM | DIASTOLIC BLOOD PRESSURE: 67 MMHG | RESPIRATION RATE: 18 BRPM | SYSTOLIC BLOOD PRESSURE: 98 MMHG | OXYGEN SATURATION: 96 % | TEMPERATURE: 97 F

## 2020-01-01 LAB
ALBUMIN SERPL ELPH-MCNC: 3.8 G/DL — SIGNIFICANT CHANGE UP (ref 3.5–5)
ALP SERPL-CCNC: 96 U/L — SIGNIFICANT CHANGE UP (ref 40–120)
ALT FLD-CCNC: 46 U/L DA — SIGNIFICANT CHANGE UP (ref 10–60)
ANION GAP SERPL CALC-SCNC: 7 MMOL/L — SIGNIFICANT CHANGE UP (ref 5–17)
APTT BLD: 30.4 SEC — SIGNIFICANT CHANGE UP (ref 27.5–36.3)
AST SERPL-CCNC: 36 U/L — SIGNIFICANT CHANGE UP (ref 10–40)
BASOPHILS # BLD AUTO: 0.05 K/UL — SIGNIFICANT CHANGE UP (ref 0–0.2)
BASOPHILS NFR BLD AUTO: 0.8 % — SIGNIFICANT CHANGE UP (ref 0–2)
BILIRUB SERPL-MCNC: 1.7 MG/DL — HIGH (ref 0.2–1.2)
BUN SERPL-MCNC: 23 MG/DL — HIGH (ref 7–18)
CALCIUM SERPL-MCNC: 9.4 MG/DL — SIGNIFICANT CHANGE UP (ref 8.4–10.5)
CHLORIDE SERPL-SCNC: 109 MMOL/L — HIGH (ref 96–108)
CO2 SERPL-SCNC: 27 MMOL/L — SIGNIFICANT CHANGE UP (ref 22–31)
CREAT SERPL-MCNC: 1.85 MG/DL — HIGH (ref 0.5–1.3)
EOSINOPHIL # BLD AUTO: 0.05 K/UL — SIGNIFICANT CHANGE UP (ref 0–0.5)
EOSINOPHIL NFR BLD AUTO: 0.8 % — SIGNIFICANT CHANGE UP (ref 0–6)
GLUCOSE SERPL-MCNC: 82 MG/DL — SIGNIFICANT CHANGE UP (ref 70–99)
HCT VFR BLD CALC: 47.9 % — SIGNIFICANT CHANGE UP (ref 39–50)
HGB BLD-MCNC: 15.6 G/DL — SIGNIFICANT CHANGE UP (ref 13–17)
IMM GRANULOCYTES NFR BLD AUTO: 0.3 % — SIGNIFICANT CHANGE UP (ref 0–1.5)
INR BLD: 1.19 RATIO — HIGH (ref 0.88–1.16)
LYMPHOCYTES # BLD AUTO: 0.93 K/UL — LOW (ref 1–3.3)
LYMPHOCYTES # BLD AUTO: 15.1 % — SIGNIFICANT CHANGE UP (ref 13–44)
MAGNESIUM SERPL-MCNC: 2.4 MG/DL — SIGNIFICANT CHANGE UP (ref 1.6–2.6)
MCHC RBC-ENTMCNC: 29.2 PG — SIGNIFICANT CHANGE UP (ref 27–34)
MCHC RBC-ENTMCNC: 32.6 GM/DL — SIGNIFICANT CHANGE UP (ref 32–36)
MCV RBC AUTO: 89.7 FL — SIGNIFICANT CHANGE UP (ref 80–100)
MONOCYTES # BLD AUTO: 0.74 K/UL — SIGNIFICANT CHANGE UP (ref 0–0.9)
MONOCYTES NFR BLD AUTO: 12.1 % — SIGNIFICANT CHANGE UP (ref 2–14)
NEUTROPHILS # BLD AUTO: 4.35 K/UL — SIGNIFICANT CHANGE UP (ref 1.8–7.4)
NEUTROPHILS NFR BLD AUTO: 70.9 % — SIGNIFICANT CHANGE UP (ref 43–77)
NRBC # BLD: 0 /100 WBCS — SIGNIFICANT CHANGE UP (ref 0–0)
PLATELET # BLD AUTO: 142 K/UL — LOW (ref 150–400)
POTASSIUM SERPL-MCNC: 4.8 MMOL/L — SIGNIFICANT CHANGE UP (ref 3.5–5.3)
POTASSIUM SERPL-SCNC: 4.8 MMOL/L — SIGNIFICANT CHANGE UP (ref 3.5–5.3)
PROT SERPL-MCNC: 7.2 G/DL — SIGNIFICANT CHANGE UP (ref 6–8.3)
PROTHROM AB SERPL-ACNC: 13.3 SEC — HIGH (ref 10–12.9)
RBC # BLD: 5.34 M/UL — SIGNIFICANT CHANGE UP (ref 4.2–5.8)
RBC # FLD: 13.5 % — SIGNIFICANT CHANGE UP (ref 10.3–14.5)
SODIUM SERPL-SCNC: 143 MMOL/L — SIGNIFICANT CHANGE UP (ref 135–145)
TROPONIN I SERPL-MCNC: 0.03 NG/ML — SIGNIFICANT CHANGE UP (ref 0–0.04)
WBC # BLD: 6.14 K/UL — SIGNIFICANT CHANGE UP (ref 3.8–10.5)
WBC # FLD AUTO: 6.14 K/UL — SIGNIFICANT CHANGE UP (ref 3.8–10.5)

## 2020-01-01 PROCEDURE — 71046 X-RAY EXAM CHEST 2 VIEWS: CPT

## 2020-01-01 PROCEDURE — 85610 PROTHROMBIN TIME: CPT

## 2020-01-01 PROCEDURE — 85027 COMPLETE CBC AUTOMATED: CPT

## 2020-01-01 PROCEDURE — 80053 COMPREHEN METABOLIC PANEL: CPT

## 2020-01-01 PROCEDURE — 83735 ASSAY OF MAGNESIUM: CPT

## 2020-01-01 PROCEDURE — 93005 ELECTROCARDIOGRAM TRACING: CPT

## 2020-01-01 PROCEDURE — 71046 X-RAY EXAM CHEST 2 VIEWS: CPT | Mod: 26

## 2020-01-01 PROCEDURE — 84484 ASSAY OF TROPONIN QUANT: CPT

## 2020-01-01 PROCEDURE — 85730 THROMBOPLASTIN TIME PARTIAL: CPT

## 2020-01-01 PROCEDURE — 36415 COLL VENOUS BLD VENIPUNCTURE: CPT

## 2020-01-01 PROCEDURE — 99284 EMERGENCY DEPT VISIT MOD MDM: CPT | Mod: 25

## 2020-01-01 PROCEDURE — 82962 GLUCOSE BLOOD TEST: CPT

## 2020-01-01 RX ORDER — SODIUM CHLORIDE 9 MG/ML
1000 INJECTION INTRAMUSCULAR; INTRAVENOUS; SUBCUTANEOUS ONCE
Refills: 0 | Status: COMPLETED | OUTPATIENT
Start: 2020-01-01 | End: 2020-01-01

## 2020-01-01 RX ADMIN — SODIUM CHLORIDE 1000 MILLILITER(S): 9 INJECTION INTRAMUSCULAR; INTRAVENOUS; SUBCUTANEOUS at 00:57

## 2020-01-01 NOTE — ED PROVIDER NOTE - PATIENT PORTAL LINK FT
You can access the FollowMyHealth Patient Portal offered by [facility] by registering at the following website: [email or web address]. By joining [employer]’s FollowMyHealth portal, you will also be able to view your health information using other applications (apps) compatible with our system.

## 2020-01-01 NOTE — ED PROVIDER NOTE - CLINICAL SUMMARY MEDICAL DECISION MAKING FREE TEXT BOX
No anemia or source of bleeding, no gross electrolyte abnormalities. No e/o aortic outflow obstruction. No CP/SOB or signs to suggest PE. Noted ECG rhythm. No anemia or source of bleeding, no gross electrolyte abnormalities. No e/o aortic outflow obstruction. No CP/SOB or signs to suggest PE. Noted ECG rhythm, which is identical to his past ECG. Patient is well appearing, NAD, afebrile, hemodynamically stable. No more symptoms since prior to arrival. Offered admission for further w/u and to fully r/o cardiac etiology, declines at this time, he and family are aware of risks/benefits. Discharged with instructions in further symptomatic care, return precautions, and need for PMD and cardio f/u.

## 2020-01-01 NOTE — ED PROVIDER NOTE - OBJECTIVE STATEMENT
74yoM with h/o CAD s/p stent on ASA/Brilinta, CVA, sinoatrial node dysfunction with pacemaker, HTN, HLD, presents with syncope.  was sitting eating dinner tonight and felt lightheaded and then he passed out. Per sister in law he was unconscious for approx 5-7 minutes, sitting in his chair the entire time. When he awoke he had no confusion, and no h/o seizure or urinary incontinence or tongue biting. Pt  currently is back to normal. Denies CP, SOB, vomiting, diarrhea, abdominal pain, source of bleeding, leg pain or swelling, or any other symptoms.  has had 2 past syncopal episodes and was admitted for extensive w/u that was all negative.

## 2020-01-01 NOTE — ED PROVIDER NOTE - PMH
BPH (benign prostatic hypertrophy)    Coronary artery disease    CVA (cerebral vascular accident)  10-15 years ago -- No residual  Difficult intubation  patient has notation with pacemaker information that he is a difficult intubation but no specifics given  Hyperlipidemia    Hypertension    Sinoatrial node dysfunction  guidant pacemaker  [employer] I Entra SSIRO IS-1, Comp, 1198, [id number]  leads Bipolar 3.2 LP Passive RV 52, 59 cm, 4260, [id number]  implanted oon 11/21/05 by Dr. Clark at [facility]  Snoring  SWATI precautions -- responds affirmatively to STOP BANG questionnaire -- admits to intermittent snoring; age > 50; gender, male; h/o htn  Vasovagal syncope

## 2020-01-01 NOTE — ED PROVIDER NOTE - NSFOLLOWUPINSTRUCTIONS_ED_ALL_ED_FT
Please follow up with your primary care doctor and cardiologist in 1-2 days.  Please keep well hydrated.  Please return to the emergency department if you have more lightheadedness or passing out, chest pain, shortness of breath, vomiting, or any other symptoms.

## 2020-01-01 NOTE — ED PROVIDER NOTE - PHYSICAL EXAMINATION
Afebrile, borderline hypotension, saturating well  NAD, well appearing  Head NCAT  EOMI grossly, anicteric  MMM  No JVD  RRR, nml S1/S2, no m/r/g  Lungs CTAB, no w/r/r  Abd soft, NT, ND, nml BS, no rebound or guarding  AAO, CN's 3-12 grossly intact  VALDERRAMA spontaneously, no leg cyanosis or edema  Skin warm, well perfused, no rashes or hives

## 2020-04-10 ENCOUNTER — APPOINTMENT (OUTPATIENT)
Dept: ELECTROPHYSIOLOGY | Facility: CLINIC | Age: 75
End: 2020-04-10

## 2021-08-20 ENCOUNTER — NON-APPOINTMENT (OUTPATIENT)
Age: 76
End: 2021-08-20

## 2021-08-20 ENCOUNTER — APPOINTMENT (OUTPATIENT)
Dept: CARDIOLOGY | Facility: CLINIC | Age: 76
End: 2021-08-20
Payer: MEDICARE

## 2021-08-20 VITALS
BODY MASS INDEX: 21.17 KG/M2 | HEIGHT: 64 IN | SYSTOLIC BLOOD PRESSURE: 118 MMHG | DIASTOLIC BLOOD PRESSURE: 65 MMHG | HEART RATE: 63 BPM | OXYGEN SATURATION: 96 % | WEIGHT: 124 LBS

## 2021-08-20 PROCEDURE — 93000 ELECTROCARDIOGRAM COMPLETE: CPT

## 2021-08-20 PROCEDURE — 99204 OFFICE O/P NEW MOD 45 MIN: CPT

## 2021-08-20 RX ORDER — MULTIVIT-MIN/FOLIC/VIT K/LYCOP 400-300MCG
25 MCG TABLET ORAL
Refills: 0 | Status: ACTIVE | COMMUNITY

## 2021-08-20 RX ORDER — TICAGRELOR 90 MG/1
90 TABLET ORAL
Refills: 0 | Status: DISCONTINUED | COMMUNITY
End: 2021-08-20

## 2021-08-23 NOTE — ASSESSMENT
[FreeTextEntry1] : Norma Frost 76 years old chronic ischemic heart disease status post bypass surgery status post stents in 2018 status post pacemaker with change in battery several years ago presently asymptomatic without chest pain chest pressure or shortness of breath dizziness or syncope.  He is overall feeling well\par \par However his blood pressure today is on the low side.  It was initially 120 but later was .\par \par He has been on Brilinta for several years but is status post a stent over 3 years ago.\par \par Presently is hemodynamically stable but has not had his pacemaker checked for some time\par \par 1.  Status post coronary bypass surgery\par 2.  Status post inferior wall MI and unsuccessful recanalization of the SVG to the PDA\par 3.  Status post PTCI of the SVG to OM1 distal anastomosis\par 4.  Status post pacemaker has not been checked for over 2 years\par \par Overall patient is stable but I do not believe he requires to be on Brilinta but can remain on aspirin

## 2021-08-23 NOTE — REASON FOR VISIT
[FreeTextEntry1] : Norma Frost 76 years old previously seen by me in my old office and status post stent here for evaluation of his cardiac status.  I have not seen the patient for several years

## 2021-08-23 NOTE — HISTORY OF PRESENT ILLNESS
[FreeTextEntry1] : Norma is 76 years old with a long history of chronic ischemic heart disease.  He status post coronary bypass surgery many years ago and status post pacemaker single lead with a change in battery several years ago\par \par He presented sometime in 2018 with an acute MI unstable angina underwent catheterization and had stents placed\par He actually presented with an acute inferior wall MI.  Catheterization revealed that the CORBY to the LAD was widely patent, SVG to OM1 was patent with a distal anastomotic lesion of 90% and the SVG to the right coronary was totally occluded.  The SVG to the right coronary was considered to the culprit lesion.  I was unable to recanalize the right coronary graft despite easy ability to cross the lesion and thrombectomy.  He subsequently returned and had PTCI of the SVG to OM1\par \par 2D echo at a later date revealed normal LV function and a subsequent nuclear stress test revealed an area of inferior infarct versus attenuation but no significant ischemia\par \par His pacemaker is usually checked at [facility] but he has not had a check for several years\par \par The patient has not seen me for over 2 years because of COVID-19.  He does follow with his internist Dr. Carrizales.\par \par He denies shortness of breath, he denies dizziness or syncope, he denies chest pain.\par He has reasonable exercise tolerance but on occasion feels somewhat weak and tired.  He denies edema orthopnea PND\par \par EKG today reveals probable sinus rhythm possible underlying complete heart block with pacing at approximately 50 beats a minute.  His own heart rate does increase with activity\par

## 2021-08-23 NOTE — DISCUSSION/SUMMARY
[FreeTextEntry1] : 1.  Decrease his Cozaar to 25 mg a day\par 2.  Patient can stop Brilinta and just stay on aspirin perhaps to 81 mg a day as he is over 3 years status post MI and stenting\par 3.  The daughter will make an appointment for the patient for pacemaker check which is important\par 4.  Routine follow with me again in 3 months.  I will obtain the results of my prior records and his cardiac catheterization etc.

## 2021-08-23 NOTE — PHYSICAL EXAM
[Well Developed] : well developed [Well Nourished] : well nourished [Normal Conjunctiva] : normal conjunctiva [No Carotid Bruit] : no carotid bruit [Normal S1, S2] : normal S1, S2 [Clear Lung Fields] : clear lung fields [No Respiratory Distress] : no respiratory distress  [Soft] : abdomen soft [Non Tender] : non-tender [No Edema] : no edema [Moves all extremities] : moves all extremities [de-identified] : Elderly and thin [de-identified] : Heart rate 50 occasional premature beats no murmur no S3

## 2021-09-13 NOTE — DISCHARGE NOTE ADULT - OTHER SIGNIFICANT FINDINGS
[FreeTextEntry1] : I, Delores Avalos, acted solely as a scribe for Dr. Michael Caicedo on this date 09/13/2021.\par \par All medical record entries made by the Scribe were at my, Dr. Michael Caicedo, direction and personally dictated by me on 09/13/2021 . I have reviewed the chart and agree that the record accurately reflects my personal performance of the history, physical exam, assessment and plan. I have also personally directed, reviewed, and agreed with the chart.	\par 
As above

## 2021-12-03 ENCOUNTER — APPOINTMENT (OUTPATIENT)
Dept: CARDIOLOGY | Facility: CLINIC | Age: 76
End: 2021-12-03

## 2021-12-13 NOTE — PATIENT PROFILE ADULT - NSPROMEDSADMININFO_GEN_A_NUR
Patient seen and examined . Comfortable laying in the bed , non verbal , doesn't follow commands .     CC : transferred from [facility] for further mgmt of volvulus         MEDICATIONS  (STANDING):  albuterol/ipratropium for Nebulization 3 milliLiter(s) Nebulizer every 6 hours  cholecalciferol 2000 Unit(s) Oral daily  dextrose 40% Gel 15 Gram(s) Oral once  dextrose 5% + lactated ringers. 1000 milliLiter(s) (100 mL/Hr) IV Continuous <Continuous>  dextrose 5% + sodium chloride 0.9% 1000 milliLiter(s) (100 mL/Hr) IV Continuous <Continuous>  dextrose 5%. 1000 milliLiter(s) (50 mL/Hr) IV Continuous <Continuous>  dextrose 5%. 1000 milliLiter(s) (100 mL/Hr) IV Continuous <Continuous>  dextrose 50% Injectable 25 Gram(s) IV Push once  dextrose 50% Injectable 12.5 Gram(s) IV Push once  dextrose 50% Injectable 25 Gram(s) IV Push once  enoxaparin Injectable 40 milliGRAM(s) SubCutaneous daily  escitalopram 5 milliGRAM(s) Oral daily  fluticasone propionate 50 MICROgram(s)/spray Nasal Spray 1 Spray(s) Both Nostrils every 12 hours  glucagon  Injectable 1 milliGRAM(s) IntraMuscular once  insulin lispro (ADMELOG) corrective regimen sliding scale   SubCutaneous every 6 hours  lactobacillus acidophilus 1 Tablet(s) Oral daily  levothyroxine Injectable 12.5 MICROGram(s) IV Push <User Schedule>  loratadine 10 milliGRAM(s) Oral daily  mesalamine DR Capsule 800 milliGRAM(s) Oral every 12 hours  piperacillin/tazobactam IVPB.. 3.375 Gram(s) IV Intermittent every 8 hours  polyethylene glycol 3350 17 Gram(s) Oral daily    MEDICATIONS  (PRN):  ALBUTerol    90 MICROgram(s) HFA Inhaler 2 Puff(s) Inhalation every 6 hours PRN Shortness of Breath and/or Wheezing  aluminum hydroxide/magnesium hydroxide/simethicone Suspension 30 milliLiter(s) Oral every 4 hours PRN Dyspepsia  dextrose 50% Injectable 50 milliLiter(s) IV Push once PRN Glucose <70      LABS:                          11.4   12.67 )-----------( 453      ( 13 Dec 2021 09:33 )             34.8     12-13    140  |  104  |  9.5  ----------------------------<  246<H>  3.9   |  28.0  |  0.43<L>    Ca    7.8<L>      13 Dec 2021 09:33  Phos  2.5       Mg     2.0         TPro  4.5<L>  /  Alb  1.7<L>  /  TBili  0.4  /  DBili  0.1  /  AST  17  /  ALT  17  /  AlkPhos  100      PT/INR - ( 13 Dec 2021 10:24 )   PT: 13.2 sec;   INR: 1.15 ratio           Urinalysis Basic - ( 12 Dec 2021 05:44 )    Color: Yellow / Appearance: Clear / S.020 / pH: x  Gluc: x / Ketone: Negative  / Bili: Negative / Urobili: Negative mg/dL   Blood: x / Protein: 30 mg/dL / Nitrite: Negative   Leuk Esterase: Negative / RBC: 0-2 /HPF / WBC 0-2   Sq Epi: x / Non Sq Epi: Few / Bacteria: Few    Culture - Body Fluid with Gram Stain (21 @ 20:51)    Gram Stain:   polymorphonuclear leukocytes seen  No organisms seen  by cytocentrifuge    Specimen Source: Abdominal Fl RUQ Abdominal fluid    Culture - Body Fluid with Gram Stain (21 @ 20:48)    Gram Stain:   polymorphonuclear leukocytes seen  No organisms seen  by cytocentrifuge    Specimen Source: Abdominal Fl RLQ Abdominal Fluid    I&O's Detail    12 Dec 2021 07:01  -  13 Dec 2021 07:00  --------------------------------------------------------  IN:    dextrose 5% + sodium chloride 0.9% w/ Additives: 1000 mL    IV PiggyBack: 100 mL  Total IN: 1100 mL    OUT:    Colostomy (mL): 200 mL    Drain (mL): 1450 mL    Drain (mL): 1250 mL    Indwelling Catheter - Urethral (mL): 1050 mL    Nasogastric/Oral tube (mL): 450 mL    Oral Fluid: 0 mL    Vital1.5: 0 mL  Total OUT: 4400 mL    Total NET: -3300 mL      13 Dec 2021 07:01  -  13 Dec 2021 13:26  --------------------------------------------------------  IN:  Total IN: 0 mL    OUT:    Drain (mL): 350 mL  Total OUT: 350 mL    Total NET: -350 mL              RADIOLOGY & ADDITIONAL TESTS:  < from: CT Abdomen and Pelvis w/ IV Cont (21 @ 16:58) >    ACC: [id number] EXAM:  CT ABDOMEN AND PELVIS IC                        ACC: [id number] EXAM:  CT CHEST IC                          PROCEDURE DATE:  2021      < end of copied text >  < from: CT Abdomen and Pelvis w/ IV Cont (21 @ 16:58) >    IMPRESSION:  *  Worsening of large pneumoperitoneum.      --- End of Report ---    < end of copied text >    < from: CT Chest No Cont (12.10.21 @ 11:45) >    ACC: [id number] EXAM:  CT CHEST                          PROCEDURE DATE:  12/10/2021      < end of copied text >  < from: CT Chest No Cont (12.10.21 @ 11:45) >  IMPRESSION:    Compressive atelectasis within basilar left lower lobe is unchanged from   2021. Otherwise, no new opacity.    Small pleural effusions.    In comparison with 2021, decreased pneumoperitoneum. Increased   ascites.    --- End of Report ---    < end of copied text >        REVIEW OF SYSTEMS:    UTO patient is non verbal     Vital Signs Last 24 Hrs  T(C): 36.4 (13 Dec 2021 10:19), Max: 36.6 (12 Dec 2021 16:15)  T(F): 97.5 (13 Dec 2021 10:19), Max: 97.8 (12 Dec 2021 16:15)  HR: 81 (13 Dec 2021 10:19) (81 - 100)  BP: 104/68 (13 Dec 2021 10:19) (100/56 - 104/68)  BP(mean): --  RR: 18 (13 Dec 2021 10:19) (18 - 18)  SpO2: 93% (13 Dec 2021 10:19) (91% - 96%)  PHYSICAL EXAM:    GENERAL: NAD, well-groomed, well-developed,   non verbal ,   HEAD:  Atraumatic, Normocephalic  EYES: EOMI, PERRLA, conjunctiva and sclera clear  NECK: Supple, No JVD, Normal thyroid  NERVOUS SYSTEM: Awake , non verbal , unable to assess   CHEST/LUNG: CTA b/l ,  no  rales, rhonchi, wheezing, or rubs  HEART: Regular rate and rhythm; No murmurs, rubs, or gallops  ABDOMEN: Soft, Nontender, Nondistended; Bowel sounds present  no rebound tenderness / guarding; ostomy stoma pink and patent,   fecal fluid material in bag, midline incision site dry. Drains in RUQ and RLQ draining serous fluid.     EXTREMITIES:  2+ Peripheral Pulses, No clubbing, cyanosis, or edema  LYMPH: No lymphadenopathy noted  SKIN: No rashes or lesions  Schuster cath +   NGT +      no concerns

## 2022-03-18 ENCOUNTER — EMERGENCY (EMERGENCY)
Facility: HOSPITAL | Age: 77
LOS: 1 days | Discharge: HOME | End: 2022-03-18
Attending: STUDENT IN AN ORGANIZED HEALTH CARE EDUCATION/TRAINING PROGRAM
Payer: COMMERCIAL

## 2022-03-18 VITALS
HEART RATE: 58 BPM | DIASTOLIC BLOOD PRESSURE: 59 MMHG | HEIGHT: 64 IN | TEMPERATURE: 98 F | RESPIRATION RATE: 18 BRPM | SYSTOLIC BLOOD PRESSURE: 76 MMHG | OXYGEN SATURATION: 95 %

## 2022-03-18 VITALS
HEART RATE: 97 BPM | DIASTOLIC BLOOD PRESSURE: 75 MMHG | SYSTOLIC BLOOD PRESSURE: 108 MMHG | TEMPERATURE: 98 F | RESPIRATION RATE: 17 BRPM | OXYGEN SATURATION: 97 %

## 2022-03-18 DIAGNOSIS — I25.10 ATHEROSCLEROTIC HEART DISEASE OF NATIVE CORONARY ARTERY WITHOUT ANGINA PECTORIS: Chronic | ICD-10-CM

## 2022-03-18 DIAGNOSIS — Z95.0 PRESENCE OF CARDIAC PACEMAKER: Chronic | ICD-10-CM

## 2022-03-18 DIAGNOSIS — Z90.49 ACQUIRED ABSENCE OF OTHER SPECIFIED PARTS OF DIGESTIVE TRACT: Chronic | ICD-10-CM

## 2022-03-18 LAB
ALBUMIN SERPL ELPH-MCNC: 3.6 G/DL — SIGNIFICANT CHANGE UP (ref 3.5–5)
ALP SERPL-CCNC: 89 U/L — SIGNIFICANT CHANGE UP (ref 40–120)
ALT FLD-CCNC: 18 U/L DA — SIGNIFICANT CHANGE UP (ref 10–60)
ANION GAP SERPL CALC-SCNC: 13 MMOL/L — SIGNIFICANT CHANGE UP (ref 5–17)
AST SERPL-CCNC: 22 U/L — SIGNIFICANT CHANGE UP (ref 10–40)
BASOPHILS # BLD AUTO: 0.04 K/UL — SIGNIFICANT CHANGE UP (ref 0–0.2)
BASOPHILS NFR BLD AUTO: 0.7 % — SIGNIFICANT CHANGE UP (ref 0–2)
BILIRUB SERPL-MCNC: 2.1 MG/DL — HIGH (ref 0.2–1.2)
BUN SERPL-MCNC: 21 MG/DL — HIGH (ref 7–18)
CALCIUM SERPL-MCNC: 9.8 MG/DL — SIGNIFICANT CHANGE UP (ref 8.4–10.5)
CHLORIDE SERPL-SCNC: 111 MMOL/L — HIGH (ref 96–108)
CO2 SERPL-SCNC: 24 MMOL/L — SIGNIFICANT CHANGE UP (ref 22–31)
CREAT SERPL-MCNC: 1.55 MG/DL — HIGH (ref 0.5–1.3)
EGFR: 46 ML/MIN/1.73M2 — LOW
EOSINOPHIL # BLD AUTO: 0.05 K/UL — SIGNIFICANT CHANGE UP (ref 0–0.5)
EOSINOPHIL NFR BLD AUTO: 0.9 % — SIGNIFICANT CHANGE UP (ref 0–6)
GLUCOSE SERPL-MCNC: 119 MG/DL — HIGH (ref 70–99)
HCT VFR BLD CALC: 48.7 % — SIGNIFICANT CHANGE UP (ref 39–50)
HGB BLD-MCNC: 16.4 G/DL — SIGNIFICANT CHANGE UP (ref 13–17)
IMM GRANULOCYTES NFR BLD AUTO: 0.5 % — SIGNIFICANT CHANGE UP (ref 0–1.5)
INR BLD: 1.14 RATIO — SIGNIFICANT CHANGE UP (ref 0.88–1.16)
LYMPHOCYTES # BLD AUTO: 1.14 K/UL — SIGNIFICANT CHANGE UP (ref 1–3.3)
LYMPHOCYTES # BLD AUTO: 20 % — SIGNIFICANT CHANGE UP (ref 13–44)
MCHC RBC-ENTMCNC: 29.7 PG — SIGNIFICANT CHANGE UP (ref 27–34)
MCHC RBC-ENTMCNC: 33.7 GM/DL — SIGNIFICANT CHANGE UP (ref 32–36)
MCV RBC AUTO: 88.1 FL — SIGNIFICANT CHANGE UP (ref 80–100)
MONOCYTES # BLD AUTO: 0.45 K/UL — SIGNIFICANT CHANGE UP (ref 0–0.9)
MONOCYTES NFR BLD AUTO: 7.9 % — SIGNIFICANT CHANGE UP (ref 2–14)
NEUTROPHILS # BLD AUTO: 4 K/UL — SIGNIFICANT CHANGE UP (ref 1.8–7.4)
NEUTROPHILS NFR BLD AUTO: 70 % — SIGNIFICANT CHANGE UP (ref 43–77)
NRBC # BLD: 0 /100 WBCS — SIGNIFICANT CHANGE UP (ref 0–0)
PLATELET # BLD AUTO: 114 K/UL — LOW (ref 150–400)
POTASSIUM SERPL-MCNC: 4.2 MMOL/L — SIGNIFICANT CHANGE UP (ref 3.5–5.3)
POTASSIUM SERPL-SCNC: 4.2 MMOL/L — SIGNIFICANT CHANGE UP (ref 3.5–5.3)
PROT SERPL-MCNC: 7.2 G/DL — SIGNIFICANT CHANGE UP (ref 6–8.3)
PROTHROM AB SERPL-ACNC: 13.6 SEC — HIGH (ref 10.5–13.4)
RBC # BLD: 5.53 M/UL — SIGNIFICANT CHANGE UP (ref 4.2–5.8)
RBC # FLD: 13.1 % — SIGNIFICANT CHANGE UP (ref 10.3–14.5)
SARS-COV-2 RNA SPEC QL NAA+PROBE: SIGNIFICANT CHANGE UP
SODIUM SERPL-SCNC: 148 MMOL/L — HIGH (ref 135–145)
TROPONIN I, HIGH SENSITIVITY RESULT: 15.7 NG/L — SIGNIFICANT CHANGE UP
WBC # BLD: 5.71 K/UL — SIGNIFICANT CHANGE UP (ref 3.8–10.5)
WBC # FLD AUTO: 5.71 K/UL — SIGNIFICANT CHANGE UP (ref 3.8–10.5)

## 2022-03-18 PROCEDURE — 71045 X-RAY EXAM CHEST 1 VIEW: CPT

## 2022-03-18 PROCEDURE — 70498 CT ANGIOGRAPHY NECK: CPT | Mod: 26,MA

## 2022-03-18 PROCEDURE — 99285 EMERGENCY DEPT VISIT HI MDM: CPT

## 2022-03-18 PROCEDURE — 87635 SARS-COV-2 COVID-19 AMP PRB: CPT

## 2022-03-18 PROCEDURE — 80053 COMPREHEN METABOLIC PANEL: CPT

## 2022-03-18 PROCEDURE — 70498 CT ANGIOGRAPHY NECK: CPT | Mod: MA

## 2022-03-18 PROCEDURE — 85610 PROTHROMBIN TIME: CPT

## 2022-03-18 PROCEDURE — 93010 ELECTROCARDIOGRAM REPORT: CPT

## 2022-03-18 PROCEDURE — 93005 ELECTROCARDIOGRAM TRACING: CPT

## 2022-03-18 PROCEDURE — 82962 GLUCOSE BLOOD TEST: CPT

## 2022-03-18 PROCEDURE — 70496 CT ANGIOGRAPHY HEAD: CPT | Mod: MA

## 2022-03-18 PROCEDURE — 99285 EMERGENCY DEPT VISIT HI MDM: CPT | Mod: 25

## 2022-03-18 PROCEDURE — 36415 COLL VENOUS BLD VENIPUNCTURE: CPT

## 2022-03-18 PROCEDURE — 85025 COMPLETE CBC W/AUTO DIFF WBC: CPT

## 2022-03-18 PROCEDURE — 84484 ASSAY OF TROPONIN QUANT: CPT

## 2022-03-18 PROCEDURE — 71045 X-RAY EXAM CHEST 1 VIEW: CPT | Mod: 26

## 2022-03-18 PROCEDURE — 70496 CT ANGIOGRAPHY HEAD: CPT | Mod: 26,MA

## 2022-03-18 RX ORDER — SODIUM CHLORIDE 9 MG/ML
1000 INJECTION INTRAMUSCULAR; INTRAVENOUS; SUBCUTANEOUS ONCE
Refills: 0 | Status: COMPLETED | OUTPATIENT
Start: 2022-03-18 | End: 2022-03-18

## 2022-03-18 RX ADMIN — SODIUM CHLORIDE 1000 MILLILITER(S): 9 INJECTION INTRAMUSCULAR; INTRAVENOUS; SUBCUTANEOUS at 07:18

## 2022-03-18 NOTE — ED PROVIDER NOTE - PHYSICAL EXAMINATION
General: well appearing male, no acute distress   HEENT: normocephalic, atraumatic, EOMI  Respiratory: normal work of breathing, lungs clear to auscultation bilaterally   Cardiac: regular rate and rhythm   Abdomen: soft, non-tender, no guarding or rebound   MSK: no swelling or tenderness of lower extremities, moving all extremities spontaneously   Skin: warm, dry   Neuro: A&Ox3, cranial nerves II-XII intact, 5/5 strength in all extremities, no sensory deficits, normal finger to nose   Psych: appropriate affect

## 2022-03-18 NOTE — ED PROVIDER NOTE - OBJECTIVE STATEMENT
76M, pmh of htn, DM, CVA (no residual deficits) presenting with slurred speech. patient reports he woke this morning and went to the prayer house for morning prayer when he suddenly became dizzy. wife reports he had slurred speech. patient unsure if he lost consciousness. no recent fever, chest pain, abdominal pain, nausea, vomiting, pain or swelling of lower extremities. sometimes feels short of breath. symptoms have now improved.

## 2022-03-18 NOTE — ED PROVIDER NOTE - PROGRESS NOTE DETAILS
patient and family updated on results. remains asymptomatic. stable for discharge. Armando Rodriguez

## 2022-03-18 NOTE — ED PROVIDER NOTE - NSICDXFAMILYHX_GEN_ALL_CORE_FT
FAMILY HISTORY:  Sibling  Still living? Yes, Estimated age: Age Unknown  Family history of coronary artery disease, Age at diagnosis: Age Unknown

## 2022-03-18 NOTE — ED PROVIDER NOTE - PATIENT PORTAL LINK FT
You can access the FollowMyHealth Patient Portal offered by [facility] by registering at the following website: [email or web address]. By joining [employer]’s FollowMyHealth portal, you will also be able to view your health information using other applications (apps) compatible with our system.

## 2022-03-18 NOTE — ED ADULT NURSE NOTE - NS ED NURSE RECORD ANOTHER HT AND WT
Left message for pt to call back regarding where to send PT order and reports.           ----- Message from Lanny Licona sent at 1/17/2020  8:15 AM CST -----  Contact: Patient  Patient was calling to see if his report from his last surgery was received, and [facility].Please call the patient if there are any questions at  [phone].     Yes

## 2022-03-18 NOTE — ED PROVIDER NOTE - NSICDXPASTMEDICALHX_GEN_ALL_CORE_FT
PAST MEDICAL HISTORY:  BPH (benign prostatic hypertrophy)     Coronary artery disease     CVA (cerebral vascular accident) 10-15 years ago -- No residual    Difficult intubation patient has notation with pacemaker information that he is a difficult intubation but no specifics given    Hyperlipidemia     Hypertension     Sinoatrial node dysfunction guidant pacemaker  [employer] I Entra SSIRO IS-1, Comp, 1198, [id number]  leads Bipolar 3.2 LP Passive RV 52, 59 cm, 4260, [id number]  implanted oon 11/21/05 by Dr. Clark at [facility]    Snoring SWATI precautions -- responds affirmatively to STOP BANG questionnaire -- admits to intermittent snoring; age > 50; gender, male; h/o htn    Vasovagal syncope

## 2022-03-18 NOTE — ED PROVIDER NOTE - NSFOLLOWUPINSTRUCTIONS_ED_ALL_ED_FT
You were seen in the emergency department for dizziness and slurred speech.     Please follow-up with your primary care doctor in the next 24-48 hours.     Please follow-up with your neurologist within the next week.     If you have any worsening symptoms, severe headache, dizziness, changes in vision, difficulty speak or walking, or weakness on one side of your body, please return to the emergency department.

## 2022-03-18 NOTE — ED PROVIDER NOTE - CLINICAL SUMMARY MEDICAL DECISION MAKING FREE TEXT BOX
76M presenting with dizziness and slurred speech. symptoms now resolved. non-focal neuro exam. possible TIA. patient on aspirin, plavix, statin. medically optimized for CVA protection. will get labs, CT head/neck and reassess.

## 2022-03-18 NOTE — ED PROVIDER NOTE - NSICDXPASTSURGICALHX_GEN_ALL_CORE_FT
PAST SURGICAL HISTORY:  Coronary artery disease CABG x 3 in 1994 in [place]    Pacemaker see medical for information on pacemaker    S/P cholecystectomy

## 2022-03-18 NOTE — ED ADULT TRIAGE NOTE - CHIEF COMPLAINT QUOTE
He has slurred speech started 45 minutes ago, resolved at this time He has slurred speech started 45 minutes ago, resolved at this time, low oxygen level

## 2022-08-15 ENCOUNTER — APPOINTMENT (OUTPATIENT)
Dept: ELECTROPHYSIOLOGY | Facility: CLINIC | Age: 77
End: 2022-08-15

## 2022-08-15 VITALS — HEART RATE: 50 BPM | SYSTOLIC BLOOD PRESSURE: 117 MMHG | DIASTOLIC BLOOD PRESSURE: 80 MMHG | RESPIRATION RATE: 14 BRPM

## 2022-08-15 PROCEDURE — 93279 PRGRMG DEV EVAL PM/LDLS PM: CPT

## 2022-08-15 RX ORDER — METOPROLOL TARTRATE 25 MG/1
25 TABLET, FILM COATED ORAL
Refills: 0 | Status: ACTIVE | COMMUNITY

## 2022-08-15 RX ORDER — METOPROLOL SUCCINATE 25 MG/1
25 TABLET, EXTENDED RELEASE ORAL DAILY
Refills: 0 | Status: DISCONTINUED | COMMUNITY
End: 2022-08-15

## 2022-08-15 RX ORDER — TICAGRELOR 60 MG/1
60 TABLET ORAL
Refills: 0 | Status: DISCONTINUED | COMMUNITY
End: 2022-08-15

## 2022-10-18 ENCOUNTER — INPATIENT (INPATIENT)
Facility: HOSPITAL | Age: 77
LOS: 3 days | Discharge: HOME | DRG: 871 | End: 2022-10-22
Attending: INTERNAL MEDICINE | Admitting: INTERNAL MEDICINE
Payer: COMMERCIAL

## 2022-10-18 VITALS
WEIGHT: 121.25 LBS | SYSTOLIC BLOOD PRESSURE: 135 MMHG | RESPIRATION RATE: 20 BRPM | HEART RATE: 108 BPM | HEIGHT: 64 IN | DIASTOLIC BLOOD PRESSURE: 88 MMHG | OXYGEN SATURATION: 95 % | TEMPERATURE: 98 F

## 2022-10-18 DIAGNOSIS — Z95.0 PRESENCE OF CARDIAC PACEMAKER: ICD-10-CM

## 2022-10-18 DIAGNOSIS — I25.10 ATHEROSCLEROTIC HEART DISEASE OF NATIVE CORONARY ARTERY WITHOUT ANGINA PECTORIS: ICD-10-CM

## 2022-10-18 DIAGNOSIS — N40.0 BENIGN PROSTATIC HYPERPLASIA WITHOUT LOWER URINARY TRACT SYMPTOMS: ICD-10-CM

## 2022-10-18 DIAGNOSIS — R06.02 SHORTNESS OF BREATH: ICD-10-CM

## 2022-10-18 DIAGNOSIS — I25.10 ATHEROSCLEROTIC HEART DISEASE OF NATIVE CORONARY ARTERY WITHOUT ANGINA PECTORIS: Chronic | ICD-10-CM

## 2022-10-18 DIAGNOSIS — I10 ESSENTIAL (PRIMARY) HYPERTENSION: ICD-10-CM

## 2022-10-18 DIAGNOSIS — Z29.9 ENCOUNTER FOR PROPHYLACTIC MEASURES, UNSPECIFIED: ICD-10-CM

## 2022-10-18 DIAGNOSIS — Z95.0 PRESENCE OF CARDIAC PACEMAKER: Chronic | ICD-10-CM

## 2022-10-18 DIAGNOSIS — Z90.49 ACQUIRED ABSENCE OF OTHER SPECIFIED PARTS OF DIGESTIVE TRACT: Chronic | ICD-10-CM

## 2022-10-18 DIAGNOSIS — U07.1 COVID-19: ICD-10-CM

## 2022-10-18 LAB
ALBUMIN SERPL ELPH-MCNC: 3.5 G/DL — SIGNIFICANT CHANGE UP (ref 3.5–5)
ALP SERPL-CCNC: 80 U/L — SIGNIFICANT CHANGE UP (ref 40–120)
ALT FLD-CCNC: 19 U/L DA — SIGNIFICANT CHANGE UP (ref 10–60)
ANION GAP SERPL CALC-SCNC: 12 MMOL/L — SIGNIFICANT CHANGE UP (ref 5–17)
ANION GAP SERPL CALC-SCNC: 9 MMOL/L — SIGNIFICANT CHANGE UP (ref 5–17)
AST SERPL-CCNC: 18 U/L — SIGNIFICANT CHANGE UP (ref 10–40)
BASOPHILS # BLD AUTO: 0.04 K/UL — SIGNIFICANT CHANGE UP (ref 0–0.2)
BASOPHILS # BLD AUTO: 0.05 K/UL — SIGNIFICANT CHANGE UP (ref 0–0.2)
BASOPHILS NFR BLD AUTO: 0.4 % — SIGNIFICANT CHANGE UP (ref 0–2)
BASOPHILS NFR BLD AUTO: 0.6 % — SIGNIFICANT CHANGE UP (ref 0–2)
BILIRUB SERPL-MCNC: 2.2 MG/DL — HIGH (ref 0.2–1.2)
BUN SERPL-MCNC: 20 MG/DL — HIGH (ref 7–18)
BUN SERPL-MCNC: SIGNIFICANT CHANGE UP MG/DL (ref 7–18)
CALCIUM SERPL-MCNC: 9.5 MG/DL — SIGNIFICANT CHANGE UP (ref 8.4–10.5)
CALCIUM SERPL-MCNC: SIGNIFICANT CHANGE UP MG/DL (ref 8.4–10.5)
CHLORIDE SERPL-SCNC: 105 MMOL/L — SIGNIFICANT CHANGE UP (ref 96–108)
CHLORIDE SERPL-SCNC: 107 MMOL/L — SIGNIFICANT CHANGE UP (ref 96–108)
CO2 SERPL-SCNC: 17 MMOL/L — LOW (ref 22–31)
CO2 SERPL-SCNC: 23 MMOL/L — SIGNIFICANT CHANGE UP (ref 22–31)
CREAT SERPL-MCNC: 1.28 MG/DL — SIGNIFICANT CHANGE UP (ref 0.5–1.3)
CREAT SERPL-MCNC: 1.34 MG/DL — HIGH (ref 0.5–1.3)
D DIMER BLD IA.RAPID-MCNC: 174 NG/ML DDU — SIGNIFICANT CHANGE UP
EGFR: 55 ML/MIN/1.73M2 — LOW
EGFR: 58 ML/MIN/1.73M2 — LOW
EOSINOPHIL # BLD AUTO: 0 K/UL — SIGNIFICANT CHANGE UP (ref 0–0.5)
EOSINOPHIL # BLD AUTO: 0.06 K/UL — SIGNIFICANT CHANGE UP (ref 0–0.5)
EOSINOPHIL NFR BLD AUTO: 0 % — SIGNIFICANT CHANGE UP (ref 0–6)
EOSINOPHIL NFR BLD AUTO: 0.8 % — SIGNIFICANT CHANGE UP (ref 0–6)
GLUCOSE SERPL-MCNC: 102 MG/DL — HIGH (ref 70–99)
GLUCOSE SERPL-MCNC: 89 MG/DL — SIGNIFICANT CHANGE UP (ref 70–99)
HCT VFR BLD CALC: 47.8 % — SIGNIFICANT CHANGE UP (ref 39–50)
HCT VFR BLD CALC: 55.7 % — HIGH (ref 39–50)
HGB BLD-MCNC: 15.7 G/DL — SIGNIFICANT CHANGE UP (ref 13–17)
HGB BLD-MCNC: 17.9 G/DL — HIGH (ref 13–17)
IMM GRANULOCYTES NFR BLD AUTO: 0.4 % — SIGNIFICANT CHANGE UP (ref 0–0.9)
IMM GRANULOCYTES NFR BLD AUTO: 0.4 % — SIGNIFICANT CHANGE UP (ref 0–0.9)
LYMPHOCYTES # BLD AUTO: 0.51 K/UL — LOW (ref 1–3.3)
LYMPHOCYTES # BLD AUTO: 0.66 K/UL — LOW (ref 1–3.3)
LYMPHOCYTES # BLD AUTO: 6.4 % — LOW (ref 13–44)
LYMPHOCYTES # BLD AUTO: 7.1 % — LOW (ref 13–44)
MAGNESIUM SERPL-MCNC: 2.2 MG/DL — SIGNIFICANT CHANGE UP (ref 1.6–2.6)
MCHC RBC-ENTMCNC: 29.3 PG — SIGNIFICANT CHANGE UP (ref 27–34)
MCHC RBC-ENTMCNC: 29.6 PG — SIGNIFICANT CHANGE UP (ref 27–34)
MCHC RBC-ENTMCNC: 32.1 GM/DL — SIGNIFICANT CHANGE UP (ref 32–36)
MCHC RBC-ENTMCNC: 32.8 GM/DL — SIGNIFICANT CHANGE UP (ref 32–36)
MCV RBC AUTO: 90.2 FL — SIGNIFICANT CHANGE UP (ref 80–100)
MCV RBC AUTO: 91.2 FL — SIGNIFICANT CHANGE UP (ref 80–100)
MONOCYTES # BLD AUTO: 0.64 K/UL — SIGNIFICANT CHANGE UP (ref 0–0.9)
MONOCYTES # BLD AUTO: 0.68 K/UL — SIGNIFICANT CHANGE UP (ref 0–0.9)
MONOCYTES NFR BLD AUTO: 6.9 % — SIGNIFICANT CHANGE UP (ref 2–14)
MONOCYTES NFR BLD AUTO: 8.6 % — SIGNIFICANT CHANGE UP (ref 2–14)
NEUTROPHILS # BLD AUTO: 6.62 K/UL — SIGNIFICANT CHANGE UP (ref 1.8–7.4)
NEUTROPHILS # BLD AUTO: 7.86 K/UL — HIGH (ref 1.8–7.4)
NEUTROPHILS NFR BLD AUTO: 83.2 % — HIGH (ref 43–77)
NEUTROPHILS NFR BLD AUTO: 85.2 % — HIGH (ref 43–77)
NRBC # BLD: 0 /100 WBCS — SIGNIFICANT CHANGE UP (ref 0–0)
NRBC # BLD: 0 /100 WBCS — SIGNIFICANT CHANGE UP (ref 0–0)
NT-PROBNP SERPL-SCNC: 2551 PG/ML — HIGH (ref 0–450)
PHOSPHATE SERPL-MCNC: 3.6 MG/DL — SIGNIFICANT CHANGE UP (ref 2.5–4.5)
PLATELET # BLD AUTO: 117 K/UL — LOW (ref 150–400)
PLATELET # BLD AUTO: 126 K/UL — LOW (ref 150–400)
POTASSIUM SERPL-MCNC: 4.5 MMOL/L — SIGNIFICANT CHANGE UP (ref 3.5–5.3)
POTASSIUM SERPL-MCNC: 5.5 MMOL/L — HIGH (ref 3.5–5.3)
POTASSIUM SERPL-SCNC: 4.5 MMOL/L — SIGNIFICANT CHANGE UP (ref 3.5–5.3)
POTASSIUM SERPL-SCNC: 5.5 MMOL/L — HIGH (ref 3.5–5.3)
PROT SERPL-MCNC: 7.3 G/DL — SIGNIFICANT CHANGE UP (ref 6–8.3)
RAPID RVP RESULT: DETECTED
RBC # BLD: 5.3 M/UL — SIGNIFICANT CHANGE UP (ref 4.2–5.8)
RBC # BLD: 6.11 M/UL — HIGH (ref 4.2–5.8)
RBC # FLD: 13.7 % — SIGNIFICANT CHANGE UP (ref 10.3–14.5)
RBC # FLD: 13.9 % — SIGNIFICANT CHANGE UP (ref 10.3–14.5)
SARS-COV-2 RNA SPEC QL NAA+PROBE: DETECTED
SODIUM SERPL-SCNC: 134 MMOL/L — LOW (ref 135–145)
SODIUM SERPL-SCNC: 139 MMOL/L — SIGNIFICANT CHANGE UP (ref 135–145)
TROPONIN I, HIGH SENSITIVITY RESULT: 10.9 NG/L — SIGNIFICANT CHANGE UP
WBC # BLD: 7.95 K/UL — SIGNIFICANT CHANGE UP (ref 3.8–10.5)
WBC # BLD: 9.24 K/UL — SIGNIFICANT CHANGE UP (ref 3.8–10.5)
WBC # FLD AUTO: 7.95 K/UL — SIGNIFICANT CHANGE UP (ref 3.8–10.5)
WBC # FLD AUTO: 9.24 K/UL — SIGNIFICANT CHANGE UP (ref 3.8–10.5)

## 2022-10-18 PROCEDURE — 71046 X-RAY EXAM CHEST 2 VIEWS: CPT | Mod: 26

## 2022-10-18 PROCEDURE — 99285 EMERGENCY DEPT VISIT HI MDM: CPT

## 2022-10-18 RX ORDER — FUROSEMIDE 40 MG
20 TABLET ORAL ONCE
Refills: 0 | Status: COMPLETED | OUTPATIENT
Start: 2022-10-18 | End: 2022-10-18

## 2022-10-18 RX ORDER — ONDANSETRON 8 MG/1
4 TABLET, FILM COATED ORAL EVERY 8 HOURS
Refills: 0 | Status: DISCONTINUED | OUTPATIENT
Start: 2022-10-18 | End: 2022-10-22

## 2022-10-18 RX ORDER — ACETAMINOPHEN 500 MG
650 TABLET ORAL EVERY 6 HOURS
Refills: 0 | Status: DISCONTINUED | OUTPATIENT
Start: 2022-10-18 | End: 2022-10-22

## 2022-10-18 RX ORDER — ASPIRIN/CALCIUM CARB/MAGNESIUM 324 MG
81 TABLET ORAL DAILY
Refills: 0 | Status: DISCONTINUED | OUTPATIENT
Start: 2022-10-18 | End: 2022-10-22

## 2022-10-18 RX ORDER — METOPROLOL TARTRATE 50 MG
12.5 TABLET ORAL
Refills: 0 | Status: DISCONTINUED | OUTPATIENT
Start: 2022-10-18 | End: 2022-10-22

## 2022-10-18 RX ORDER — TAMSULOSIN HYDROCHLORIDE 0.4 MG/1
0.8 CAPSULE ORAL AT BEDTIME
Refills: 0 | Status: DISCONTINUED | OUTPATIENT
Start: 2022-10-18 | End: 2022-10-22

## 2022-10-18 RX ORDER — LOSARTAN POTASSIUM 100 MG/1
50 TABLET, FILM COATED ORAL DAILY
Refills: 0 | Status: DISCONTINUED | OUTPATIENT
Start: 2022-10-18 | End: 2022-10-22

## 2022-10-18 RX ORDER — ENOXAPARIN SODIUM 100 MG/ML
40 INJECTION SUBCUTANEOUS EVERY 24 HOURS
Refills: 0 | Status: DISCONTINUED | OUTPATIENT
Start: 2022-10-18 | End: 2022-10-19

## 2022-10-18 RX ORDER — LANOLIN ALCOHOL/MO/W.PET/CERES
3 CREAM (GRAM) TOPICAL AT BEDTIME
Refills: 0 | Status: DISCONTINUED | OUTPATIENT
Start: 2022-10-18 | End: 2022-10-22

## 2022-10-18 RX ORDER — ATORVASTATIN CALCIUM 80 MG/1
80 TABLET, FILM COATED ORAL AT BEDTIME
Refills: 0 | Status: DISCONTINUED | OUTPATIENT
Start: 2022-10-18 | End: 2022-10-22

## 2022-10-18 RX ADMIN — ATORVASTATIN CALCIUM 80 MILLIGRAM(S): 80 TABLET, FILM COATED ORAL at 22:12

## 2022-10-18 RX ADMIN — Medication 20 MILLIGRAM(S): at 15:05

## 2022-10-18 RX ADMIN — Medication 12.5 MILLIGRAM(S): at 17:32

## 2022-10-18 RX ADMIN — ENOXAPARIN SODIUM 40 MILLIGRAM(S): 100 INJECTION SUBCUTANEOUS at 17:32

## 2022-10-18 RX ADMIN — TAMSULOSIN HYDROCHLORIDE 0.8 MILLIGRAM(S): 0.4 CAPSULE ORAL at 22:12

## 2022-10-18 NOTE — H&P ADULT - NSHPPHYSICALEXAM_GEN_ALL_CORE
LOS:     VITALS:   T(C): 36.8 (10-18-22 @ 11:55), Max: 36.8 (10-18-22 @ 11:55)  HR: 108 (10-18-22 @ 11:55) (108 - 108)  BP: 135/88 (10-18-22 @ 11:55) (135/88 - 135/88)  RR: 20 (10-18-22 @ 11:55) (20 - 20)  SpO2: 95% (10-18-22 @ 11:55) (95% - 95%)    GENERAL: NAD, lying in bed comfortably  HEAD:  Atraumatic, Normocephalic  EYES: EOMI, PERRLA, conjunctiva and sclera clear  ENT: Moist mucous membranes  NECK: Supple, No JVD  CHEST/LUNG: Clear to auscultation bilaterally; No rales, rhonchi, wheezing, or rubs. Unlabored respirations  HEART: Regular rate and rhythm; No murmurs, rubs, or gallops  ABDOMEN: BSx4; Soft, nontender, nondistended  EXTREMITIES:  2+ Peripheral Pulses, brisk capillary refill. No clubbing, cyanosis, or edema  NERVOUS SYSTEM:  A&Ox3, no focal deficits   SKIN: No rashes or lesions VITALS:   T(C): 36.8 (10-18-22 @ 11:55), Max: 36.8 (10-18-22 @ 11:55)  HR: 108 (10-18-22 @ 11:55) (108 - 108)  BP: 135/88 (10-18-22 @ 11:55) (135/88 - 135/88)  RR: 20 (10-18-22 @ 11:55) (20 - 20)  SpO2: 95% (10-18-22 @ 11:55) (95% - 95%)    GENERAL: NAD, thin elderly male  HEAD:  Atraumatic, Normocephalic  EYES: EOMI, PERRLA, conjunctiva and sclera clear  ENT: Moist mucous membranes  NECK: Supple, No JVD  CHEST/LUNG: + expiratory mild rales bibasilar lung fields, + dry cough. No rhonchi, wheezing, or rubs. Unlabored respirations  HEART: Regular rate and rhythm; No murmurs, rubs, or gallops  ABDOMEN: BSx4; Soft, nontender, nondistended  EXTREMITIES:  2+ Peripheral Pulses, brisk capillary refill. No clubbing, cyanosis, or edema  NERVOUS SYSTEM:  A&Ox3, no focal deficits   SKIN: No rashes or lesions VITALS:   T(C): 36.8 (10-18-22 @ 11:55), Max: 36.8 (10-18-22 @ 11:55)  HR: 108 (10-18-22 @ 11:55) (108 - 108)  BP: 135/88 (10-18-22 @ 11:55) (135/88 - 135/88)  RR: 20 (10-18-22 @ 11:55) (20 - 20)  SpO2: 95% (10-18-22 @ 11:55) (95% - 95%)    GENERAL: NAD, thin elderly male  HEAD:  Atraumatic, Normocephalic  EYES: EOMI, PERRLA, conjunctiva and sclera clear  ENT: Moist mucous membranes  NECK: Supple, No JVD  CHEST/LUNG: + expiratory mild rales bibasilar lung fields, + dry cough, + left sided PPM. No rhonchi, wheezing, or rubs. Unlabored respirations  HEART: Regular rate and rhythm; No murmurs, rubs, or gallops  ABDOMEN: BSx4; Soft, nontender, nondistended  EXTREMITIES:  2+ Peripheral Pulses, brisk capillary refill. No clubbing, cyanosis, or edema  NERVOUS SYSTEM:  A&Ox3, no focal deficits   SKIN: No rashes or lesions

## 2022-10-18 NOTE — H&P ADULT - CONVERSATION DETAILS
An extensive goals of care conversation was held including options, risks and benefits of many medical treatments including CPR, intubation, and tube feeding. Discussion was held with patient and daughter. Pt states that he would like his daughter to make all medical decisions. Daughter states that she would like all medical interventions done, including cardiopulmonary resuscitation and intubation.

## 2022-10-18 NOTE — H&P ADULT - ASSESSMENT
77 year old male, from home, PMH of HTN, CVA (no residual deficits), CAD (s/p stents), PPM, hx of MI (s/p CABG), presents to the ED with shortness of breath and cough for the past 2 days. Admitted to medicine for shortness of breath 2/2 new onset CHF vs. PNA. 77 year old male, from home, PMH of HTN, CVA (no residual deficits), CAD (s/p stents), PPM, hx of MI (s/p CABG), presents to the ED with shortness of breath and cough for the past 2 days. Admitted to medicine for shortness of breath 2/2 COVID PNA.

## 2022-10-18 NOTE — H&P ADULT - NSICDXPASTMEDICALHX_GEN_ALL_CORE_FT
PAST MEDICAL HISTORY:  BPH (benign prostatic hypertrophy)     Coronary artery disease stents    CVA (cerebral vascular accident) 10-15 years ago -- No residual    Difficult intubation patient has notation with pacemaker information that he is a difficult intubation but no specifics given    Hyperlipidemia     Hypertension     Sinoatrial node dysfunction guidant pacemaker  [employer] I Entra SSIRO IS-1, Comp, 1198, [id number]  leads Bipolar 3.2 LP Passive RV 52, 59 cm, 4260, [id number]  implanted oon 11/21/05 by Dr. Clark at [facility]    Snoring SWATI precautions -- responds affirmatively to STOP BANG questionnaire -- admits to intermittent snoring; age > 50; gender, male; h/o htn    Vasovagal syncope

## 2022-10-18 NOTE — H&P ADULT - PROBLEM SELECTOR PLAN 2
- hx of CAD (s/p stents), on aspirin and statin  - follow with outpatient cardiologist Dr. Casarez and PCP, Dr. Kohli  - c/w [place] meds, - hx of HTN, on metoprolol and losartan  - c/w home meds with parameters  - monitor BP

## 2022-10-18 NOTE — H&P ADULT - PROBLEM SELECTOR PLAN 3
- hx of PPM placed 11/21/05 by Dr. Clark at [facility]  -  [facility] - hx of CAD (s/p stents), on aspirin and statin  - follow with outpatient cardiologist Dr. Casarez and PCP, Dr. Kohli  - c/w [place] meds, - hx of CAD (s/p stents), on aspirin and statin  - follow with outpatient cardiologist Dr. Casarez and PCP, Dr. Kohli  - c/w [facility]

## 2022-10-18 NOTE — ED PROVIDER NOTE - CLINICAL SUMMARY MEDICAL DECISION MAKING FREE TEXT BOX
77-year-old male hx of HTN, DM, CVA (no residual deficits), pacemaker, CAD w/stents, presenting with SOB/CP/cough for the past 2 days - possibly due to infection vs ACS vs PE. will check labs, CXR, ECG, patient gets very short of breath when sitting up and moving in bed, will likely require admission.

## 2022-10-18 NOTE — ED ADULT NURSE NOTE - SUICIDE SCREENING QUESTION 1
[address] [facility]  RECORD OF OPERATION     PATIENT NAME: Stuart Hernandez               MEDICAL RECORD NO. [id number]                DATE OF PROCEDURE: 7/19/2020  SURGEON: Duncan Reyes MD  PRIMARY CARE PHYSICIAN: Christy Warren MD        PREOPERATIVE DIAGNOSIS: right VJ stone     POSTOPERATIVE DIAGNOSIS: same      PROCEDURE PERFORMED: cystoscopy with right ureteroscopy,stone basket retrieval and placement of right ureteral stent with fluoroscopic guidance. SURGEON: Duncan Reyes MD    ASSISTANT(S): none     ANESTHESIA: general     BLOOD LOSS:  minimal  ml. SPECIMENS: Right ureteral stone fragments for analysis    DRAINS: VL 6Fr ureteral stent     COMPLICATIONS:  None immediately appreciated. DISCUSSION:  Dwight Olivares is a 61y.o.-year-old female who has a diagnosis of right UVJ stone, right perinephric stranding and fluid collection, recurrent ED visits. After a history and physical examination was performed, potential diagnostic and therapeutic modalities were discussed with the patient. Above mentioned procedure was recommended and a discussion of the risks, possible complications, possible side effects, along with the anticipated benefits were reviewed. She was given the opportunity to ask questions. Once answered, informed consent was obtained. She was brought to the operating on 7/19/2020 for this procedure. PROCEDURE: Dwight Olivares was taken to the OR and transferred to the operating room table. After initiation of general anesthesia the patient was placed in lithotomy position for cystoscopy. Her groin was prepped and draped in the standard fashion for cystoscopy. A 22 [place] cystoscope was passed per urethra and the right ureteral orifice was seen. A dual-flex wire was passed through the orifice and up to the kidney under fluoroscopic guidance. The wire was left indwelling and the scope was removed.     A semi-rigid ureteroscope was then passed up to the level of the stone and the stone was directly visualized. The stone was grasped and gently removed using a 3 Fr [employer]. Following the removal of the stones the wire was used to pass a VL 6 [place] ureteral stent. A good coil was visualized in the renal pelvis and also in the bladder via fluoroscopy as the stent was placed. The distal coil was also visualized directly via the cystoscope as the bladder was drained. Prince Jones was taken out of lithotomy position after being cleaned and dried. She was transferred to the PACU in stable condition. She tolerated the procedure well, there were no complications. Suggest to observe the patient overnight. If doing well, may discharge tomorrow and follow up in 2 weeks with a KUB and renal ultrasound for possible stent removal. After surgery I phoned and talked to her daughter from [facility] ([phone]). No

## 2022-10-18 NOTE — H&P ADULT - PROBLEM SELECTOR PLAN 1
- hx of HTN, on metoprolol and losartan  - c/w home meds with parameters  - monitor BP - p/w SOB, cough and weakness for the past 2 days  - + orthopnea and dyspnea on exertion  - last echo on file in 2018 was grossly normal  - f/u echo  - f/u RVP - p/w SOB, cough and weakness for the past 2 days  - + orthopnea and dyspnea on exertion  - last echo on file in 2018 was grossly normal  - EKG unchanged from March 2022  - BNP 2551, trop negative, d-dimer negative  - ddx new onset CHF vs. PNA  - f/u echocardiogram  - f/u RVP - p/w SOB, cough and weakness for the past 2 days  - + orthopnea and dyspnea on exertion  - last echo on file in 2018 was grossly normal  - EKG unchanged from March 2022  - Found to be COVID +   - BNP 2551, trop negative, d-dimer negative  - saturating well on room air  - Maintain O2 92-95%, O2 support titrate as needed  - Obtain inflammatory markers (d-dimer, procal, LDH, ferritin, ESR, CRP) q2-3 days - p/w SOB, cough and weakness for the past 2 days  - + orthopnea and dyspnea on exertion  - last echo on file in 2018 was grossly normal  - EKG unchanged from March 2022  - Found to be COVID +   - BNP 2551, trop negative, d-dimer negative  - saturating well on room air  - Maintain O2 92-95%, O2 support titrate as needed  - Obtain inflammatory markers (d-dimer, procal, LDH, ferritin, ESR, CRP) q2-3 days  - f/u echocardiogram  - ID, Dr. Ireland consulted

## 2022-10-18 NOTE — ED ADULT NURSE NOTE - NSFALLRSKASSESSDT_ED_ALL_ED
"[facility] OUTPATIENT FOLLOW UP CLINIC VISIT    REASON FOR FOLLOW-UP:    AL amyloidosis  Therapy with michael-CyBorD initiated 8/16/2022    HISTORY OF PRESENT ILLNESS: Cynthia Flower is a 67 y.o. female with the above-mentioned history who is here today for lab review and evaluation due for cycle 2-day 22 Velcade and Cytoxan.  Patient was seen last week at [facility] and underwent a cardiac cath.  Dr. Sal discussed with  at [facility] due to persistent orthostasis plan to proceed with a decrease in the Velcade today at 1.0 mg/m².    Patient states that she was placed on midodrine which has been helping with her blood pressure.  She states that she does feel a little less dizzy/lightheaded.  She notes 1 episode of blood when wiping, but this has not reoccurred.  She did have some issues with vomiting and dumping after heart cath, but that has now settled down.      REVIEW OF SYSTEMS:  As per the HPI    PHYSICAL EXAMINATION:    Vitals:    10/18/22 1029   BP: 111/68   Pulse: 65   Resp: 18   Temp: 97.7 °F (36.5 °C)   TempSrc: Temporal   SpO2: 96%   Weight: 55.8 kg (123 lb 1.6 oz)   Height: 154.9 cm (60.98\")   PainSc: 0-No pain      Physical Exam  Vitals reviewed.   Constitutional:       General: She is not in acute distress.     Appearance: Normal appearance. She is well-developed.   HENT:      Head: Normocephalic and atraumatic.      Mouth/Throat:      Pharynx: No oropharyngeal exudate.   Eyes:      Pupils: Pupils are equal, round, and reactive to light.   Cardiovascular:      Rate and Rhythm: Normal rate and regular rhythm.      Heart sounds: Murmur heard.   Pulmonary:      Effort: Pulmonary effort is normal. No respiratory distress.      Breath sounds: Normal breath sounds. No wheezing, rhonchi or rales.   Abdominal:      General: Bowel sounds are normal. There is no distension.      Palpations: Abdomen is soft.   Musculoskeletal:         General: Normal range of motion.      Cervical " back: Normal range of motion.   Skin:     General: Skin is warm and dry.      Findings: No rash.   Neurological:      Mental Status: She is alert and oriented to person, place, and time.       DIAGNOSTIC DATA:  CBC and Differential (10/18/2022 10:01)  Comprehensive Metabolic Panel (10/18/2022 10:01)        IMAGING:    Cardiac MRI at [facility] 9/9/2022  1.  Small left ventricular chamber size and normal systolic function (LVEF 64%). Moderate concentric hypertrophy.   2.  Small right ventricular chamber size and normal systolic function (RVEF 53%).   3.  Systolic anterior motion of the subvalvular mitral apparatus with mild-moderate MR.   4.  Mild TR.   5.  There is diffuse subendocardial LGE with T1/ECV elevation in a pattern consistent with cardiac amyloidosis.   6.  No prior study for comparison.       ASSESSMENT:  This is a 67 y.o. female with:    *AL amyloidosis  · She had a stress echocardiogram on 4/24/2020 showing a normal left ventricular ejection fraction of 60% with moderate concentric hypertrophy but no wall motion abnormalities of the left ventricle.  There was impaired relaxation noted.  She complained of chest pain during the examination.  There was some ST segment depression.  Cardiac catheterization was performed on the same day.  No intervention was required.  · Follow-up echocardiogram on 4/15/2021 showed a left ventricular ejection fraction of 61 to 65% with normal LV cavity size.  Left ventricular wall thickness showed moderate concentric hypertrophy.  Diastolic function was impaired.  No pericardial effusion.  Small left pleural effusion.  · She had follow-up PYP imaging for cardiac amyloidosis that was not suggestive of ATTR amyloidosis  · Laboratory values on 2/24/2022 showed a high serum free light chain lambda of 121, normal kappa of 10.5, low ratio at 0.09.  Serum protein electrophoresis showed no evidence of an M spike.  Urine light chains were abnormal with an elevated lambda light chain  of 33 and a low ratio of 0.48 with a 12.7 mg M spike on 24-hour urine protein electrophoresis.  · BNP was elevated at 2306 on 3/4/2022.  The troponin was normal at 0.03.  CK was 212 with a CK-MB of 10.87.  · Initial consult on 3/30/22. No M spike on serum protein electrophoresis. SIFE 'presence of monoclonal protein is unclear.'   · Bone marrow biopsy 4/13/22 normocellular, 12.1% plasma cells consistent with low volume plasma cell dyscrasia. FISH with low level t(11;14) fusion only. No amyloid noted as Congo red staining negative.   · Fat pad biopsy 5/11/22 positive for amyloid, AL lambda  · Referred to Dr. Buenrostro at [facility]. Intended to enroll on a clinical study but her troponin as tested by the study sponsor was not high enough  · Therapy with sissy-CyBorD initiated 8/16/2022  · Patient seen in the office on 8/17/2022 for triage visit.  Patient with complaints of fatigue, dizziness and diarrhea.  She was mildly hypotensive.  She was given IV fluids.  · Subsequent admission at [facility] with shortness of breath and volume overload.  She was diuresed and treated with antibiotics.  · D8 therapy administered on 8/30  · Light chains normal at [facility] on 8/31/22 (lambda light chain 1.3, down from 12.44)  · 9/27/2022: Cycle 2-day 8 of therapy  · 10/4/2022 cycle 2-day 8 CyBorD.  Patient continues to push oral hydration.  She continues to have some neck stiffness but this has not worsened.  She will see [facility] next week for further cardiac work-up.  Glucose was low upon initial labs today however the patient was given something to eat by nursing prior to rechecking.  This was therefore rechecked prior to her leaving the office and was 87.  Patient reports she was feeling fine.  · 10/18/2020 to cycle 2-day 22 Sissy -CyBorD.  Velcade dose will be reduced to 1.0 mg/m² due to patient's persistent issues with orthostasis.    *Diarrhea  · She saw Dr. Hoyos with GI at [facility] on 9/7.  · Suspicion for  amyloid involvement of the GI tract  · Continue Lomotil and Imodium. Rifaximin prescribed by Dr. Hoyos.  She has completed this.  · Diarrhea waxes and wanes with what she eats and dairy products particularly worsen diarrhea  · 10/4/2022 stable symptoms.  · 10/18/2022 patient does report some issues with dumping following her heart cath but this has resolved.    *Elevated liver enzymes  · 8/17/2022: AST 87, , bilirubin normal at 0.4  · 10/4/2022 ALT 39 and AST 36, previously 48 and 36.    *Cardiomyopathy:  · Most recent echocardiogram on 8/17/2022 showed left ventricular wall thickness consistent with moderate to severe concentric hypertrophy along with left ventricular septal wall measuring 2.2 cm and posterior wall thickness at 1.9 cm likely due to amyloidosis.  LVEF 61 to 65%.  Grade 1 impaired relaxation.  · Now followed by Dr. Lyles at [facility] with concerns for pre-existing HCM and AL cardiac amyloid.   · Cardiac MRI results above.  Amyloidosis evident.  · Catheterization planned at [facility] on 10/11.  · Status postcardiac cath at [facility] placed on midodrine to help with orthostasis.    *Sore tongue: If she wants Magic mouthwash she will let us know.  She did not complain of this today.    *She does have some left neck pain without injury.  This is stable.  Her movement is not limited and she has no radiating pain.  She will trial heating pad and gentle stretching.    PLAN:    1. Proceed with cycle 2-day 22 Sissy-CyBorD today.  The Velcade dose will be reduced to 1.0 mg/m².  2. Patient receives IV Cytoxan as she does not like taking pills.  She continues on oral dexamethasone 40 mg given in the office.  3. Patient will continue midodrine as prescribed by [facility].  4. 1 week follow-up with Dr. Sal with repeat labs reevaluation due for cycle 2-day 1 Sissy-CyBorD.  5. No more IV fluid infusions and she will keep up with excess fluid losses with oral intake.  6. Follow-up with cardiology and  gastroenterology at [facility].  She is also going to see an esophageal motility specialist there as well.  Swallowing is better at this point.  7. If possible, hopeful for autologous stem cell transplant at [facility] after induction therapy following 6 cycles.  Depending on response after 3 cycles consideration of stem cell collection at that point.  To be determined.  8. Continue acyclovir and Bactrim for prophylaxis  9. We will try to treat through peripheral IVs as long as possible.  10. Patient can continue massage and heating pads for the left neck pain    High risk therapy requiring intensive monitoring    Sissy-CyBorD regimen     Daratumumab and hyaluronidase (Darzalex Faspro) as follows:  Cycles 1 & 2: 1800 mg SC once per day on days 1, 8, 15, 22  Cycles 3 to 6: 1800 mg SC once per day on days 1 & 15  Cycles 7 up to 24: 1800 mg SC once on day 1     Bortezomib (Velcade) as follows:  Cycles 1 to 6: 1.3 mg/m2 SC once per day on days 1, 8, 15, 22     Cyclophosphamide (Cytoxan) as follows:  Cycles 1 to 6: 300 mg/m2 (maximum dose of 500 mg) PO or IV once per day on days 1, 8, 15, 22    Plan IV Cytoxan as she has difficulty swallowing pills.     Dexamethasone (Decadron) as follows:  Cycles 1 to 6: 40 mg PO or IV once per day on days 1, 8, 15, 22 (see note)  28-day cycle for up to 24 cycles   01-Jan-2020 02:48

## 2022-10-18 NOTE — ED PROVIDER NOTE - OBJECTIVE STATEMENT
77-year-old male hx of HTN, DM, CVA (no residual deficits), pacemaker, CAD w/stents, presenting with SOB/CP/cough for the past 2 days. Has had subjective fevers as well. Feels short of breath when he walks and when he lies down. Very weak. Denies leg swelling, hx of blood clots, hx of malignancy, recent surgeries or prolonged immobility. No other symptoms. Nonsmoker, never smoked.

## 2022-10-18 NOTE — H&P ADULT - NSHPREVIEWOFSYSTEMS_GEN_ALL_CORE
REVIEW OF SYSTEMS:    CONSTITUTIONAL: No weakness, fevers or chills  EYES/ENT: No visual changes;  No vertigo or throat pain   NECK: No pain or stiffness  RESPIRATORY: No cough, wheezing, hemoptysis; No shortness of breath  CARDIOVASCULAR: No chest pain or palpitations  GASTROINTESTINAL: No abdominal or epigastric pain. No nausea, vomiting, or hematemesis; No diarrhea or constipation. No melena or hematochezia.  GENITOURINARY: No dysuria, frequency or hematuria  NEUROLOGICAL: No numbness or weakness  SKIN: No itching, rashes REVIEW OF SYSTEMS:    CONSTITUTIONAL: + weakness, fevers. No chills  EYES/ENT: No visual changes;  No vertigo or throat pain   NECK: No pain or stiffness  RESPIRATORY: No wheezing, hemoptysis; + shortness of breath, cough  CARDIOVASCULAR: + mild chest pain. No palpitations  GASTROINTESTINAL: + mild abdominal pain. No nausea, vomiting, or hematemesis; No diarrhea or constipation. No melena or hematochezia.  GENITOURINARY: No dysuria, frequency or hematuria  NEUROLOGICAL: No numbness or tingling  SKIN: No itching, rashes

## 2022-10-18 NOTE — H&P ADULT - HISTORY OF PRESENT ILLNESS
77 year old male, from home, PMH of HTN, CVA (no residual deficits), CAD (s/p stents), PPM, hx of MI (s/p CABG), presents to the ED with shortness of breath and cough for the past 2 days. Pt states that he feels short of breath when lying down at night and when walking to the bathroom. He uses 2-3 pillows to sleep at night. He states he has generalized weakness and subjective fevers. He had one episode of dizziness while sitting down. States he has mild chest pain, dry cough and mild abdominal pain. Denies sick contact or recent travel. Denies headache, sore throat, palpitations, diarrhea, constipation or dysuria.     In ED: vitals , /88, Temp 98.3F, 95% on RA 77 year old male, from home, PMH of HTN, CVA (no residual deficits), CAD (s/p stents), PPM, hx of MI (s/p CABG), presents to the ED with shortness of breath and cough for the past 2 days. Pt states that he feels short of breath when lying down at night and when walking to the bathroom. He uses 2-3 pillows to sleep at night. He states he has generalized weakness and subjective fevers. He had one episode of dizziness while sitting down. States he has mild chest pain, dry cough and mild abdominal pain. Denies sick contact or recent travel. Denies headache, sore throat, palpitations, diarrhea, constipation or dysuria.     In ED: vitals , /88, Temp 98.3F, 95% on RA, RR 22  EKG shows accelerated Junctional rhythm with retrograde conduction with frequent PVCs. Left axis deviation. Unchanged from previous EKG in March 2022. 77 year old male, from home, PMH of HTN, CVA (no residual deficits), CAD (s/p stents), PPM, hx of MI (s/p CABG), presents to the ED with shortness of breath and cough for the past 2 days. Pt states that he feels short of breath when lying down at night and when walking to the bathroom. He uses 2-3 pillows to sleep at night. He states he has generalized weakness and subjective fevers. He had one episode of dizziness while sitting down. States he has mild chest pain, dry cough and mild abdominal pain. Denies sick contact or recent travel. Denies headache, sore throat, palpitations, diarrhea, constipation, dysuria or leg swelling.    In ED: vitals , /88, Temp 98.3F, 95% on RA, RR 22  EKG shows accelerated Junctional rhythm with retrograde conduction with frequent PVCs. Left axis deviation. Unchanged from previous EKG in March 2022. 77 year old male, from home, PMH of HTN, CVA (no residual deficits), CAD (s/p stents), PPM, hx of MI (s/p CABG), presents to the ED with shortness of breath and cough for the past 2 days. Pt states that he feels short of breath when lying down at night and when walking to the bathroom. He uses 2-3 pillows to sleep at night. He states he has generalized weakness and subjective fevers. He had one episode of dizziness while sitting down. States he has mild chest pain, dry cough and mild abdominal pain. Denies sick contact or recent travel. Denies headache, sore throat, palpitations, diarrhea, constipation, dysuria or leg swelling.    In ED: vitals , /88, Temp 98.3F, 95% on RA, RR 22  EKG shows accelerated Junctional rhythm with retrograde conduction with frequent PVCs. Left axis deviation. Inferolateral infarct, age indeterminate. Unchanged from previous EKG in March 2022. 77 year old male, from home, PMH of HTN, CVA (no residual deficits), CAD (s/p stents), PPM, hx of MI (s/p CABG), presents to the ED with shortness of breath and cough for the past 2 days. Pt states that he feels short of breath when lying down at night and when walking to the bathroom. He uses 2-3 pillows to sleep at night. He states he has generalized weakness and subjective fevers. He had one episode of dizziness while sitting down. States he has mild chest pain, dry cough and mild abdominal pain. Denies sick contact or recent travel. Denies headache, sore throat, palpitations, diarrhea, constipation, dysuria or leg swelling.    In ED: vitals , /88, Temp 98.3F, 95% on RA, RR 22  EKG shows accelerated Junctional rhythm with retrograde conduction with frequent PVCs. Left axis deviation. Inferolateral infarct, age indeterminate. Unchanged from previous EKG in March 2022.  Found to be COVID +  77 year old male, from home, PMH of HTN, CVA (no residual deficits), CAD (s/p stents), PPM, hx of MI (s/p CABG), presents to the ED with shortness of breath and cough for the past 2 days. Pt states that he feels short of breath when lying down at night and when walking to the bathroom. He uses 2-3 pillows to sleep at night. He states he has generalized weakness and subjective fevers. He had one episode of dizziness while sitting down. States he has mild chest pain, dry cough and mild abdominal pain. Denies sick contact or recent travel. Denies headache, sore throat, palpitations, diarrhea, constipation, dysuria or leg swelling.    In ED: vitals , /88, Temp 98.3F, 95% on RA, RR 22  EKG shows accelerated Junctional rhythm with retrograde conduction with frequent PVCs. Left axis deviation. Inferolateral infarct, age indeterminate. Unchanged from previous EKG in March 2022.  s/p IV Lasix 20mg in ED  Found to be COVID +

## 2022-10-19 ENCOUNTER — TRANSCRIPTION ENCOUNTER (OUTPATIENT)
Age: 77
End: 2022-10-19

## 2022-10-19 DIAGNOSIS — A41.9 SEPSIS, UNSPECIFIED ORGANISM: ICD-10-CM

## 2022-10-19 DIAGNOSIS — N17.9 ACUTE KIDNEY FAILURE, UNSPECIFIED: ICD-10-CM

## 2022-10-19 DIAGNOSIS — I50.9 HEART FAILURE, UNSPECIFIED: ICD-10-CM

## 2022-10-19 LAB
ALBUMIN SERPL ELPH-MCNC: 3.4 G/DL — LOW (ref 3.5–5)
ALP SERPL-CCNC: 68 U/L — SIGNIFICANT CHANGE UP (ref 40–120)
ALT FLD-CCNC: 20 U/L DA — SIGNIFICANT CHANGE UP (ref 10–60)
ANION GAP SERPL CALC-SCNC: 8 MMOL/L — SIGNIFICANT CHANGE UP (ref 5–17)
AST SERPL-CCNC: 24 U/L — SIGNIFICANT CHANGE UP (ref 10–40)
BASOPHILS # BLD AUTO: 0.03 K/UL — SIGNIFICANT CHANGE UP (ref 0–0.2)
BASOPHILS NFR BLD AUTO: 0.6 % — SIGNIFICANT CHANGE UP (ref 0–2)
BILIRUB SERPL-MCNC: 2.8 MG/DL — HIGH (ref 0.2–1.2)
BUN SERPL-MCNC: 26 MG/DL — HIGH (ref 7–18)
CALCIUM SERPL-MCNC: 9.5 MG/DL — SIGNIFICANT CHANGE UP (ref 8.4–10.5)
CHLORIDE SERPL-SCNC: 104 MMOL/L — SIGNIFICANT CHANGE UP (ref 96–108)
CO2 SERPL-SCNC: 27 MMOL/L — SIGNIFICANT CHANGE UP (ref 22–31)
CREAT SERPL-MCNC: 1.5 MG/DL — HIGH (ref 0.5–1.3)
CRP SERPL-MCNC: 50 MG/L — HIGH
EGFR: 48 ML/MIN/1.73M2 — LOW
EOSINOPHIL # BLD AUTO: 0 K/UL — SIGNIFICANT CHANGE UP (ref 0–0.5)
EOSINOPHIL NFR BLD AUTO: 0 % — SIGNIFICANT CHANGE UP (ref 0–6)
ERYTHROCYTE [SEDIMENTATION RATE] IN BLOOD: 4 MM/HR — SIGNIFICANT CHANGE UP (ref 0–20)
FERRITIN SERPL-MCNC: 144 NG/ML — SIGNIFICANT CHANGE UP (ref 30–400)
GLUCOSE SERPL-MCNC: 147 MG/DL — HIGH (ref 70–99)
HCT VFR BLD CALC: 50 % — SIGNIFICANT CHANGE UP (ref 39–50)
HCV AB S/CO SERPL IA: 0.12 S/CO — SIGNIFICANT CHANGE UP (ref 0–0.99)
HCV AB SERPL-IMP: SIGNIFICANT CHANGE UP
HGB BLD-MCNC: 16 G/DL — SIGNIFICANT CHANGE UP (ref 13–17)
IMM GRANULOCYTES NFR BLD AUTO: 0.2 % — SIGNIFICANT CHANGE UP (ref 0–0.9)
LDH SERPL L TO P-CCNC: 157 U/L — SIGNIFICANT CHANGE UP (ref 120–225)
LYMPHOCYTES # BLD AUTO: 0.77 K/UL — LOW (ref 1–3.3)
LYMPHOCYTES # BLD AUTO: 15.2 % — SIGNIFICANT CHANGE UP (ref 13–44)
MCHC RBC-ENTMCNC: 29.1 PG — SIGNIFICANT CHANGE UP (ref 27–34)
MCHC RBC-ENTMCNC: 32 GM/DL — SIGNIFICANT CHANGE UP (ref 32–36)
MCV RBC AUTO: 91.1 FL — SIGNIFICANT CHANGE UP (ref 80–100)
MONOCYTES # BLD AUTO: 0.8 K/UL — SIGNIFICANT CHANGE UP (ref 0–0.9)
MONOCYTES NFR BLD AUTO: 15.8 % — HIGH (ref 2–14)
NEUTROPHILS # BLD AUTO: 3.44 K/UL — SIGNIFICANT CHANGE UP (ref 1.8–7.4)
NEUTROPHILS NFR BLD AUTO: 68.2 % — SIGNIFICANT CHANGE UP (ref 43–77)
NRBC # BLD: 0 /100 WBCS — SIGNIFICANT CHANGE UP (ref 0–0)
PLATELET # BLD AUTO: 107 K/UL — LOW (ref 150–400)
POTASSIUM SERPL-MCNC: 4 MMOL/L — SIGNIFICANT CHANGE UP (ref 3.5–5.3)
POTASSIUM SERPL-SCNC: 4 MMOL/L — SIGNIFICANT CHANGE UP (ref 3.5–5.3)
PROCALCITONIN SERPL-MCNC: 0.16 NG/ML — HIGH (ref 0.02–0.1)
PROT SERPL-MCNC: 6.8 G/DL — SIGNIFICANT CHANGE UP (ref 6–8.3)
RBC # BLD: 5.49 M/UL — SIGNIFICANT CHANGE UP (ref 4.2–5.8)
RBC # FLD: 13.7 % — SIGNIFICANT CHANGE UP (ref 10.3–14.5)
SODIUM SERPL-SCNC: 139 MMOL/L — SIGNIFICANT CHANGE UP (ref 135–145)
WBC # BLD: 5.05 K/UL — SIGNIFICANT CHANGE UP (ref 3.8–10.5)
WBC # FLD AUTO: 5.05 K/UL — SIGNIFICANT CHANGE UP (ref 3.8–10.5)

## 2022-10-19 PROCEDURE — 99232 SBSQ HOSP IP/OBS MODERATE 35: CPT

## 2022-10-19 RX ORDER — REMDESIVIR 5 MG/ML
200 INJECTION INTRAVENOUS EVERY 24 HOURS
Refills: 0 | Status: DISCONTINUED | OUTPATIENT
Start: 2022-10-19 | End: 2022-10-19

## 2022-10-19 RX ORDER — HEPARIN SODIUM 5000 [USP'U]/ML
5000 INJECTION INTRAVENOUS; SUBCUTANEOUS EVERY 12 HOURS
Refills: 0 | Status: DISCONTINUED | OUTPATIENT
Start: 2022-10-19 | End: 2022-10-22

## 2022-10-19 RX ORDER — SODIUM CHLORIDE 9 MG/ML
1000 INJECTION INTRAMUSCULAR; INTRAVENOUS; SUBCUTANEOUS
Refills: 0 | Status: DISCONTINUED | OUTPATIENT
Start: 2022-10-19 | End: 2022-10-22

## 2022-10-19 RX ORDER — REMDESIVIR 5 MG/ML
INJECTION INTRAVENOUS
Refills: 0 | Status: DISCONTINUED | OUTPATIENT
Start: 2022-10-19 | End: 2022-10-19

## 2022-10-19 RX ORDER — DEXAMETHASONE 0.5 MG/5ML
6 ELIXIR ORAL DAILY
Refills: 0 | Status: DISCONTINUED | OUTPATIENT
Start: 2022-10-19 | End: 2022-10-22

## 2022-10-19 RX ADMIN — ATORVASTATIN CALCIUM 80 MILLIGRAM(S): 80 TABLET, FILM COATED ORAL at 22:24

## 2022-10-19 RX ADMIN — LOSARTAN POTASSIUM 50 MILLIGRAM(S): 100 TABLET, FILM COATED ORAL at 07:10

## 2022-10-19 RX ADMIN — Medication 12.5 MILLIGRAM(S): at 07:10

## 2022-10-19 RX ADMIN — TAMSULOSIN HYDROCHLORIDE 0.8 MILLIGRAM(S): 0.4 CAPSULE ORAL at 22:24

## 2022-10-19 RX ADMIN — SODIUM CHLORIDE 50 MILLILITER(S): 9 INJECTION INTRAMUSCULAR; INTRAVENOUS; SUBCUTANEOUS at 18:25

## 2022-10-19 RX ADMIN — ENOXAPARIN SODIUM 40 MILLIGRAM(S): 100 INJECTION SUBCUTANEOUS at 18:03

## 2022-10-19 RX ADMIN — Medication 81 MILLIGRAM(S): at 12:15

## 2022-10-19 RX ADMIN — Medication 12.5 MILLIGRAM(S): at 18:03

## 2022-10-19 NOTE — CONSULT NOTE ADULT - ASSESSMENT
COVID - 19 infection        Plan - Cont Decadron 6mgs iv q24hrs  if cr decreases then will start Remdesivir.

## 2022-10-19 NOTE — PROGRESS NOTE ADULT - PROBLEM SELECTOR PLAN 3
likely prerenal from dehydration/infection  creatinine climbed from 1.28 to 1.5 (baseline unknown)  started on NS at 50 mL/hr

## 2022-10-19 NOTE — CHART NOTE - NSCHARTNOTEFT_GEN_A_CORE
EVENT:     OBJECTIVE:  Vital Signs Last 24 Hrs  T(C): 36.8 (19 Oct 2022 20:50), Max: 37.2 (19 Oct 2022 04:20)  T(F): 98.3 (19 Oct 2022 20:50), Max: 99 (19 Oct 2022 04:20)  HR: 52 (19 Oct 2022 20:50) (50 - 79)  BP: 151/78 (19 Oct 2022 20:50) (112/68 - 151/78)  BP(mean): --  RR: 18 (19 Oct 2022 20:50) (18 - 22)  SpO2: 98% (19 Oct 2022 20:50) (95% - 99%)    Parameters below as of 19 Oct 2022 20:50  Patient On (Oxygen Delivery Method): nasal cannula  O2 Flow (L/min): 3      FOCUSED PHYSICAL EXAM:    LABS:                        16.0   5.05  )-----------( 107      ( 19 Oct 2022 10:30 )             50.0     10-19    139  |  104  |  26<H>  ----------------------------<  147<H>  4.0   |  27  |  1.50<H>    Ca    9.5      19 Oct 2022 10:30  Phos  3.6     10-18  Mg     2.2     10-18    TPro  6.8  /  Alb  3.4<L>  /  TBili  2.8<H>  /  DBili  x   /  AST  24  /  ALT  20  /  AlkPhos  68  10-19      EKG:   IMGAGING:    ASSESSMENT:  HPI:  77 year old male, from home, PMH of HTN, CVA (no residual deficits), CAD (s/p stents), PPM, hx of MI (s/p CABG), presents to the ED with shortness of breath and cough for the past 2 days. Pt states that he feels short of breath when lying down at night and when walking to the bathroom. He uses 2-3 pillows to sleep at night. He states he has generalized weakness and subjective fevers. He had one episode of dizziness while sitting down. States he has mild chest pain, dry cough and mild abdominal pain. Denies sick contact or recent travel. Denies headache, sore throat, palpitations, diarrhea, constipation, dysuria or leg swelling.    In ED: vitals , /88, Temp 98.3F, 95% on RA, RR 22  EKG shows accelerated Junctional rhythm with retrograde conduction with frequent PVCs. Left axis deviation. Inferolateral infarct, age indeterminate. Unchanged from previous EKG in March 2022.  s/p IV Lasix 20mg in ED  Found to be COVID +  (18 Oct 2022 14:56)      PLAN:     FOLLOW UP / RESULT: EVENT: Daughter Jie [phone] updated on Pts condition, facetime facilitated    BRIEF HPI: 77 year old male, from home, PMH of HTN, CVA (no residual deficits), CAD (s/p stents), PPM, hx of MI (s/p CABG), presents to the ED with shortness of breath and cough for the past 2 days. Admitted to medicine for sepsis with acute hypoxic respiratory failure 2/2 COVID PNA current SpO2 96% on 3L NC, and acute kidney injury.    OBJECTIVE:  Vital Signs Last 24 Hrs  T(C): 36.8 (19 Oct 2022 20:50), Max: 37.2 (19 Oct 2022 04:20)  T(F): 98.3 (19 Oct 2022 20:50), Max: 99 (19 Oct 2022 04:20)  HR: 52 (19 Oct 2022 20:50) (50 - 79)  BP: 151/78 (19 Oct 2022 20:50) (112/68 - 151/78)  BP(mean): --  RR: 18 (19 Oct 2022 20:50) (18 - 22)  SpO2: 98% (19 Oct 2022 20:50) (95% - 99%)    Parameters below as of 19 Oct 2022 20:50  Patient On (Oxygen Delivery Method): nasal cannula  O2 Flow (L/min): 3    FOCUSED PHYSICAL EXAM: see in pt assessment      LABS:                        16.0   5.05  )-----------( 107      ( 19 Oct 2022 10:30 )             50.0     10-19    139  |  104  |  26<H>  ----------------------------<  147<H>  4.0   |  27  |  1.50<H>    Ca    9.5      19 Oct 2022 10:30  Phos  3.6     10-18  Mg     2.2     10-18    TPro  6.8  /  Alb  3.4<L>  /  TBili  2.8<H>  /  DBili  x   /  AST  24  /  ALT  20  /  AlkPhos  68  10-19    PLAN:   1. Facetime visit facilitated with daughter  2. Plan of care discussed questions answered    FOLLOW UP / RESULT: Pulse oximetry trend EVENT: Daughter Jie [phone] updated on Pts condition, facetime facilitated    BRIEF HPI: 77 year old male, from home, PMH of HTN, CVA (no residual deficits), CAD (s/p stents), PPM, hx of MI (s/p CABG), presents to the ED with shortness of breath and cough for the past 2 days. Admitted to medicine for sepsis with acute hypoxic respiratory failure 2/2 COVID PNA current SpO2 96% on 3L NC, and acute kidney injury.    OBJECTIVE:  Vital Signs Last 24 Hrs  T(C): 36.8 (19 Oct 2022 20:50), Max: 37.2 (19 Oct 2022 04:20)  T(F): 98.3 (19 Oct 2022 20:50), Max: 99 (19 Oct 2022 04:20)  HR: 52 (19 Oct 2022 20:50) (50 - 79)  BP: 151/78 (19 Oct 2022 20:50) (112/68 - 151/78)  BP(mean): --  RR: 18 (19 Oct 2022 20:50) (18 - 22)  SpO2: 98% (19 Oct 2022 20:50) (95% - 99%)    Parameters below as of 19 Oct 2022 20:50  Patient On (Oxygen Delivery Method): nasal cannula  O2 Flow (L/min): 3    FOCUSED PHYSICAL EXAM: see ED assessment per policy      LABS:                        16.0   5.05  )-----------( 107      ( 19 Oct 2022 10:30 )             50.0     10-19    139  |  104  |  26<H>  ----------------------------<  147<H>  4.0   |  27  |  1.50<H>    Ca    9.5      19 Oct 2022 10:30  Phos  3.6     10-18  Mg     2.2     10-18    TPro  6.8  /  Alb  3.4<L>  /  TBili  2.8<H>  /  DBili  x   /  AST  24  /  ALT  20  /  AlkPhos  68  10-19    PLAN:   1. Facetime visit facilitated with daughter  2. Plan of care discussed questions answered    FOLLOW UP / RESULT: Pulse oximetry trend

## 2022-10-19 NOTE — PROGRESS NOTE ADULT - ASSESSMENT
77 year old male, from home, PMH of HTN, CVA (no residual deficits), CAD (s/p stents), PPM, hx of MI (s/p CABG), presents to the ED with shortness of breath and cough for the past 2 days. Admitted to medicine for shortness of breath 2/2 COVID PNA currently satting 96% on 3L NC.  77 year old male, from home, PMH of HTN, CVA (no residual deficits), CAD (s/p stents), PPM, hx of MI (s/p CABG), presents to the ED with shortness of breath and cough for the past 2 days. Admitted to medicine for sepsis with acute hypoxic respiratory failure 2/2 COVID PNA currently satting 96% on 3L NC, and acute kidney injury. 77 year old male, from home, PMH of HTN, CVA (no residual deficits), CAD (s/p stents), PPM, hx of MI (s/p CABG), presents to the ED with shortness of breath and cough for the past 2 days. Admitted to medicine for sepsis with acute hypoxic respiratory failure 2/2 COVID PNA current SpO2 96% on 3L NC, and acute kidney injury.

## 2022-10-19 NOTE — PROGRESS NOTE ADULT - ASSESSMENT
77 year old male, from home, PMH of HTN, CVA (no residual deficits), CAD (s/p stents), PPM, hx of MI (s/p CABG), presents to the ED with shortness of breath and cough for the past 2 days. Admitted to medicine for sepsis with acute hypoxic respiratory failure 2/2 COVID PNA current SpO2 96% on 3L NC, and acute kidney injury.

## 2022-10-19 NOTE — CONSULT NOTE ADULT - SUBJECTIVE AND OBJECTIVE BOX
HPI:  77 year old male, from home, PMH of HTN, CVA (no residual deficits), CAD (s/p stents), PPM, hx of MI (s/p CABG), presents to the ED with shortness of breath and cough for the past 2 days. Pt states that he feels short of breath when lying down at night and when walking to the bathroom. He uses 2-3 pillows to sleep at night. He states he has generalized weakness and subjective fevers. He had one episode of dizziness while sitting down. States he has mild chest pain, dry cough and mild abdominal pain. Denies sick contact or recent travel. Denies headache, sore throat, palpitations, diarrhea, constipation, dysuria or leg swelling.    In ED: vitals , /88, Temp 98.3F, 95% on RA, RR 22  EKG shows accelerated Junctional rhythm with retrograde conduction with frequent PVCs. Left axis deviation. Inferolateral infarct, age indeterminate. Unchanged from previous EKG in March 2022.  s/p IV Lasix 20mg in ED  Found to be COVID +  (18 Oct 2022 14:56)      PAST MEDICAL & SURGICAL HISTORY:  BPH (benign prostatic hypertrophy)      Coronary artery disease  stents      Hyperlipidemia      Hypertension      Sinoatrial node dysfunction  guidant pacemaker  [employer] I [employer]ra SSIRO IS-1, Comp, 1198, [id number]  leads Bipolar 3.2 LP Passive RV 52, 59 cm, [id number], [id number]  implanted oon 11/21/05 by Dr. Clark at [facility]      Vasovagal syncope      Difficult intubation  patient has notation with pacemaker information that he is a difficult intubation but no specifics given      Snoring  SWATI precautions -- responds affirmatively to STOP BANG questionnaire -- admits to intermittent snoring; age &gt; 50; gender, male; h/o htn      CVA (cerebral vascular accident)  10-15 years ago -- No residual      Coronary artery disease  CABG x 3 in 1994 in [place]      S/P cholecystectomy      Pacemaker  see medical for information on pacemaker          penicillin (Other)      Meds:  acetaminophen     Tablet .. 650 milliGRAM(s) Oral every 6 hours PRN  aluminum hydroxide/magnesium hydroxide/simethicone Suspension 30 milliLiter(s) Oral every 4 hours PRN  aspirin enteric coated 81 milliGRAM(s) Oral daily  atorvastatin 80 milliGRAM(s) Oral at bedtime  dexAMETHasone  Injectable 6 milliGRAM(s) IV Push daily  enoxaparin Injectable 40 milliGRAM(s) SubCutaneous every 24 hours  losartan 50 milliGRAM(s) Oral daily  melatonin 3 milliGRAM(s) Oral at bedtime PRN  metoprolol tartrate 12.5 milliGRAM(s) Oral two times a day  ondansetron Injectable 4 milliGRAM(s) IV Push every 8 hours PRN  sodium chloride 0.9%. 1000 milliLiter(s) IV Continuous <Continuous>  tamsulosin 0.8 milliGRAM(s) Oral at bedtime      SOCIAL HISTORY:  Smoker:  YES / NO        PACK YEARS:                         WHEN QUIT?  ETOH use:  YES / NO               FREQUENCY / QUANTITY:  Ilicit Drug use:  YES / NO  Occupation:  Assisted device use (Cane / Walker):  Live with:    FAMILY HISTORY:  Family history of coronary artery disease (Sibling)        VITALS:  Vital Signs Last 24 Hrs  T(C): 36.7 (19 Oct 2022 15:47), Max: 37.2 (19 Oct 2022 04:20)  T(F): 98 (19 Oct 2022 15:47), Max: 99 (19 Oct 2022 04:20)  HR: 59 (19 Oct 2022 15:47) (50 - 79)  BP: 139/82 (19 Oct 2022 15:47) (112/68 - 170/95)  BP(mean): --  RR: 20 (19 Oct 2022 15:47) (19 - 22)  SpO2: 99% (19 Oct 2022 15:47) (95% - 99%)    Parameters below as of 19 Oct 2022 15:47  Patient On (Oxygen Delivery Method): nasal cannula  O2 Flow (L/min): 3      LABS/DIAGNOSTIC TESTS:                          16.0   5.05  )-----------( 107      ( 19 Oct 2022 10:30 )             50.0     WBC Count: 5.05 K/uL (10-19 @ 10:30)  WBC Count: 7.95 K/uL (10-18 @ 22:46)  WBC Count: 9.24 K/uL (10-18 @ 12:45)      10-19    139  |  104  |  26<H>  ----------------------------<  147<H>  4.0   |  27  |  1.50<H>    Ca    9.5      19 Oct 2022 10:30  Phos  3.6     10-18  Mg     2.2     10-18    TPro  6.8  /  Alb  3.4<L>  /  TBili  2.8<H>  /  DBili  x   /  AST  24  /  ALT  20  /  AlkPhos  68  10-19          LIVER FUNCTIONS - ( 19 Oct 2022 10:30 )  Alb: 3.4 g/dL / Pro: 6.8 g/dL / ALK PHOS: 68 U/L / ALT: 20 U/L DA / AST: 24 U/L / GGT: x                 LACTATE:    ABG -     CULTURES:       RADIOLOGY:< from: Xray Chest 2 Views PA/Lat (10.18.22 @ 13:56) >  ACC: [id number] EXAM:  XR CHEST PA LAT 2V                          PROCEDURE DATE:  10/18/2022          INTERPRETATION:  INDICATION: Short of breath    Chest one view    COMPARISON: 3/18/2022    FINDINGS:  Heart/Vascular: Status post median sternotomy and CABG. Cardiomegaly.   Left subclavian approach single lead pacemaker. Unfolded aortic arch.  Pulmonary: No acute pulmonary disease. Status post right lower lobe   pleurodesis/calcified pleural plaque. Right apical pleural thickening.  Bones: No fracture.  Trachea midline    Impression:    No acute pulmonary disease or interval change.        --- End of Report ---             ALTON BLAIR DO; Attending Radiologist  This document has been electronically signed. Oct 19 2022  4:20PM    < end of copied text >        ROS  [  ] UNABLE TO ELICIT               HPI:  77 year old male, from home, PMH of HTN, CVA (no residual deficits), CAD (s/p stents), PPM, hx of MI (s/p CABG), presents to the ED with shortness of breath and cough for the past 2 days. Pt states that he feels short of breath when lying down at night and when walking to the bathroom. He uses 2-3 pillows to sleep at night. He states he has generalized weakness and subjective fevers. He had one episode of dizziness while sitting down. States he has mild chest pain, dry cough and mild abdominal pain. Denies sick contact or recent travel. Denies headache, sore throat, palpitations, diarrhea, constipation, dysuria or leg swelling.    In ED: vitals , /88, Temp 98.3F, 95% on RA, RR 22  EKG shows accelerated Junctional rhythm with retrograde conduction with frequent PVCs. Left axis deviation. Inferolateral infarct, age indeterminate. Unchanged from previous EKG in March 2022.  s/p IV Lasix 20mg in ED  Found to be COVID +  (18 Oct 2022 14:56)      History as above,         PAST MEDICAL & SURGICAL HISTORY:  BPH (benign prostatic hypertrophy)      Coronary artery disease  stents      Hyperlipidemia      Hypertension      Sinoatrial node dysfunction  guidant pacemaker  [employer] I [employer] SSIRO IS-1, Comp, 1198, [id number]  leads Bipolar 3.2 LP Passive RV 52, 59 cm, 4260, [id number]  implanted oon 11/21/05 by Dr. Clark at [facility]      Vasovagal syncope      Difficult intubation  patient has notation with pacemaker information that he is a difficult intubation but no specifics given      Snoring  SWATI precautions -- responds affirmatively to STOP BANG questionnaire -- admits to intermittent snoring; age &gt; 50; gender, male; h/o htn      CVA (cerebral vascular accident)  10-15 years ago -- No residual      Coronary artery disease  CABG x 3 in 1994 in [place]      S/P cholecystectomy      Pacemaker  see medical for information on pacemaker          penicillin (Other)      Meds:  acetaminophen     Tablet .. 650 milliGRAM(s) Oral every 6 hours PRN  aluminum hydroxide/magnesium hydroxide/simethicone Suspension 30 milliLiter(s) Oral every 4 hours PRN  aspirin enteric coated 81 milliGRAM(s) Oral daily  atorvastatin 80 milliGRAM(s) Oral at bedtime  dexAMETHasone  Injectable 6 milliGRAM(s) IV Push daily  enoxaparin Injectable 40 milliGRAM(s) SubCutaneous every 24 hours  losartan 50 milliGRAM(s) Oral daily  melatonin 3 milliGRAM(s) Oral at bedtime PRN  metoprolol tartrate 12.5 milliGRAM(s) Oral two times a day  ondansetron Injectable 4 milliGRAM(s) IV Push every 8 hours PRN  sodium chloride 0.9%. 1000 milliLiter(s) IV Continuous <Continuous>  tamsulosin 0.8 milliGRAM(s) Oral at bedtime      SOCIAL HISTORY:  Smoker:  YES / NO        PACK YEARS:                         WHEN QUIT?  ETOH use:  YES / NO               FREQUENCY / QUANTITY:  Ilicit Drug use:  YES / NO  Occupation:  Assisted device use (Cane / Walker):  Live with:    FAMILY HISTORY:  Family history of coronary artery disease (Sibling)        VITALS:  Vital Signs Last 24 Hrs  T(C): 36.7 (19 Oct 2022 15:47), Max: 37.2 (19 Oct 2022 04:20)  T(F): 98 (19 Oct 2022 15:47), Max: 99 (19 Oct 2022 04:20)  HR: 59 (19 Oct 2022 15:47) (50 - 79)  BP: 139/82 (19 Oct 2022 15:47) (112/68 - 170/95)  BP(mean): --  RR: 20 (19 Oct 2022 15:47) (19 - 22)  SpO2: 99% (19 Oct 2022 15:47) (95% - 99%)    Parameters below as of 19 Oct 2022 15:47  Patient On (Oxygen Delivery Method): nasal cannula  O2 Flow (L/min): 3      LABS/DIAGNOSTIC TESTS:                          16.0   5.05  )-----------( 107      ( 19 Oct 2022 10:30 )             50.0     WBC Count: 5.05 K/uL (10-19 @ 10:30)  WBC Count: 7.95 K/uL (10-18 @ 22:46)  WBC Count: 9.24 K/uL (10-18 @ 12:45)      10-19    139  |  104  |  26<H>  ----------------------------<  147<H>  4.0   |  27  |  1.50<H>    Ca    9.5      19 Oct 2022 10:30  Phos  3.6     10-18  Mg     2.2     10-18    TPro  6.8  /  Alb  3.4<L>  /  TBili  2.8<H>  /  DBili  x   /  AST  24  /  ALT  20  /  AlkPhos  68  10-19          LIVER FUNCTIONS - ( 19 Oct 2022 10:30 )  Alb: 3.4 g/dL / Pro: 6.8 g/dL / ALK PHOS: 68 U/L / ALT: 20 U/L DA / AST: 24 U/L / GGT: x                 LACTATE:    ABG -     CULTURES:       RADIOLOGY:< from: Xray Chest 2 Views PA/Lat (10.18.22 @ 13:56) >  ACC: [id number] EXAM:  XR CHEST PA LAT 2V                          PROCEDURE DATE:  10/18/2022          INTERPRETATION:  INDICATION: Short of breath    Chest one view    COMPARISON: 3/18/2022    FINDINGS:  Heart/Vascular: Status post median sternotomy and CABG. Cardiomegaly.   Left subclavian approach single lead pacemaker. Unfolded aortic arch.  Pulmonary: No acute pulmonary disease. Status post right lower lobe   pleurodesis/calcified pleural plaque. Right apical pleural thickening.  Bones: No fracture.  Trachea midline    Impression:    No acute pulmonary disease or interval change.        --- End of Report ---             ALTON BLAIR DO; Attending Radiologist  This document has been electronically signed. Oct 19 2022  4:20PM    < end of copied text >        ROS  [  ] UNABLE TO ELICIT               HPI:  77 year old male, from home, PMH of HTN, CVA (no residual deficits), CAD (s/p stents), PPM, hx of MI (s/p CABG), presents to the ED with shortness of breath and cough for the past 2 days. Pt states that he feels short of breath when lying down at night and when walking to the bathroom. He uses 2-3 pillows to sleep at night. He states he has generalized weakness and subjective fevers. He had one episode of dizziness while sitting down. States he has mild chest pain, dry cough and mild abdominal pain. Denies sick contact or recent travel. Denies headache, sore throat, palpitations, diarrhea, constipation, dysuria or leg swelling.    In ED: vitals , /88, Temp 98.3F, 95% on RA, RR 22  EKG shows accelerated Junctional rhythm with retrograde conduction with frequent PVCs. Left axis deviation. Inferolateral infarct, age indeterminate. Unchanged from previous EKG in March 2022.  s/p IV Lasix 20mg in ED  Found to be COVID +  (18 Oct 2022 14:56)      History as above, patient who presents with a dry cough , SOB, fevers , chills , weakness and dizziness for 2-3 days which got progressively worse, he was found to be COVID + here , he does not know whom he came in contact with to acquire it, he lives at home with his wife and children all of whom are asymptomatic. He has no infiltrates on his CXR. He has been started on Decadron but we have held off on Remdesivir as his creatinine is increasing. He has no fevers here and he was not hypoxic initially but then dropped and so placed on 3L / min via NC.        PAST MEDICAL & SURGICAL HISTORY:  BPH (benign prostatic hypertrophy)      Coronary artery disease  stents      Hyperlipidemia      Hypertension      Sinoatrial node dysfunction  guidant pacemaker  [employer] I [employer] SSIRO IS-1, Comp, 1198, [id number]  leads Bipolar 3.2 LP Passive RV 52, 59 cm, 4260, [id number]  implanted oon 11/21/05 by Dr. Clark at [facility]      Vasovagal syncope      Difficult intubation  patient has notation with pacemaker information that he is a difficult intubation but no specifics given      Snoring  SWATI precautions -- responds affirmatively to STOP BANG questionnaire -- admits to intermittent snoring; age &gt; 50; gender, male; h/o htn      CVA (cerebral vascular accident)  10-15 years ago -- No residual      Coronary artery disease  CABG x 3 in 1994 in [place]      S/P cholecystectomy      Pacemaker  see medical for information on pacemaker          penicillin (Other)      Meds:  acetaminophen     Tablet .. 650 milliGRAM(s) Oral every 6 hours PRN  aluminum hydroxide/magnesium hydroxide/simethicone Suspension 30 milliLiter(s) Oral every 4 hours PRN  aspirin enteric coated 81 milliGRAM(s) Oral daily  atorvastatin 80 milliGRAM(s) Oral at bedtime  dexAMETHasone  Injectable 6 milliGRAM(s) IV Push daily  enoxaparin Injectable 40 milliGRAM(s) SubCutaneous every 24 hours  losartan 50 milliGRAM(s) Oral daily  melatonin 3 milliGRAM(s) Oral at bedtime PRN  metoprolol tartrate 12.5 milliGRAM(s) Oral two times a day  ondansetron Injectable 4 milliGRAM(s) IV Push every 8 hours PRN  sodium chloride 0.9%. 1000 milliLiter(s) IV Continuous <Continuous>  tamsulosin 0.8 milliGRAM(s) Oral at bedtime      SOCIAL HISTORY:  Smoker:  no  ETOH use:  no      FAMILY HISTORY:  Family history of coronary artery disease (Sibling)        VITALS:  Vital Signs Last 24 Hrs  T(C): 36.7 (19 Oct 2022 15:47), Max: 37.2 (19 Oct 2022 04:20)  T(F): 98 (19 Oct 2022 15:47), Max: 99 (19 Oct 2022 04:20)  HR: 59 (19 Oct 2022 15:47) (50 - 79)  BP: 139/82 (19 Oct 2022 15:47) (112/68 - 170/95)  BP(mean): --  RR: 20 (19 Oct 2022 15:47) (19 - 22)  SpO2: 99% (19 Oct 2022 15:47) (95% - 99%)    Parameters below as of 19 Oct 2022 15:47  Patient On (Oxygen Delivery Method): nasal cannula  O2 Flow (L/min): 3      LABS/DIAGNOSTIC TESTS:                          16.0   5.05  )-----------( 107      ( 19 Oct 2022 10:30 )             50.0     WBC Count: 5.05 K/uL (10-19 @ 10:30)  WBC Count: 7.95 K/uL (10-18 @ 22:46)  WBC Count: 9.24 K/uL (10-18 @ 12:45)      10-19    139  |  104  |  26<H>  ----------------------------<  147<H>  4.0   |  27  |  1.50<H>    Ca    9.5      19 Oct 2022 10:30  Phos  3.6     10-18  Mg     2.2     10-18    TPro  6.8  /  Alb  3.4<L>  /  TBili  2.8<H>  /  DBili  x   /  AST  24  /  ALT  20  /  AlkPhos  68  10-19          LIVER FUNCTIONS - ( 19 Oct 2022 10:30 )  Alb: 3.4 g/dL / Pro: 6.8 g/dL / ALK PHOS: 68 U/L / ALT: 20 U/L DA / AST: 24 U/L / GGT: x                 LACTATE:    ABG -     CULTURES:       RADIOLOGY:< from: Xray Chest 2 Views PA/Lat (10.18.22 @ 13:56) >  ACC: [id number] EXAM:  XR CHEST PA LAT 2V                          PROCEDURE DATE:  10/18/2022          INTERPRETATION:  INDICATION: Short of breath    Chest one view    COMPARISON: 3/18/2022    FINDINGS:  Heart/Vascular: Status post median sternotomy and CABG. Cardiomegaly.   Left subclavian approach single lead pacemaker. Unfolded aortic arch.  Pulmonary: No acute pulmonary disease. Status post right lower lobe   pleurodesis/calcified pleural plaque. Right apical pleural thickening.  Bones: No fracture.  Trachea midline    Impression:    No acute pulmonary disease or interval change.        --- End of Report ---             ALTON BLAIR DO; Attending Radiologist  This document has been electronically signed. Oct 19 2022  4:20PM    < end of copied text >        ROS  [  ] UNABLE TO ELICIT

## 2022-10-19 NOTE — PROGRESS NOTE ADULT - PROBLEM SELECTOR PLAN 1
2/2 covid pneumonia  procalcitonin 0.16  CRP 50  dimer 174  cxr unremarkable   SpO2 96% on 3L NC  started on dexamethasone 6mg IVP daily   hold off on remdesivir for now in setting of shanta, creatinine 1.5  ID Dr. Ireland

## 2022-10-19 NOTE — PROGRESS NOTE ADULT - SUBJECTIVE AND OBJECTIVE BOX
PATIENT SEEN AND EXAMINED ON :- 10/19/22  DATE OF SERVICE:     10/19/22        Interim events noted,Labs ,Radiological studies and Cardiology tests reviewed .       HOSPITAL COURSE: HPI:  77 year old male, from home, PMH of HTN, CVA (no residual deficits), CAD (s/p stents), PPM, hx of MI (s/p CABG), presents to the ED with shortness of breath and cough for the past 2 days. Pt states that he feels short of breath when lying down at night and when walking to the bathroom. He uses 2-3 pillows to sleep at night. He states he has generalized weakness and subjective fevers. He had one episode of dizziness while sitting down. States he has mild chest pain, dry cough and mild abdominal pain. Denies sick contact or recent travel. Denies headache, sore throat, palpitations, diarrhea, constipation, dysuria or leg swelling.    In ED: vitals , /88, Temp 98.3F, 95% on RA, RR 22  EKG shows accelerated Junctional rhythm with retrograde conduction with frequent PVCs. Left axis deviation. Inferolateral infarct, age indeterminate. Unchanged from previous EKG in March 2022.  s/p IV Lasix 20mg in ED  Found to be COVID +  (18 Oct 2022 14:56)      INTERIM EVENTS:Patient seen at bedside ,interim events noted.      PMH -reviewed admission note, no change since admission  HEART FAILURE: Acute[ ]Chronic[ ] Systolic[ ] Diastolic[ ] Combined Systolic and Diastolic[ ]  CAD[ ] CABG[ ] PCI[ ]  DEVICES[ ] PPM[ ] ICD[ ] ILR[ ]  ATRIAL FIBRILLATION[ ] Paroxysmal[ ] Permanent[ ] CHADS2-[  ]  GINNY[ ] CKD1[ ] CKD2[ ] CKD3[ ] CKD4[ ] ESRD[ ]  COPD[ ] HTN[ ]   DM[ ] Type1[ ] Type 2[ ]   CVA[ ] Paresis[ ]    AMBULATION: Assisted[ ] Cane/walker[ ] Independent[ ]    MEDICATIONS  (STANDING):  aspirin enteric coated 81 milliGRAM(s) Oral daily  atorvastatin 80 milliGRAM(s) Oral at bedtime  dexAMETHasone  Injectable 6 milliGRAM(s) IV Push daily  heparin   Injectable 5000 Unit(s) SubCutaneous every 12 hours  losartan 50 milliGRAM(s) Oral daily  metoprolol tartrate 12.5 milliGRAM(s) Oral two times a day  sodium chloride 0.9%. 1000 milliLiter(s) (50 mL/Hr) IV Continuous <Continuous>  tamsulosin 0.8 milliGRAM(s) Oral at bedtime    MEDICATIONS  (PRN):  acetaminophen     Tablet .. 650 milliGRAM(s) Oral every 6 hours PRN Temp greater or equal to 38C (100.4F), Mild Pain (1 - 3)  aluminum hydroxide/magnesium hydroxide/simethicone Suspension 30 milliLiter(s) Oral every 4 hours PRN Dyspepsia  melatonin 3 milliGRAM(s) Oral at bedtime PRN Insomnia  ondansetron Injectable 4 milliGRAM(s) IV Push every 8 hours PRN Nausea and/or Vomiting            REVIEW OF SYSTEMS:  Constitutional: [ ] fever, [ ]weight loss,  [ ]fatigue [ ]weight gain  Eyes: [ ] visual changes  Respiratory: [ ]shortness of breath;  [ ] cough, [ ]wheezing, [ ]chills, [ ]hemoptysis  Cardiovascular: [ ] chest pain, [ ]palpitations, [ ]dizziness,  [ ]leg swelling[ ]orthopnea[ ]PND  Gastrointestinal: [ ] abdominal pain, [ ]nausea, [ ]vomiting,  [ ]diarrhea [ ]Constipation [ ]Melena  Genitourinary: [ ] dysuria, [ ] hematuria [ ]Delarosa  Neurologic: [ ] headaches [ ] tremors[ ]weakness [ ]Paralysis Right[ ] Left[ ]  Skin: [ ] itching, [ ]burning, [ ] rashes  Endocrine: [ ] heat or cold intolerance  Musculoskeletal: [ ] joint pain or swelling; [ ] muscle, back, or extremity pain  Psychiatric: [ ] depression, [ ]anxiety, [ ]mood swings, or [ ]difficulty sleeping  Hematologic: [ ] easy bruising, [ ] bleeding gums    [ ] All remaining systems negative except as per above.   [ ]Unable to obtain.  [x] No change in ROS since admission      Vital Signs Last 24 Hrs  T(C): 36.7 (19 Oct 2022 15:47), Max: 37.2 (19 Oct 2022 04:20)  T(F): 98 (19 Oct 2022 15:47), Max: 99 (19 Oct 2022 04:20)  HR: 59 (19 Oct 2022 15:47) (50 - 79)  BP: 139/82 (19 Oct 2022 15:47) (112/68 - 170/95)  BP(mean): --  RR: 20 (19 Oct 2022 15:47) (19 - 22)  SpO2: 99% (19 Oct 2022 15:47) (95% - 99%)    Parameters below as of 19 Oct 2022 15:47  Patient On (Oxygen Delivery Method): nasal cannula  O2 Flow (L/min): 3    I&O's Summary      PHYSICAL EXAM:  General: No acute distress BMI-  HEENT: EOMI, PERRL  Neck: Supple, [ ] JVD  Lungs: Equal air entry bilaterally; [ ] rales [ ] wheezing [ ] rhonchi  Heart: Regular rate and rhythm; [x ] murmur   2/6 [ x] systolic [ ] diastolic [ ] radiation[ ] rubs [ ]  gallops  Abdomen: Nontender, bowel sounds present  Extremities: No clubbing, cyanosis, [ ] edema [ ]Pulses  equal and intact  Nervous system:  Alert & Oriented X3, no focal deficits  Psychiatric: Normal affect  Skin: No rashes or lesions    LABS:  10-19    139  |  104  |  26<H>  ----------------------------<  147<H>  4.0   |  27  |  1.50<H>    Ca    9.5      19 Oct 2022 10:30  Phos  3.6     10-18  Mg     2.2     10-18    TPro  6.8  /  Alb  3.4<L>  /  TBili  2.8<H>  /  DBili  x   /  AST  24  /  ALT  20  /  AlkPhos  68  10-19    Creatinine Trend: 1.50<--, 1.34<--, 1.28<--                        16.0   5.05  )-----------( 107      ( 19 Oct 2022 10:30 )             50.0         Serum Pro-Brain Natriuretic Peptide: 2551 pg/mL (10-18-22 @ 12:45)

## 2022-10-19 NOTE — PROGRESS NOTE ADULT - PROBLEM SELECTOR PLAN 1
patient feels short of breath but reports improvement with O2  procalcitonin 0.16  CRP 50  SpO2 96% on 3L NC  started on dexamethasone 6mg IV daily  hold off on remdesivir for now as patient has shanta, creatinine 1.5 patient feels short of breath but reports improvement with O2  procalcitonin 0.16  CRP 50  SpO2 96% on 3L NC 2/2 covid pneumonia  procalcitonin 0.16  CRP 50  dimer 174  cxr unremarkable   SpO2 96% on 3L NC  started on dexamethasone 6mg IVP daily   hold off on remdesivir for now in setting of shanta, creatinine 1.5  ID Dr. Ireland

## 2022-10-19 NOTE — PROGRESS NOTE ADULT - PROBLEM SELECTOR PLAN 3
likely 2/2 heart failure   creatinine climbed from 1.28 to 1.5  started on NS at mL/hr considering BNP is elevated likely prerenal from dehydration/infection  creatinine climbed from 1.28 to 1.5 (baseline unknown)  started on NS at 50 mL/hr

## 2022-10-19 NOTE — PROGRESS NOTE ADULT - SUBJECTIVE AND OBJECTIVE BOX
NP Note discussed with  Primary Attending    Patient is a 77y old  Male who presents with a chief complaint of SOB (18 Oct 2022 14:56)      INTERVAL HPI/OVERNIGHT EVENTS: no new complaints    MEDICATIONS  (STANDING):  aspirin enteric coated 81 milliGRAM(s) Oral daily  atorvastatin 80 milliGRAM(s) Oral at bedtime  enoxaparin Injectable 40 milliGRAM(s) SubCutaneous every 24 hours  losartan 50 milliGRAM(s) Oral daily  metoprolol tartrate 12.5 milliGRAM(s) Oral two times a day  tamsulosin 0.8 milliGRAM(s) Oral at bedtime    MEDICATIONS  (PRN):  acetaminophen     Tablet .. 650 milliGRAM(s) Oral every 6 hours PRN Temp greater or equal to 38C (100.4F), Mild Pain (1 - 3)  aluminum hydroxide/magnesium hydroxide/simethicone Suspension 30 milliLiter(s) Oral every 4 hours PRN Dyspepsia  melatonin 3 milliGRAM(s) Oral at bedtime PRN Insomnia  ondansetron Injectable 4 milliGRAM(s) IV Push every 8 hours PRN Nausea and/or Vomiting      __________________________________________________  REVIEW OF SYSTEMS:    CONSTITUTIONAL: No fever,   EYES: no acute visual disturbances  NECK: No pain or stiffness  RESPIRATORY: No cough; No shortness of breath  CARDIOVASCULAR: No chest pain, no palpitations  GASTROINTESTINAL: No pain. No nausea or vomiting; No diarrhea   NEUROLOGICAL: No headache or numbness, no tremors  MUSCULOSKELETAL: No joint pain, no muscle pain  GENITOURINARY: no dysuria, no frequency, no hesitancy  PSYCHIATRY: no depression , no anxiety  ALL OTHER  ROS negative        Vital Signs Last 24 Hrs  T(C): 36.7 (19 Oct 2022 07:24), Max: 37.2 (19 Oct 2022 04:20)  T(F): 98.1 (19 Oct 2022 07:24), Max: 99 (19 Oct 2022 04:20)  HR: 60 (19 Oct 2022 07:24) (59 - 86)  BP: 125/77 (19 Oct 2022 07:24) (125/77 - 170/95)  BP(mean): --  RR: 20 (19 Oct 2022 07:24) (20 - 22)  SpO2: 96% (19 Oct 2022 07:24) (95% - 96%)    Parameters below as of 19 Oct 2022 07:24  Patient On (Oxygen Delivery Method): room air        ________________________________________________  PHYSICAL EXAM:  GENERAL: NAD  HEENT: Normocephalic;  conjunctivae and sclerae clear; moist mucous membranes;   NECK : supple  CHEST/LUNG: Clear to auscultation bilaterally with good air entry   HEART: S1 S2  regular; no murmurs, gallops or rubs  ABDOMEN: Soft, Nontender, Nondistended; Bowel sounds present  EXTREMITIES: no cyanosis; no edema; no calf tenderness  SKIN: warm and dry; no rash  NERVOUS SYSTEM:  Awake and alert; Oriented  to place, person and time ; no new deficits    _________________________________________________  LABS:                        16.0   5.05  )-----------( 107      ( 19 Oct 2022 10:30 )             50.0     10-19    139  |  104  |  26<H>  ----------------------------<  147<H>  4.0   |  27  |  1.50<H>    Ca    9.5      19 Oct 2022 10:30  Phos  3.6     10-18  Mg     2.2     10-18    TPro  6.8  /  Alb  3.4<L>  /  TBili  2.8<H>  /  DBili  x   /  AST  24  /  ALT  20  /  AlkPhos  68  10-19        CAPILLARY BLOOD GLUCOSE            RADIOLOGY & ADDITIONAL TESTS:    Imaging  Reviewed:  YES/NO    Consultant(s) Notes Reviewed:   YES/ No      Plan of care was discussed with patient and /or primary care giver; all questions and concerns were addressed  NP Note discussed with  Primary Attending    Patient is a 77y old  Male who presents with a chief complaint of SOB (18 Oct 2022 14:56)      INTERVAL HPI/OVERNIGHT EVENTS: no new complaints    MEDICATIONS  (STANDING):  aspirin enteric coated 81 milliGRAM(s) Oral daily  atorvastatin 80 milliGRAM(s) Oral at bedtime  enoxaparin Injectable 40 milliGRAM(s) SubCutaneous every 24 hours  losartan 50 milliGRAM(s) Oral daily  metoprolol tartrate 12.5 milliGRAM(s) Oral two times a day  tamsulosin 0.8 milliGRAM(s) Oral at bedtime    MEDICATIONS  (PRN):  acetaminophen     Tablet .. 650 milliGRAM(s) Oral every 6 hours PRN Temp greater or equal to 38C (100.4F), Mild Pain (1 - 3)  aluminum hydroxide/magnesium hydroxide/simethicone Suspension 30 milliLiter(s) Oral every 4 hours PRN Dyspepsia  melatonin 3 milliGRAM(s) Oral at bedtime PRN Insomnia  ondansetron Injectable 4 milliGRAM(s) IV Push every 8 hours PRN Nausea and/or Vomiting      __________________________________________________  REVIEW OF SYSTEMS:    CONSTITUTIONAL: No fever,   EYES: no acute visual disturbances  NECK: No pain or stiffness  RESPIRATORY: No cough; No shortness of breath  CARDIOVASCULAR: No chest pain, no palpitations  GASTROINTESTINAL: No pain. No nausea or vomiting; No diarrhea   NEUROLOGICAL: No headache or numbness, no tremors  MUSCULOSKELETAL: No joint pain, no muscle pain  GENITOURINARY: no dysuria, no frequency, no hesitancy  PSYCHIATRY: no depression , no anxiety  ALL OTHER  ROS negative        Vital Signs Last 24 Hrs  T(C): 36.7 (19 Oct 2022 07:24), Max: 37.2 (19 Oct 2022 04:20)  T(F): 98.1 (19 Oct 2022 07:24), Max: 99 (19 Oct 2022 04:20)  HR: 60 (19 Oct 2022 07:24) (59 - 86)  BP: 125/77 (19 Oct 2022 07:24) (125/77 - 170/95)  BP(mean): --  RR: 20 (19 Oct 2022 07:24) (20 - 22)  SpO2: 96% (19 Oct 2022 07:24) (95% - 96%)    Parameters below as of 19 Oct 2022 07:24  Patient On (Oxygen Delivery Method): room air        ________________________________________________  PHYSICAL EXAM:  GENERAL: NAD  HEENT: Normocephalic;  conjunctivae and sclerae clear; moist mucous membranes;   NECK : supple  CHEST/LUNG: Clear to auscultation bilaterally with good air entry   HEART: S1 S2  regular; no murmurs, gallops or rubs  ABDOMEN: Soft, Nontender, Nondistended; Bowel sounds present  EXTREMITIES: no cyanosis; no edema; no calf tenderness  SKIN: warm and dry; no rash  NERVOUS SYSTEM:  Awake and alert; Oriented  to place, person and time ; no new deficits    _________________________________________________  LABS:                        16.0   5.05  )-----------( 107      ( 19 Oct 2022 10:30 )             50.0     10-19    139  |  104  |  26<H>  ----------------------------<  147<H>  4.0   |  27  |  1.50<H>    Ca    9.5      19 Oct 2022 10:30  Phos  3.6     10-18  Mg     2.2     10-18    TPro  6.8  /  Alb  3.4<L>  /  TBili  2.8<H>  /  DBili  x   /  AST  24  /  ALT  20  /  AlkPhos  68  10-19        CAPILLARY BLOOD GLUCOSE            RADIOLOGY & ADDITIONAL TESTS:    Imaging  Reviewed:  YES/NO    Consultant(s) Notes Reviewed:         Plan of care was discussed with patient and /or primary care giver; all questions and concerns were addressed  NP Note discussed with  Primary Attending    Patient is a 77y old  Male who presents with a chief complaint of SOB (18 Oct 2022 14:56)      INTERVAL HPI/OVERNIGHT EVENTS: no new complaints    MEDICATIONS  (STANDING):  aspirin enteric coated 81 milliGRAM(s) Oral daily  atorvastatin 80 milliGRAM(s) Oral at bedtime  enoxaparin Injectable 40 milliGRAM(s) SubCutaneous every 24 hours  losartan 50 milliGRAM(s) Oral daily  metoprolol tartrate 12.5 milliGRAM(s) Oral two times a day  tamsulosin 0.8 milliGRAM(s) Oral at bedtime    MEDICATIONS  (PRN):  acetaminophen     Tablet .. 650 milliGRAM(s) Oral every 6 hours PRN Temp greater or equal to 38C (100.4F), Mild Pain (1 - 3)  aluminum hydroxide/magnesium hydroxide/simethicone Suspension 30 milliLiter(s) Oral every 4 hours PRN Dyspepsia  melatonin 3 milliGRAM(s) Oral at bedtime PRN Insomnia  ondansetron Injectable 4 milliGRAM(s) IV Push every 8 hours PRN Nausea and/or Vomiting      __________________________________________________  REVIEW OF SYSTEMS:    CONSTITUTIONAL: No fever,   EYES: no acute visual disturbances  NECK: No pain or stiffness  RESPIRATORY: No cough; No shortness of breath  CARDIOVASCULAR: No chest pain, no palpitations  GASTROINTESTINAL: No pain. No nausea or vomiting; No diarrhea   NEUROLOGICAL: No headache or numbness, no tremors  MUSCULOSKELETAL: No joint pain, no muscle pain  GENITOURINARY: no dysuria, no frequency, no hesitancy  PSYCHIATRY: no depression , no anxiety  ALL OTHER  ROS negative        Vital Signs Last 24 Hrs  T(C): 36.7 (19 Oct 2022 07:24), Max: 37.2 (19 Oct 2022 04:20)  T(F): 98.1 (19 Oct 2022 07:24), Max: 99 (19 Oct 2022 04:20)  HR: 60 (19 Oct 2022 07:24) (59 - 86)  BP: 125/77 (19 Oct 2022 07:24) (125/77 - 170/95)  BP(mean): --  RR: 20 (19 Oct 2022 07:24) (20 - 22)  SpO2: 96% (19 Oct 2022 07:24) (95% - 96%)    Parameters below as of 19 Oct 2022 07:24  Patient On (Oxygen Delivery Method): room air        ________________________________________________  PHYSICAL EXAM:  GENERAL: NAD  HEENT: Normocephalic;  conjunctivae and sclerae clear; moist mucous membranes;   NECK : supple  CHEST/LUNG: Clear to auscultation bilaterally with good air entry   HEART: S1 S2  regular; no murmurs, gallops or rubs  ABDOMEN: Soft, Nontender, Nondistended; Bowel sounds present  EXTREMITIES: no cyanosis; no edema; no calf tenderness  SKIN: warm and dry; no rash  NERVOUS SYSTEM:  Awake and alert; Oriented  to place, person and time ; no new deficits    _________________________________________________  LABS:                        16.0   5.05  )-----------( 107      ( 19 Oct 2022 10:30 )             50.0     10-19    139  |  104  |  26<H>  ----------------------------<  147<H>  4.0   |  27  |  1.50<H>    Ca    9.5      19 Oct 2022 10:30  Phos  3.6     10-18  Mg     2.2     10-18    TPro  6.8  /  Alb  3.4<L>  /  TBili  2.8<H>  /  DBili  x   /  AST  24  /  ALT  20  /  AlkPhos  68  10-19        CAPILLARY BLOOD GLUCOSE            RADIOLOGY & ADDITIONAL TESTS:    Imaging  Reviewed:            Consultant(s) Notes Reviewed:   YES      Plan of care was discussed with patient and /or primary care giver; all questions and concerns were addressed  NP Note discussed with  Primary Attending    Patient is a 77y old  Male who presents with a chief complaint of SOB (18 Oct 2022 14:56)      INTERVAL HPI/OVERNIGHT EVENTS: no new complaints    MEDICATIONS  (STANDING):  aspirin enteric coated 81 milliGRAM(s) Oral daily  atorvastatin 80 milliGRAM(s) Oral at bedtime  enoxaparin Injectable 40 milliGRAM(s) SubCutaneous every 24 hours  losartan 50 milliGRAM(s) Oral daily  metoprolol tartrate 12.5 milliGRAM(s) Oral two times a day  tamsulosin 0.8 milliGRAM(s) Oral at bedtime    MEDICATIONS  (PRN):  acetaminophen     Tablet .. 650 milliGRAM(s) Oral every 6 hours PRN Temp greater or equal to 38C (100.4F), Mild Pain (1 - 3)  aluminum hydroxide/magnesium hydroxide/simethicone Suspension 30 milliLiter(s) Oral every 4 hours PRN Dyspepsia  melatonin 3 milliGRAM(s) Oral at bedtime PRN Insomnia  ondansetron Injectable 4 milliGRAM(s) IV Push every 8 hours PRN Nausea and/or Vomiting      __________________________________________________  REVIEW OF SYSTEMS:    CONSTITUTIONAL: No fever,   EYES: no acute visual disturbances  NECK: No pain or stiffness  RESPIRATORY: No cough; No shortness of breath  CARDIOVASCULAR: No chest pain, no palpitations  GASTROINTESTINAL: No pain. No nausea or vomiting; No diarrhea   NEUROLOGICAL: No headache or numbness, no tremors  MUSCULOSKELETAL: No joint pain, no muscle pain  GENITOURINARY: no dysuria, no frequency, no hesitancy  PSYCHIATRY: no depression , no anxiety  ALL OTHER  ROS negative        Vital Signs Last 24 Hrs  T(C): 36.7 (19 Oct 2022 07:24), Max: 37.2 (19 Oct 2022 04:20)  T(F): 98.1 (19 Oct 2022 07:24), Max: 99 (19 Oct 2022 04:20)  HR: 60 (19 Oct 2022 07:24) (59 - 86)  BP: 125/77 (19 Oct 2022 07:24) (125/77 - 170/95)  BP(mean): --  RR: 20 (19 Oct 2022 07:24) (20 - 22)  SpO2: 96% (19 Oct 2022 07:24) (95% - 96%)    Parameters below as of 19 Oct 2022 07:24  Patient On (Oxygen Delivery Method): room air        ________________________________________________  PHYSICAL EXAM:  GENERAL: NAD  HEENT: Normocephalic;  conjunctivae and sclerae clear; moist mucous membranes;   NECK : supple  CHEST/LUNG: Clear to auscultation bilaterally with good air entry   HEART: S1 S2  regular; no murmurs, gallops or rubs  ABDOMEN: Soft, Nontender, Nondistended; Bowel sounds present  EXTREMITIES: no cyanosis; no edema; no calf tenderness  SKIN: warm and dry; no rash  NERVOUS SYSTEM:  Awake and alert; Oriented  to place, person and time ; no new deficits    _________________________________________________  LABS:                        16.0   5.05  )-----------( 107      ( 19 Oct 2022 10:30 )             50.0     10-19    139  |  104  |  26<H>  ----------------------------<  147<H>  4.0   |  27  |  1.50<H>    Ca    9.5      19 Oct 2022 10:30  Phos  3.6     10-18  Mg     2.2     10-18    TPro  6.8  /  Alb  3.4<L>  /  TBili  2.8<H>  /  DBili  x   /  AST  24  /  ALT  20  /  AlkPhos  68  10-19        CAPILLARY BLOOD GLUCOSE            RADIOLOGY & ADDITIONAL TESTS:    Imaging  Reviewed:        < from: Xray Chest 2 Views PA/Lat (10.18.22 @ 13:56) >  Impression:    No acute pulmonary disease or interval change.    < end of copied text >      Consultant(s) Notes Reviewed:   YES      Plan of care was discussed with patient and /or primary care giver; all questions and concerns were addressed

## 2022-10-19 NOTE — PROGRESS NOTE ADULT - PROBLEM SELECTOR PLAN 4
BNP 2551  echo ordered,   in setting of covid must wait a couple days possible heart failure, not in exacerbation  BNP 2551  echo ordered

## 2022-10-19 NOTE — CHART NOTE - NSCHARTNOTEFT_GEN_A_CORE
Electrophysiology.  Device : Altrua 60 [id number]  [employer]                               Estimated battery longevity:  >5.0 years    Mode: VVI    Lower rate/UTR: 50/170 bpm    Leads:    RV    Sensin.75mV    Impedance:  830ohms    Pacing output : 3.5V @ 0.6ms      Pulse amplitude V: 2.3mV    Episodes: None    Counters:  V paced:  27%  V Sensed: 73%    Changes made: None    Conclusion: Normal device function

## 2022-10-20 LAB
ALBUMIN SERPL ELPH-MCNC: 3.2 G/DL — LOW (ref 3.5–5)
ALP SERPL-CCNC: 67 U/L — SIGNIFICANT CHANGE UP (ref 40–120)
ALT FLD-CCNC: 21 U/L DA — SIGNIFICANT CHANGE UP (ref 10–60)
ANION GAP SERPL CALC-SCNC: 11 MMOL/L — SIGNIFICANT CHANGE UP (ref 5–17)
AST SERPL-CCNC: 27 U/L — SIGNIFICANT CHANGE UP (ref 10–40)
BILIRUB DIRECT SERPL-MCNC: 0.6 MG/DL — HIGH (ref 0–0.3)
BILIRUB INDIRECT FLD-MCNC: 1.8 MG/DL — HIGH (ref 0.2–1)
BILIRUB SERPL-MCNC: 2.4 MG/DL — HIGH (ref 0.2–1.2)
BUN SERPL-MCNC: 25 MG/DL — HIGH (ref 7–18)
CALCIUM SERPL-MCNC: 9.2 MG/DL — SIGNIFICANT CHANGE UP (ref 8.4–10.5)
CHLORIDE SERPL-SCNC: 107 MMOL/L — SIGNIFICANT CHANGE UP (ref 96–108)
CO2 SERPL-SCNC: 24 MMOL/L — SIGNIFICANT CHANGE UP (ref 22–31)
CREAT SERPL-MCNC: 1.06 MG/DL — SIGNIFICANT CHANGE UP (ref 0.5–1.3)
EGFR: 72 ML/MIN/1.73M2 — SIGNIFICANT CHANGE UP
GLUCOSE SERPL-MCNC: 76 MG/DL — SIGNIFICANT CHANGE UP (ref 70–99)
HCT VFR BLD CALC: 50.4 % — HIGH (ref 39–50)
HGB BLD-MCNC: 16.5 G/DL — SIGNIFICANT CHANGE UP (ref 13–17)
INR BLD: 1.27 RATIO — HIGH (ref 0.88–1.16)
MCHC RBC-ENTMCNC: 29.3 PG — SIGNIFICANT CHANGE UP (ref 27–34)
MCHC RBC-ENTMCNC: 32.7 GM/DL — SIGNIFICANT CHANGE UP (ref 32–36)
MCV RBC AUTO: 89.4 FL — SIGNIFICANT CHANGE UP (ref 80–100)
NRBC # BLD: 0 /100 WBCS — SIGNIFICANT CHANGE UP (ref 0–0)
PLATELET # BLD AUTO: 105 K/UL — LOW (ref 150–400)
POTASSIUM SERPL-MCNC: 4.1 MMOL/L — SIGNIFICANT CHANGE UP (ref 3.5–5.3)
POTASSIUM SERPL-SCNC: 4.1 MMOL/L — SIGNIFICANT CHANGE UP (ref 3.5–5.3)
PROT SERPL-MCNC: 6.5 G/DL — SIGNIFICANT CHANGE UP (ref 6–8.3)
PROTHROM AB SERPL-ACNC: 15.1 SEC — HIGH (ref 10.5–13.4)
RBC # BLD: 5.64 M/UL — SIGNIFICANT CHANGE UP (ref 4.2–5.8)
RBC # FLD: 13.8 % — SIGNIFICANT CHANGE UP (ref 10.3–14.5)
SODIUM SERPL-SCNC: 142 MMOL/L — SIGNIFICANT CHANGE UP (ref 135–145)
WBC # BLD: 5.32 K/UL — SIGNIFICANT CHANGE UP (ref 3.8–10.5)
WBC # FLD AUTO: 5.32 K/UL — SIGNIFICANT CHANGE UP (ref 3.8–10.5)

## 2022-10-20 RX ORDER — BENZOCAINE AND MENTHOL 5; 1 G/100ML; G/100ML
1 LIQUID ORAL
Refills: 0 | Status: DISCONTINUED | OUTPATIENT
Start: 2022-10-20 | End: 2022-10-22

## 2022-10-20 RX ORDER — REMDESIVIR 5 MG/ML
INJECTION INTRAVENOUS
Refills: 0 | Status: DISCONTINUED | OUTPATIENT
Start: 2022-10-20 | End: 2022-10-22

## 2022-10-20 RX ORDER — REMDESIVIR 5 MG/ML
200 INJECTION INTRAVENOUS EVERY 24 HOURS
Refills: 0 | Status: COMPLETED | OUTPATIENT
Start: 2022-10-20 | End: 2022-10-20

## 2022-10-20 RX ORDER — REMDESIVIR 5 MG/ML
100 INJECTION INTRAVENOUS EVERY 24 HOURS
Refills: 0 | Status: DISCONTINUED | OUTPATIENT
Start: 2022-10-21 | End: 2022-10-22

## 2022-10-20 RX ADMIN — Medication 12.5 MILLIGRAM(S): at 18:40

## 2022-10-20 RX ADMIN — ATORVASTATIN CALCIUM 80 MILLIGRAM(S): 80 TABLET, FILM COATED ORAL at 21:22

## 2022-10-20 RX ADMIN — Medication 81 MILLIGRAM(S): at 11:59

## 2022-10-20 RX ADMIN — TAMSULOSIN HYDROCHLORIDE 0.8 MILLIGRAM(S): 0.4 CAPSULE ORAL at 21:22

## 2022-10-20 RX ADMIN — Medication 3 MILLIGRAM(S): at 21:22

## 2022-10-20 RX ADMIN — HEPARIN SODIUM 5000 UNIT(S): 5000 INJECTION INTRAVENOUS; SUBCUTANEOUS at 06:39

## 2022-10-20 RX ADMIN — LOSARTAN POTASSIUM 50 MILLIGRAM(S): 100 TABLET, FILM COATED ORAL at 06:39

## 2022-10-20 RX ADMIN — SODIUM CHLORIDE 50 MILLILITER(S): 9 INJECTION INTRAMUSCULAR; INTRAVENOUS; SUBCUTANEOUS at 12:55

## 2022-10-20 RX ADMIN — Medication 6 MILLIGRAM(S): at 06:39

## 2022-10-20 RX ADMIN — HEPARIN SODIUM 5000 UNIT(S): 5000 INJECTION INTRAVENOUS; SUBCUTANEOUS at 18:40

## 2022-10-20 RX ADMIN — SODIUM CHLORIDE 50 MILLILITER(S): 9 INJECTION INTRAMUSCULAR; INTRAVENOUS; SUBCUTANEOUS at 06:39

## 2022-10-20 RX ADMIN — REMDESIVIR 500 MILLIGRAM(S): 5 INJECTION INTRAVENOUS at 12:53

## 2022-10-20 NOTE — PROGRESS NOTE ADULT - SUBJECTIVE AND OBJECTIVE BOX
PGY-2 Progress Note discussed with attending    CHIEF COMPLAINT & BRIEF HOSPITAL COURSE:  77 year old male, from home, PMH of HTN, CVA (no residual deficits), CAD (s/p stents), PPM, hx of MI (s/p CABG), presents to the ED with shortness of breath and cough for the past 2 days. Admitted to medicine for sepsis with acute hypoxic respiratory failure 2/2 COVID PNA current SpO2 96% on 3L NC, and acute kidney injury.    INTERVAL HPI/OVERNIGHT EVENTS:   pt reports sore throat and congestion    REVIEW OF SYSTEMS:  CONSTITUTIONAL: No fever, weight loss, or fatigue  RESPIRATORY: No cough, wheezing, chills or hemoptysis; No shortness of breath  CARDIOVASCULAR: No chest pain, palpitations, dizziness, or leg swelling  GASTROINTESTINAL: No abdominal pain. No nausea, vomiting, or hematemesis; No diarrhea or constipation. No melena or hematochezia.  GENITOURINARY: No dysuria or hematuria, urinary frequency  NEUROLOGICAL: No headaches, memory loss, loss of strength, numbness, or tremors  SKIN: No itching, burning, rashes, or lesions     MEDICATIONS  (STANDING):  aspirin enteric coated 81 milliGRAM(s) Oral daily  atorvastatin 80 milliGRAM(s) Oral at bedtime  dexAMETHasone  Injectable 6 milliGRAM(s) IV Push daily  heparin   Injectable 5000 Unit(s) SubCutaneous every 12 hours  losartan 50 milliGRAM(s) Oral daily  metoprolol tartrate 12.5 milliGRAM(s) Oral two times a day  remdesivir  IVPB   IV Intermittent   sodium chloride 0.9%. 1000 milliLiter(s) (50 mL/Hr) IV Continuous <Continuous>  tamsulosin 0.8 milliGRAM(s) Oral at bedtime    MEDICATIONS  (PRN):  acetaminophen     Tablet .. 650 milliGRAM(s) Oral every 6 hours PRN Temp greater or equal to 38C (100.4F), Mild Pain (1 - 3)  aluminum hydroxide/magnesium hydroxide/simethicone Suspension 30 milliLiter(s) Oral every 4 hours PRN Dyspepsia  benzocaine 15 mG/menthol 3.6 mG Lozenge 1 Lozenge Oral every 3 hours PRN Sore Throat  melatonin 3 milliGRAM(s) Oral at bedtime PRN Insomnia  ondansetron Injectable 4 milliGRAM(s) IV Push every 8 hours PRN Nausea and/or Vomiting      Vital Signs Last 24 Hrs  T(C): 36.7 (20 Oct 2022 14:00), Max: 37.1 (20 Oct 2022 02:11)  T(F): 98 (20 Oct 2022 14:00), Max: 98.8 (20 Oct 2022 02:11)  HR: 68 (20 Oct 2022 14:00) (52 - 68)  BP: 122/77 (20 Oct 2022 14:00) (122/77 - 165/89)  BP(mean): --  RR: 20 (20 Oct 2022 14:00) (16 - 20)  SpO2: 96% (20 Oct 2022 14:00) (96% - 100%)    Parameters below as of 20 Oct 2022 14:00  Patient On (Oxygen Delivery Method): nasal cannula  O2 Flow (L/min): 3      PHYSICAL EXAMINATION:  GENERAL: NAD  HEENT: Normocephalic;  conjunctivae and sclerae clear; moist mucous membranes;   NECK : supple  CHEST/LUNG: Clear to auscultation bilaterally with good air entry   HEART: S1 S2  regular; no murmurs, gallops or rubs  ABDOMEN: Soft, Nontender, Nondistended; Bowel sounds present  EXTREMITIES: no cyanosis; no edema; no calf tenderness  SKIN: warm and dry; no rash  NERVOUS SYSTEM:  Awake and alert; Oriented  to place, person and time ; no new deficits                          16.5   5.32  )-----------( 105      ( 20 Oct 2022 08:02 )             50.4     10-20    142  |  107  |  25<H>  ----------------------------<  76  4.1   |  24  |  1.06    Ca    9.2      20 Oct 2022 08:02  Phos  3.6     10-18  Mg     2.2     10-18    TPro  6.5  /  Alb  3.2<L>  /  TBili  2.4<H>  /  DBili  0.6<H>  /  AST  27  /  ALT  21  /  AlkPhos  67  10-20    LIVER FUNCTIONS - ( 20 Oct 2022 08:02 )  Alb: 3.2 g/dL / Pro: 6.5 g/dL / ALK PHOS: 67 U/L / ALT: 21 U/L DA / AST: 27 U/L / GGT: x               PT/INR - ( 20 Oct 2022 08:02 )   PT: 15.1 sec;   INR: 1.27 ratio             I&O's Summary          CAPILLARY BLOOD GLUCOSE      RADIOLOGY & ADDITIONAL TESTS:

## 2022-10-20 NOTE — PATIENT PROFILE ADULT - FALL HARM RISK - HARM RISK INTERVENTIONS

## 2022-10-20 NOTE — PATIENT PROFILE ADULT - MEDICATIONS/VISITS
[FreeTextEntry1] : Chronic medical condition in stable condition\par cardiac care with Dr. Chaney\par Has no complaints\par Will contact the patient with blood test results no

## 2022-10-20 NOTE — PROGRESS NOTE ADULT - SUBJECTIVE AND OBJECTIVE BOX
PATIENT SEEN AND EXAMINED ON :- 10/20/22  DATE OF SERVICE:    10/20/22         Interim events noted,Labs ,Radiological studies and Cardiology tests reviewed .       HOSPITAL COURSE: HPI:  77 year old male, from home, PMH of HTN, CVA (no residual deficits), CAD (s/p stents), PPM, hx of MI (s/p CABG), presents to the ED with shortness of breath and cough for the past 2 days. Pt states that he feels short of breath when lying down at night and when walking to the bathroom. He uses 2-3 pillows to sleep at night. He states he has generalized weakness and subjective fevers. He had one episode of dizziness while sitting down. States he has mild chest pain, dry cough and mild abdominal pain. Denies sick contact or recent travel. Denies headache, sore throat, palpitations, diarrhea, constipation, dysuria or leg swelling.    In ED: vitals , /88, Temp 98.3F, 95% on RA, RR 22  EKG shows accelerated Junctional rhythm with retrograde conduction with frequent PVCs. Left axis deviation. Inferolateral infarct, age indeterminate. Unchanged from previous EKG in March 2022.  s/p IV Lasix 20mg in ED  Found to be COVID +  (18 Oct 2022 14:56)      INTERIM EVENTS:Patient seen at bedside ,interim events noted.      PMH -reviewed admission note, no change since admission  HEART FAILURE: Acute[ ]Chronic[ ] Systolic[ ] Diastolic[ ] Combined Systolic and Diastolic[ ]  CAD[ ] CABG[ ] PCI[ ]  DEVICES[ ] PPM[ ] ICD[ ] ILR[ ]  ATRIAL FIBRILLATION[ ] Paroxysmal[ ] Permanent[ ] CHADS2-[  ]  GINNY[ ] CKD1[ ] CKD2[ ] CKD3[ ] CKD4[ ] ESRD[ ]  COPD[ ] HTN[ ]   DM[ ] Type1[ ] Type 2[ ]   CVA[ ] Paresis[ ]    AMBULATION: Assisted[ ] Cane/walker[ ] Independent[ ]    MEDICATIONS  (STANDING):  aspirin enteric coated 81 milliGRAM(s) Oral daily  atorvastatin 80 milliGRAM(s) Oral at bedtime  dexAMETHasone  Injectable 6 milliGRAM(s) IV Push daily  heparin   Injectable 5000 Unit(s) SubCutaneous every 12 hours  losartan 50 milliGRAM(s) Oral daily  metoprolol tartrate 12.5 milliGRAM(s) Oral two times a day  remdesivir  IVPB   IV Intermittent   sodium chloride 0.9%. 1000 milliLiter(s) (50 mL/Hr) IV Continuous <Continuous>  tamsulosin 0.8 milliGRAM(s) Oral at bedtime    MEDICATIONS  (PRN):  acetaminophen     Tablet .. 650 milliGRAM(s) Oral every 6 hours PRN Temp greater or equal to 38C (100.4F), Mild Pain (1 - 3)  aluminum hydroxide/magnesium hydroxide/simethicone Suspension 30 milliLiter(s) Oral every 4 hours PRN Dyspepsia  benzocaine 15 mG/menthol 3.6 mG Lozenge 1 Lozenge Oral every 3 hours PRN Sore Throat  melatonin 3 milliGRAM(s) Oral at bedtime PRN Insomnia  ondansetron Injectable 4 milliGRAM(s) IV Push every 8 hours PRN Nausea and/or Vomiting            REVIEW OF SYSTEMS:  Constitutional: [ ] fever, [ ]weight loss,  [ ]fatigue [ ]weight gain  Eyes: [ ] visual changes  Respiratory: [ ]shortness of breath;  [ ] cough, [ ]wheezing, [ ]chills, [ ]hemoptysis  Cardiovascular: [ ] chest pain, [ ]palpitations, [ ]dizziness,  [ ]leg swelling[ ]orthopnea[ ]PND  Gastrointestinal: [ ] abdominal pain, [ ]nausea, [ ]vomiting,  [ ]diarrhea [ ]Constipation [ ]Melena  Genitourinary: [ ] dysuria, [ ] hematuria [ ]Delarosa  Neurologic: [ ] headaches [ ] tremors[ ]weakness [ ]Paralysis Right[ ] Left[ ]  Skin: [ ] itching, [ ]burning, [ ] rashes  Endocrine: [ ] heat or cold intolerance  Musculoskeletal: [ ] joint pain or swelling; [ ] muscle, back, or extremity pain  Psychiatric: [ ] depression, [ ]anxiety, [ ]mood swings, or [ ]difficulty sleeping  Hematologic: [ ] easy bruising, [ ] bleeding gums    [ ] All remaining systems negative except as per above.   [ ]Unable to obtain.  [x] No change in ROS since admission      Vital Signs Last 24 Hrs  T(C): 36.6 (20 Oct 2022 05:51), Max: 37.1 (20 Oct 2022 02:11)  T(F): 97.9 (20 Oct 2022 05:51), Max: 98.8 (20 Oct 2022 02:11)  HR: 58 (20 Oct 2022 05:51) (52 - 64)  BP: 140/94 (20 Oct 2022 05:51) (139/82 - 165/89)  BP(mean): --  RR: 18 (20 Oct 2022 05:51) (16 - 20)  SpO2: 99% (20 Oct 2022 05:51) (98% - 100%)    Parameters below as of 20 Oct 2022 05:51  Patient On (Oxygen Delivery Method): nasal cannula  O2 Flow (L/min): 3    I&O's Summary      PHYSICAL EXAM:  General: No acute distress BMI-  HEENT: EOMI, PERRL  Neck: Supple, [ ] JVD  Lungs: Equal air entry bilaterally; [ ] rales [ ] wheezing [ ] rhonchi  Heart: Regular rate and rhythm; [x ] murmur   2/6 [ x] systolic [ ] diastolic [ ] radiation[ ] rubs [ ]  gallops  Abdomen: Nontender, bowel sounds present  Extremities: No clubbing, cyanosis, [ ] edema [ ]Pulses  equal and intact  Nervous system:  Alert & Oriented X3, no focal deficits  Psychiatric: Normal affect  Skin: No rashes or lesions    LABS:  10-20    142  |  107  |  25<H>  ----------------------------<  76  4.1   |  24  |  1.06    Ca    9.2      20 Oct 2022 08:02  Phos  3.6     10-18  Mg     2.2     10-18    TPro  6.5  /  Alb  3.2<L>  /  TBili  2.4<H>  /  DBili  0.6<H>  /  AST  27  /  ALT  21  /  AlkPhos  67  10-20    Creatinine Trend: 1.06<--, 1.50<--, 1.34<--, 1.28<--                        16.5   5.32  )-----------( 105      ( 20 Oct 2022 08:02 )             50.4     PT/INR - ( 20 Oct 2022 08:02 )   PT: 15.1 sec;   INR: 1.27 ratio         < from: Transthoracic Echocardiogram (06.20.18 @ 13:19) >    Patient name: MAIK LE  YOB: 1945   Age: 72 (M)   MR#: [id number]  Study Date: 6/20/2018  Location: 422ASonographer: Philly Lynne RDCS  Study quality: Technically good  Referring Physician: Selma Lemons MD  Blood Pressure: 123/88 mmHg  Height: 163 cm  Weight: 69 kg  BSA: 1.8 m2  ------------------------------------------------------------------------  PROCEDURE: Transthoracic echocardiogram with 2-D, M-Mode  and complete spectral and color flow Doppler.  INDICATION: ST elevation (STEMI) myocardial infarction of  unspecified site (I21.3)  ------------------------------------------------------------------------  DIMENSIONS:  Dimensions:     Normal Values:  LA:     3.1 cm    2.0 - 4.0 cm  Ao:     3.5 cm    2.0 - 3.8cm  SEPTUM: 0.8 cm    0.6 - 1.2 cm  PWT:    0.8 cm    0.6 - 1.1 cm  LVIDd:  4.1 cm    3.0 - 5.6 cm  LVIDs:  2.7 cm    1.8 - 4.0 cm  Derived Variables:  LVMI: 56 g/m2  RWT: 0.39  Fractional short: 34 %  Ejection Fraction (Teicholtz): 64 %  ------------------------------------------------------------------------  OBSERVATIONS:  Mitral Valve: Mitral annular calcification, otherwise  normal mitral valve. Mild mitral regurgitation.  Aortic Root: Normal aortic root.  Aortic Valve: Calcified trileaflet aortic valve with normal  opening.  Left Atrium: Normal left atrium.  LA volume index = 24  cc/m2.  Left Ventricle: Normal left ventricular systolic function.  No segmental wall motion abnormalities. Normal left  ventricular internal dimensions and wall thicknesses.  Right Heart: Normal right atrium. Normal right ventricular  size and function.   A device wire is noted in the right  heart. Normal tricuspid valve. Mild tricuspid  regurgitation. Normal pulmonic valve. Minimal pulmonic  regurgitation.  Pericardium/PleuraNormal pericardium with no pericardial  effusion.  Hemodynamic: Estimated right ventricular systolic pressure  equals 22 mm Hg, assuming right atrial pressure equals 10  mm Hg, consistent with normal pulmonary pressures.  ------------------------------------------------------------------------  CONCLUSIONS:  1. Mitral annular calcification, otherwise normal mitral  valve. Mild mitral regurgitation.  2. Normal left ventricular internal dimensions and wall  thicknesses.  3. Normal left ventricular systolic function. No segmental  wall motion abnormalities.  4. Normal right ventricular size and function.   A device  wire is noted in the right heart.  *** Compared with echocardiogram of 3/3/2016, no  significant changes noted.  ------------------------------------------------------------------------  Confirmed on  6/20/2018 - 13:17:58 by Isidro Schaefer M.D.  ------------------------------------------------------------------------    < end of copied text >

## 2022-10-20 NOTE — PROGRESS NOTE ADULT - PROBLEM SELECTOR PLAN 1
2/2 covid pneumonia  dexamethasone 6mg IVP daily   hold off on remdesivir for now in setting of shanta,   saturating well on  ra, wean oxygen  ID Dr. Ireland

## 2022-10-20 NOTE — PROGRESS NOTE ADULT - ASSESSMENT
COVID - 19 infection        Plan - Cont Decadron 6mgs iv q24hrs  Cont Remdesivir 100mgs iv q24hrs from tomorrow x 4 days more.

## 2022-10-20 NOTE — PROGRESS NOTE ADULT - PROBLEM SELECTOR PLAN 3
likely prerenal from dehydration/infection  creatinine climbed from 1.28 to 1.5 (baseline unknown)  started on NS at 50 mL/hr    resolved

## 2022-10-20 NOTE — PROGRESS NOTE ADULT - SUBJECTIVE AND OBJECTIVE BOX
77y Male    Meds:  remdesivir  IVPB   IV Intermittent     Allergies    penicillin (Other)    Intolerances        VITALS:  Vital Signs Last 24 Hrs  T(C): 36.7 (20 Oct 2022 14:00), Max: 37.1 (20 Oct 2022 02:11)  T(F): 98 (20 Oct 2022 14:00), Max: 98.8 (20 Oct 2022 02:11)  HR: 68 (20 Oct 2022 14:00) (52 - 68)  BP: 122/77 (20 Oct 2022 14:00) (122/77 - 165/89)  BP(mean): --  RR: 20 (20 Oct 2022 14:00) (16 - 20)  SpO2: 96% (20 Oct 2022 14:00) (96% - 100%)    Parameters below as of 20 Oct 2022 14:00  Patient On (Oxygen Delivery Method): nasal cannula  O2 Flow (L/min): 3      LABS/DIAGNOSTIC TESTS:                          16.5   5.32  )-----------( 105      ( 20 Oct 2022 08:02 )             50.4         10-20    142  |  107  |  25<H>  ----------------------------<  76  4.1   |  24  |  1.06    Ca    9.2      20 Oct 2022 08:02  Phos  3.6     10-18  Mg     2.2     10-18    TPro  6.5  /  Alb  3.2<L>  /  TBili  2.4<H>  /  DBili  0.6<H>  /  AST  27  /  ALT  21  /  AlkPhos  67  10-20      LIVER FUNCTIONS - ( 20 Oct 2022 08:02 )  Alb: 3.2 g/dL / Pro: 6.5 g/dL / ALK PHOS: 67 U/L / ALT: 21 U/L DA / AST: 27 U/L / GGT: x             CULTURES:       RADIOLOGY:      ROS:  [  ] UNABLE TO ELICIT 77y Male who is doing much better today , he has less coughing , he has minimal SOB but none on oxygen, he has no nausea, vomiting or diarrhea, he has no chest pain or other complaints. His creatinine normalized and so was started on Remdesivir today.    Meds:  remdesivir  IVPB   IV Intermittent     Allergies    penicillin (Other)    Intolerances        VITALS:  Vital Signs Last 24 Hrs  T(C): 36.7 (20 Oct 2022 14:00), Max: 37.1 (20 Oct 2022 02:11)  T(F): 98 (20 Oct 2022 14:00), Max: 98.8 (20 Oct 2022 02:11)  HR: 68 (20 Oct 2022 14:00) (52 - 68)  BP: 122/77 (20 Oct 2022 14:00) (122/77 - 165/89)  BP(mean): --  RR: 20 (20 Oct 2022 14:00) (16 - 20)  SpO2: 96% (20 Oct 2022 14:00) (96% - 100%)    Parameters below as of 20 Oct 2022 14:00  Patient On (Oxygen Delivery Method): nasal cannula  O2 Flow (L/min): 3      LABS/DIAGNOSTIC TESTS:                          16.5   5.32  )-----------( 105      ( 20 Oct 2022 08:02 )             50.4         10-20    142  |  107  |  25<H>  ----------------------------<  76  4.1   |  24  |  1.06    Ca    9.2      20 Oct 2022 08:02  Phos  3.6     10-18  Mg     2.2     10-18    TPro  6.5  /  Alb  3.2<L>  /  TBili  2.4<H>  /  DBili  0.6<H>  /  AST  27  /  ALT  21  /  AlkPhos  67  10-20      LIVER FUNCTIONS - ( 20 Oct 2022 08:02 )  Alb: 3.2 g/dL / Pro: 6.5 g/dL / ALK PHOS: 67 U/L / ALT: 21 U/L DA / AST: 27 U/L / GGT: x             CULTURES:       RADIOLOGY:      ROS:  [  ] UNABLE TO ELICIT

## 2022-10-20 NOTE — PROGRESS NOTE ADULT - PROBLEM SELECTOR PLAN 1
2/2 covid pneumonia  procalcitonin 0.16  CRP 50  dimer 174  cxr unremarkable   SpO2 96% on 3L NC  started on dexamethasone 6mg IVP daily   start remdesivir as GINNY resolved  ID Dr. Ireland

## 2022-10-21 ENCOUNTER — TRANSCRIPTION ENCOUNTER (OUTPATIENT)
Age: 77
End: 2022-10-21

## 2022-10-21 LAB
ANION GAP SERPL CALC-SCNC: 9 MMOL/L — SIGNIFICANT CHANGE UP (ref 5–17)
BUN SERPL-MCNC: 34 MG/DL — HIGH (ref 7–18)
CALCIUM SERPL-MCNC: 8.8 MG/DL — SIGNIFICANT CHANGE UP (ref 8.4–10.5)
CHLORIDE SERPL-SCNC: 111 MMOL/L — HIGH (ref 96–108)
CO2 SERPL-SCNC: 22 MMOL/L — SIGNIFICANT CHANGE UP (ref 22–31)
CREAT SERPL-MCNC: 1.21 MG/DL — SIGNIFICANT CHANGE UP (ref 0.5–1.3)
EGFR: 62 ML/MIN/1.73M2 — SIGNIFICANT CHANGE UP
GLUCOSE SERPL-MCNC: 99 MG/DL — SIGNIFICANT CHANGE UP (ref 70–99)
HCT VFR BLD CALC: 45.6 % — SIGNIFICANT CHANGE UP (ref 39–50)
HGB BLD-MCNC: 15.1 G/DL — SIGNIFICANT CHANGE UP (ref 13–17)
INR BLD: 1.21 RATIO — HIGH (ref 0.88–1.16)
MAGNESIUM SERPL-MCNC: 2.1 MG/DL — SIGNIFICANT CHANGE UP (ref 1.6–2.6)
MCHC RBC-ENTMCNC: 29.6 PG — SIGNIFICANT CHANGE UP (ref 27–34)
MCHC RBC-ENTMCNC: 33.1 GM/DL — SIGNIFICANT CHANGE UP (ref 32–36)
MCV RBC AUTO: 89.4 FL — SIGNIFICANT CHANGE UP (ref 80–100)
NRBC # BLD: 0 /100 WBCS — SIGNIFICANT CHANGE UP (ref 0–0)
PHOSPHATE SERPL-MCNC: 3.8 MG/DL — SIGNIFICANT CHANGE UP (ref 2.5–4.5)
PLATELET # BLD AUTO: 109 K/UL — LOW (ref 150–400)
POTASSIUM SERPL-MCNC: 4.3 MMOL/L — SIGNIFICANT CHANGE UP (ref 3.5–5.3)
POTASSIUM SERPL-SCNC: 4.3 MMOL/L — SIGNIFICANT CHANGE UP (ref 3.5–5.3)
PROTHROM AB SERPL-ACNC: 14.4 SEC — HIGH (ref 10.5–13.4)
RBC # BLD: 5.1 M/UL — SIGNIFICANT CHANGE UP (ref 4.2–5.8)
RBC # FLD: 13.4 % — SIGNIFICANT CHANGE UP (ref 10.3–14.5)
SODIUM SERPL-SCNC: 142 MMOL/L — SIGNIFICANT CHANGE UP (ref 135–145)
WBC # BLD: 7.71 K/UL — SIGNIFICANT CHANGE UP (ref 3.8–10.5)
WBC # FLD AUTO: 7.71 K/UL — SIGNIFICANT CHANGE UP (ref 3.8–10.5)

## 2022-10-21 RX ORDER — TAMSULOSIN HYDROCHLORIDE 0.4 MG/1
2 CAPSULE ORAL
Qty: 0 | Refills: 0 | DISCHARGE
Start: 2022-10-21

## 2022-10-21 RX ORDER — LOSARTAN POTASSIUM 100 MG/1
1 TABLET, FILM COATED ORAL
Qty: 0 | Refills: 0 | DISCHARGE

## 2022-10-21 RX ORDER — ALFUZOSIN HYDROCHLORIDE 10 MG/1
1 TABLET, EXTENDED RELEASE ORAL
Qty: 0 | Refills: 0 | DISCHARGE

## 2022-10-21 RX ORDER — LOSARTAN POTASSIUM 100 MG/1
1 TABLET, FILM COATED ORAL
Qty: 0 | Refills: 0 | DISCHARGE
Start: 2022-10-21

## 2022-10-21 RX ADMIN — Medication 12.5 MILLIGRAM(S): at 17:48

## 2022-10-21 RX ADMIN — TAMSULOSIN HYDROCHLORIDE 0.8 MILLIGRAM(S): 0.4 CAPSULE ORAL at 21:35

## 2022-10-21 RX ADMIN — HEPARIN SODIUM 5000 UNIT(S): 5000 INJECTION INTRAVENOUS; SUBCUTANEOUS at 05:45

## 2022-10-21 RX ADMIN — Medication 6 MILLIGRAM(S): at 05:45

## 2022-10-21 RX ADMIN — HEPARIN SODIUM 5000 UNIT(S): 5000 INJECTION INTRAVENOUS; SUBCUTANEOUS at 17:47

## 2022-10-21 RX ADMIN — Medication 81 MILLIGRAM(S): at 11:08

## 2022-10-21 RX ADMIN — REMDESIVIR 500 MILLIGRAM(S): 5 INJECTION INTRAVENOUS at 12:19

## 2022-10-21 RX ADMIN — LOSARTAN POTASSIUM 50 MILLIGRAM(S): 100 TABLET, FILM COATED ORAL at 05:45

## 2022-10-21 RX ADMIN — ATORVASTATIN CALCIUM 80 MILLIGRAM(S): 80 TABLET, FILM COATED ORAL at 21:35

## 2022-10-21 NOTE — PROGRESS NOTE ADULT - TIME BILLING
- Review of records, telemetry, vital signs and daily labs.   - General and cardiovascular physical examination.  - Generation of cardiovascular treatment plan.  - Coordination of care.      Patient was seen and examined by me on 10/19/22interim events noted,labs and radiology studies reviewed.  Lobo Anderson MD,FACC.  [address] [place], [place] [id number].  [phone]
- Review of records, telemetry, vital signs and daily labs.   - General and cardiovascular physical examination.  - Generation of cardiovascular treatment plan.  - Coordination of care.      Patient was seen and examined by me on 10/20/22interim events noted,labs and radiology studies reviewed.  Lobo Anderson MD,FACC.  [address] [place], [place] [id number].  [phone]
- Review of records, telemetry, vital signs and daily labs.   - General and cardiovascular physical examination.  - Generation of cardiovascular treatment plan.  - Coordination of care.      Patient was seen and examined by me on 10/21/2022,interim events noted,labs and radiology studies reviewed.  Lobo Anderson MD,FACC.  [address] [place], [place] [id number].  [phone]

## 2022-10-21 NOTE — DISCHARGE NOTE PROVIDER - HOSPITAL COURSE
77 year old male, from home, PMH of HTN, CVA (no residual deficits), CAD (s/p stents), PPM, hx of MI (s/p CABG), presents to the ED with shortness of breath and cough for the past 2 days. Admitted to medicine for sepsis with acute hypoxic respiratory failure 2/2 COVID PNA current SpO2 96% on 3L NC, and acute kidney injury. Pt was started on decadron for AHRF 2/2 covid. Pt also noted with resolved GINNY, was then started on remdesivir. Was successfully weaned to room air, saturating to high 90s. Patient recommended for outpatient TTE for evaluation of cardiac function in view of concern for fluid overload upon admission. For h/o CAD, HTN pt was continued on statin and metoprolol. For h/o BPH, continued on tamsulosin home dose. Patient was deemed medically stable for discharge by primary medical team with home care.

## 2022-10-21 NOTE — PROGRESS NOTE ADULT - RESPIRATORY
clear to auscultation bilaterally/no wheezes/no rales/no rhonchi/breath sounds equal
clear to auscultation bilaterally/no wheezes/no rales/no rhonchi

## 2022-10-21 NOTE — PROGRESS NOTE ADULT - MUSCULOSKELETAL
no joint swelling/no joint erythema/no joint warmth
no joint swelling/no joint erythema/no joint warmth

## 2022-10-21 NOTE — PROGRESS NOTE ADULT - SUBJECTIVE AND OBJECTIVE BOX
PGY-2 Progress Note discussed with attending    CHIEF COMPLAINT & BRIEF HOSPITAL COURSE:  77 year old male, from home, PMH of HTN, CVA (no residual deficits), CAD (s/p stents), PPM, hx of MI (s/p CABG), presents to the ED with shortness of breath and cough for the past 2 days. Admitted to medicine for sepsis with acute hypoxic respiratory failure 2/2 COVID PNA current SpO2 96% on 3L NC, and acute kidney injury.    INTERVAL HPI/OVERNIGHT EVENTS:   pt weaned off supplemental o2, states feeling better this AM  REVIEW OF SYSTEMS:  CONSTITUTIONAL: No fever, weight loss, or fatigue  RESPIRATORY: No cough, wheezing, chills or hemoptysis; No shortness of breath  CARDIOVASCULAR: No chest pain, palpitations, dizziness, or leg swelling  GASTROINTESTINAL: No abdominal pain. No nausea, vomiting, or hematemesis; No diarrhea or constipation. No melena or hematochezia.  GENITOURINARY: No dysuria or hematuria, urinary frequency  NEUROLOGICAL: No headaches, memory loss, loss of strength, numbness, or tremors  SKIN: No itching, burning, rashes, or lesions     MEDICATIONS  (STANDING):  aspirin enteric coated 81 milliGRAM(s) Oral daily  atorvastatin 80 milliGRAM(s) Oral at bedtime  dexAMETHasone  Injectable 6 milliGRAM(s) IV Push daily  heparin   Injectable 5000 Unit(s) SubCutaneous every 12 hours  losartan 50 milliGRAM(s) Oral daily  metoprolol tartrate 12.5 milliGRAM(s) Oral two times a day  remdesivir  IVPB   IV Intermittent   remdesivir  IVPB 100 milliGRAM(s) IV Intermittent every 24 hours  sodium chloride 0.9%. 1000 milliLiter(s) (50 mL/Hr) IV Continuous <Continuous>  tamsulosin 0.8 milliGRAM(s) Oral at bedtime    MEDICATIONS  (PRN):  acetaminophen     Tablet .. 650 milliGRAM(s) Oral every 6 hours PRN Temp greater or equal to 38C (100.4F), Mild Pain (1 - 3)  aluminum hydroxide/magnesium hydroxide/simethicone Suspension 30 milliLiter(s) Oral every 4 hours PRN Dyspepsia  benzocaine 15 mG/menthol 3.6 mG Lozenge 1 Lozenge Oral every 3 hours PRN Sore Throat  melatonin 3 milliGRAM(s) Oral at bedtime PRN Insomnia  ondansetron Injectable 4 milliGRAM(s) IV Push every 8 hours PRN Nausea and/or Vomiting      Vital Signs Last 24 Hrs  T(C): 36.7 (21 Oct 2022 14:04), Max: 36.7 (20 Oct 2022 20:13)  T(F): 98 (21 Oct 2022 14:04), Max: 98 (20 Oct 2022 20:13)  HR: 60 (21 Oct 2022 14:04) (52 - 67)  BP: 136/87 (21 Oct 2022 14:04) (136/87 - 153/88)  BP(mean): --  RR: 18 (21 Oct 2022 14:04) (18 - 20)  SpO2: 96% (21 Oct 2022 14:04) (96% - 99%)    Parameters below as of 21 Oct 2022 14:04  Patient On (Oxygen Delivery Method): room air        PHYSICAL EXAMINATION:  GENERAL: NAD  HEENT: Normocephalic;  conjunctivae and sclerae clear; moist mucous membranes;   NECK : supple  CHEST/LUNG: Clear to auscultation bilaterally with good air entry   HEART: S1 S2  regular; no murmurs, gallops or rubs  ABDOMEN: Soft, Nontender, Nondistended; Bowel sounds present  EXTREMITIES: no cyanosis; no edema; no calf tenderness  SKIN: warm and dry; no rash  NERVOUS SYSTEM:  Awake and alert; Oriented  to place, person and time ; no new deficits                        15.1   7.71  )-----------( 109      ( 21 Oct 2022 06:17 )             45.6     10-21    142  |  111<H>  |  34<H>  ----------------------------<  99  4.3   |  22  |  1.21    Ca    8.8      21 Oct 2022 06:17  Phos  3.8     10-21  Mg     2.1     10-21    TPro  6.5  /  Alb  3.2<L>  /  TBili  2.4<H>  /  DBili  0.6<H>  /  AST  27  /  ALT  21  /  AlkPhos  67  10-20    LIVER FUNCTIONS - ( 20 Oct 2022 08:02 )  Alb: 3.2 g/dL / Pro: 6.5 g/dL / ALK PHOS: 67 U/L / ALT: 21 U/L DA / AST: 27 U/L / GGT: x               PT/INR - ( 21 Oct 2022 06:17 )   PT: 14.4 sec;   INR: 1.21 ratio             I&O's Summary          CAPILLARY BLOOD GLUCOSE      RADIOLOGY & ADDITIONAL TESTS:

## 2022-10-21 NOTE — PROGRESS NOTE ADULT - PROBLEM SELECTOR PLAN 5
continue atorvastatin 80mg at HS

## 2022-10-21 NOTE — PROGRESS NOTE ADULT - PROVIDER SPECIALTY LIST ADULT
Infectious Disease
Internal Medicine
Infectious Disease
Cardiology
Internal Medicine
Cardiology
Internal Medicine

## 2022-10-21 NOTE — PROGRESS NOTE ADULT - PROBLEM SELECTOR PLAN 6
monitor BP  continue metoprolol with parameters

## 2022-10-21 NOTE — PROGRESS NOTE ADULT - PROBLEM SELECTOR PROBLEM 3
Acute kidney injury (nontraumatic)

## 2022-10-21 NOTE — PROGRESS NOTE ADULT - ASSESSMENT
COVID - 19 infection        Plan - Cont Decadron 6mgs iv q24hrs  Cont Remdesivir 100mgs iv q24hrs from tomorrow x 3 days more.  DC planning  if going home can go on Decadron 6mgs po qd x 5-6 days more.

## 2022-10-21 NOTE — PROGRESS NOTE ADULT - PROBLEM SELECTOR PROBLEM 1
Sepsis with acute hypoxic respiratory failure without septic shock

## 2022-10-21 NOTE — DISCHARGE NOTE PROVIDER - CARE PROVIDER_API CALL
Robbie Kohli  INTERNAL MEDICINE  [id number] [place] [address]  Phone: [phone]  Fax: [phone]  Follow Up Time:

## 2022-10-21 NOTE — DISCHARGE NOTE PROVIDER - NSDCCPCAREPLAN_GEN_ALL_CORE_FT
PRINCIPAL DISCHARGE DIAGNOSIS  Diagnosis: Pneumonia due to COVID-19 virus  Assessment and Plan of Treatment: You were admitted to the hospital with acute respiratory hypoxic failure 2/2 covid-19 pneumonia requiring supplemental oxygen. You were started on steroids intravenously and subsequently started on antiviral (remdesivir) and you were able to be weaned off supplemental oxygen. You are recommended to follow up with your primary care provider within 1-2 weeks of hospital discharge.      SECONDARY DISCHARGE DIAGNOSES  Diagnosis: Acute respiratory failure with hypoxia  Assessment and Plan of Treatment: You were admitted to the hospital with acute respiratory hypoxic failure 2/2 covid-19 pneumonia requiring supplemental oxygen. You were started on steroids intravenously and subsequently started on antiviral (remdesivir) and you were able to be weaned off supplemental oxygen. You are recommended to follow up with your primary care provider within 1-2 weeks of hospital discharge.    Diagnosis: CAD (coronary artery disease)  Assessment and Plan of Treatment: You have history of coronary artery disease and were continued on your home dose of atorvastatin. You are recommended to have a transthoracic echocardiogram as an outpatient. You are recommended to follow up with your primary care provider within 1-2 weeks of hospital discharge.    Diagnosis: BPH (benign prostatic hyperplasia)  Assessment and Plan of Treatment: You have history of hypertension and were continued on your home dose of tamsulosin. You are recommended to follow up with your primary care provider within 1-2 weeks of hospital discharge.    Diagnosis: HTN (hypertension)  Assessment and Plan of Treatment: You have history of hypertension and were continued on your home dose of metoprolol. You are recommended to have a transthoracic echocardiogram as an outpatient. You are recommended to follow up with your primary care provider within 1-2 weeks of hospital discharge.    Diagnosis: Acute kidney injury (nontraumatic)  Assessment and Plan of Treatment: You were admitted to the hospital with acute kidney injury which improved with intravenous hydration. You are recommended to follow up with your primary care provider within 1-2 weeks of hospital discharge     23-Jul-2022 12:39 PRINCIPAL DISCHARGE DIAGNOSIS  Diagnosis: Pneumonia due to COVID-19 virus  Assessment and Plan of Treatment: You were admitted to the hospital with acute respiratory hypoxic failure 2/2 covid-19 pneumonia requiring supplemental oxygen. You were started on steroids intravenously and subsequently started on antiviral (remdesivir) and you were able to be weaned off supplemental oxygen. You are recommended to follow up with your primary care provider within 1-2 weeks of hospital discharge.  Please take the Decadron 6mg by mouth once daily for the next 4 days starting tomorrow.      SECONDARY DISCHARGE DIAGNOSES  Diagnosis: HTN (hypertension)  Assessment and Plan of Treatment: You have history of hypertension and were continued on your home dose of metoprolol. You are recommended to have a transthoracic echocardiogram as an outpatient. You are recommended to follow up with your primary care provider within 1-2 weeks of hospital discharge.    Diagnosis: CAD (coronary artery disease)  Assessment and Plan of Treatment: You have history of coronary artery disease and were continued on your home dose of atorvastatin. You are recommended to have a transthoracic echocardiogram as an outpatient. You are recommended to follow up with your primary care provider within 1-2 weeks of hospital discharge.    Diagnosis: BPH (benign prostatic hyperplasia)  Assessment and Plan of Treatment: You have history of hypertension and were continued on your home dose of tamsulosin. You are recommended to follow up with your primary care provider within 1-2 weeks of hospital discharge.    Diagnosis: Acute kidney injury (nontraumatic)  Assessment and Plan of Treatment: You were admitted to the hospital with acute kidney injury which improved with intravenous hydration. You are recommended to follow up with your primary care provider within 1-2 weeks of hospital discharge    Diagnosis: Acute respiratory failure with hypoxia  Assessment and Plan of Treatment: You were admitted to the hospital with acute respiratory hypoxic failure 2/2 covid-19 pneumonia requiring supplemental oxygen. You were started on steroids intravenously and subsequently started on antiviral (remdesivir) and you were able to be weaned off supplemental oxygen. You are recommended to follow up with your primary care provider within 1-2 weeks of hospital discharge.

## 2022-10-21 NOTE — PROGRESS NOTE ADULT - SUBJECTIVE AND OBJECTIVE BOX
77y Male    Meds:  remdesivir  IVPB   IV Intermittent   remdesivir  IVPB 100 milliGRAM(s) IV Intermittent every 24 hours    Allergies    penicillin (Other)    Intolerances        VITALS:  Vital Signs Last 24 Hrs  T(C): 36.7 (21 Oct 2022 14:04), Max: 36.7 (20 Oct 2022 20:13)  T(F): 98 (21 Oct 2022 14:04), Max: 98 (20 Oct 2022 20:13)  HR: 60 (21 Oct 2022 14:04) (52 - 63)  BP: 136/87 (21 Oct 2022 14:04) (136/87 - 145/82)  BP(mean): --  RR: 18 (21 Oct 2022 14:04) (18 - 18)  SpO2: 96% (21 Oct 2022 14:04) (96% - 99%)    Parameters below as of 21 Oct 2022 14:04  Patient On (Oxygen Delivery Method): room air        LABS/DIAGNOSTIC TESTS:                          15.1   7.71  )-----------( 109      ( 21 Oct 2022 06:17 )             45.6         10-21    142  |  111<H>  |  34<H>  ----------------------------<  99  4.3   |  22  |  1.21    Ca    8.8      21 Oct 2022 06:17  Phos  3.8     10-21  Mg     2.1     10-21    TPro  6.5  /  Alb  3.2<L>  /  TBili  2.4<H>  /  DBili  0.6<H>  /  AST  27  /  ALT  21  /  AlkPhos  67  10-20      LIVER FUNCTIONS - ( 20 Oct 2022 08:02 )  Alb: 3.2 g/dL / Pro: 6.5 g/dL / ALK PHOS: 67 U/L / ALT: 21 U/L DA / AST: 27 U/L / GGT: x             CULTURES:       RADIOLOGY:      ROS:  [  ] UNABLE TO ELICIT 77y Male who is doing extremely well, he has no SOB even off oxygen, he still has a cough but is improving and he feels stronger. He has no fevers , chills or diarrhea. He has a good appetite and is eating well.    Meds:  remdesivir  IVPB   IV Intermittent   remdesivir  IVPB 100 milliGRAM(s) IV Intermittent every 24 hours    Allergies    penicillin (Other)    Intolerances        VITALS:  Vital Signs Last 24 Hrs  T(C): 36.7 (21 Oct 2022 14:04), Max: 36.7 (20 Oct 2022 20:13)  T(F): 98 (21 Oct 2022 14:04), Max: 98 (20 Oct 2022 20:13)  HR: 60 (21 Oct 2022 14:04) (52 - 63)  BP: 136/87 (21 Oct 2022 14:04) (136/87 - 145/82)  BP(mean): --  RR: 18 (21 Oct 2022 14:04) (18 - 18)  SpO2: 96% (21 Oct 2022 14:04) (96% - 99%)    Parameters below as of 21 Oct 2022 14:04  Patient On (Oxygen Delivery Method): room air        LABS/DIAGNOSTIC TESTS:                          15.1   7.71  )-----------( 109      ( 21 Oct 2022 06:17 )             45.6         10-21    142  |  111<H>  |  34<H>  ----------------------------<  99  4.3   |  22  |  1.21    Ca    8.8      21 Oct 2022 06:17  Phos  3.8     10-21  Mg     2.1     10-21    TPro  6.5  /  Alb  3.2<L>  /  TBili  2.4<H>  /  DBili  0.6<H>  /  AST  27  /  ALT  21  /  AlkPhos  67  10-20      LIVER FUNCTIONS - ( 20 Oct 2022 08:02 )  Alb: 3.2 g/dL / Pro: 6.5 g/dL / ALK PHOS: 67 U/L / ALT: 21 U/L DA / AST: 27 U/L / GGT: x             CULTURES:       RADIOLOGY:      ROS:  [  ] UNABLE TO ELICIT

## 2022-10-22 ENCOUNTER — TRANSCRIPTION ENCOUNTER (OUTPATIENT)
Age: 77
End: 2022-10-22

## 2022-10-22 VITALS
OXYGEN SATURATION: 98 % | HEART RATE: 60 BPM | DIASTOLIC BLOOD PRESSURE: 80 MMHG | SYSTOLIC BLOOD PRESSURE: 133 MMHG | RESPIRATION RATE: 18 BRPM

## 2022-10-22 LAB
ANION GAP SERPL CALC-SCNC: 9 MMOL/L — SIGNIFICANT CHANGE UP (ref 5–17)
BUN SERPL-MCNC: 39 MG/DL — HIGH (ref 7–18)
CALCIUM SERPL-MCNC: 8.7 MG/DL — SIGNIFICANT CHANGE UP (ref 8.4–10.5)
CHLORIDE SERPL-SCNC: 111 MMOL/L — HIGH (ref 96–108)
CO2 SERPL-SCNC: 22 MMOL/L — SIGNIFICANT CHANGE UP (ref 22–31)
CREAT SERPL-MCNC: 1.14 MG/DL — SIGNIFICANT CHANGE UP (ref 0.5–1.3)
EGFR: 66 ML/MIN/1.73M2 — SIGNIFICANT CHANGE UP
GLUCOSE SERPL-MCNC: 98 MG/DL — SIGNIFICANT CHANGE UP (ref 70–99)
HCT VFR BLD CALC: 46.1 % — SIGNIFICANT CHANGE UP (ref 39–50)
HGB BLD-MCNC: 14.9 G/DL — SIGNIFICANT CHANGE UP (ref 13–17)
INR BLD: 1.31 RATIO — HIGH (ref 0.88–1.16)
MCHC RBC-ENTMCNC: 29 PG — SIGNIFICANT CHANGE UP (ref 27–34)
MCHC RBC-ENTMCNC: 32.3 GM/DL — SIGNIFICANT CHANGE UP (ref 32–36)
MCV RBC AUTO: 89.9 FL — SIGNIFICANT CHANGE UP (ref 80–100)
NRBC # BLD: 0 /100 WBCS — SIGNIFICANT CHANGE UP (ref 0–0)
PLATELET # BLD AUTO: 114 K/UL — LOW (ref 150–400)
POTASSIUM SERPL-MCNC: 4 MMOL/L — SIGNIFICANT CHANGE UP (ref 3.5–5.3)
POTASSIUM SERPL-SCNC: 4 MMOL/L — SIGNIFICANT CHANGE UP (ref 3.5–5.3)
PROTHROM AB SERPL-ACNC: 15.6 SEC — HIGH (ref 10.5–13.4)
RBC # BLD: 5.13 M/UL — SIGNIFICANT CHANGE UP (ref 4.2–5.8)
RBC # FLD: 13.6 % — SIGNIFICANT CHANGE UP (ref 10.3–14.5)
SODIUM SERPL-SCNC: 142 MMOL/L — SIGNIFICANT CHANGE UP (ref 135–145)
WBC # BLD: 8.98 K/UL — SIGNIFICANT CHANGE UP (ref 3.8–10.5)
WBC # FLD AUTO: 8.98 K/UL — SIGNIFICANT CHANGE UP (ref 3.8–10.5)

## 2022-10-22 RX ORDER — TAMSULOSIN HYDROCHLORIDE 0.4 MG/1
2 CAPSULE ORAL
Qty: 60 | Refills: 0
Start: 2022-10-22 | End: 2022-11-20

## 2022-10-22 RX ADMIN — HEPARIN SODIUM 5000 UNIT(S): 5000 INJECTION INTRAVENOUS; SUBCUTANEOUS at 06:06

## 2022-10-22 RX ADMIN — Medication 81 MILLIGRAM(S): at 13:02

## 2022-10-22 RX ADMIN — REMDESIVIR 500 MILLIGRAM(S): 5 INJECTION INTRAVENOUS at 13:02

## 2022-10-22 RX ADMIN — LOSARTAN POTASSIUM 50 MILLIGRAM(S): 100 TABLET, FILM COATED ORAL at 06:06

## 2022-10-22 RX ADMIN — Medication 12.5 MILLIGRAM(S): at 06:06

## 2022-10-22 RX ADMIN — Medication 6 MILLIGRAM(S): at 06:08

## 2022-10-22 NOTE — DISCHARGE NOTE NURSING/CASE MANAGEMENT/SOCIAL WORK - PATIENT PORTAL LINK FT
You can access the FollowMyHealth Patient Portal offered by [facility] by registering at the following website: [email or web address]. By joining [facility]’s FollowMyHealth portal, you will also be able to view your health information using other applications (apps) compatible with our system.

## 2022-10-22 NOTE — DISCHARGE NOTE NURSING/CASE MANAGEMENT/SOCIAL WORK - NSDCVIVACCINE_GEN_ALL_CORE_FT
Tdap; 28-Feb-2019 18:15; Heather Gooden (RN); [employer]; [id number] (Exp. Date: 02-Nov-2019); IntraMuscular; Deltoid Left.; 0.5 milliLiter(s); VIS (VIS Published: 09-May-2013, VIS Presented: 28-Feb-2019);

## 2022-10-22 NOTE — DISCHARGE NOTE NURSING/CASE MANAGEMENT/SOCIAL WORK - NSDCPEFALRISK_GEN_ALL_CORE
For information on Fall & Injury Prevention, visit: [email or web address] OR  [email or web address] OR  https://www.cdc.gov/steadi/patient.html

## 2022-10-23 RX ORDER — METOPROLOL TARTRATE 50 MG
1 TABLET ORAL
Qty: 0 | Refills: 0 | DISCHARGE

## 2022-10-23 RX ORDER — TAMSULOSIN HYDROCHLORIDE 0.4 MG/1
2 CAPSULE ORAL
Qty: 60 | Refills: 0
Start: 2022-10-23 | End: 2022-11-21

## 2022-10-23 RX ORDER — DEXAMETHASONE 0.5 MG/5ML
1 ELIXIR ORAL
Qty: 4 | Refills: 0
Start: 2022-10-23 | End: 2022-10-26

## 2022-11-08 PROCEDURE — 99285 EMERGENCY DEPT VISIT HI MDM: CPT | Mod: 25

## 2022-11-08 PROCEDURE — 93005 ELECTROCARDIOGRAM TRACING: CPT

## 2022-11-08 PROCEDURE — 83615 LACTATE (LD) (LDH) ENZYME: CPT

## 2022-11-08 PROCEDURE — 71046 X-RAY EXAM CHEST 2 VIEWS: CPT

## 2022-11-08 PROCEDURE — 84100 ASSAY OF PHOSPHORUS: CPT

## 2022-11-08 PROCEDURE — 80048 BASIC METABOLIC PNL TOTAL CA: CPT

## 2022-11-08 PROCEDURE — 85652 RBC SED RATE AUTOMATED: CPT

## 2022-11-08 PROCEDURE — 80076 HEPATIC FUNCTION PANEL: CPT

## 2022-11-08 PROCEDURE — 85027 COMPLETE CBC AUTOMATED: CPT

## 2022-11-08 PROCEDURE — 86140 C-REACTIVE PROTEIN: CPT

## 2022-11-08 PROCEDURE — 83880 ASSAY OF NATRIURETIC PEPTIDE: CPT

## 2022-11-08 PROCEDURE — 84145 PROCALCITONIN (PCT): CPT

## 2022-11-08 PROCEDURE — 0225U NFCT DS DNA&RNA 21 SARSCOV2: CPT

## 2022-11-08 PROCEDURE — 85379 FIBRIN DEGRADATION QUANT: CPT

## 2022-11-08 PROCEDURE — 82728 ASSAY OF FERRITIN: CPT

## 2022-11-08 PROCEDURE — 83735 ASSAY OF MAGNESIUM: CPT

## 2022-11-08 PROCEDURE — 86803 HEPATITIS C AB TEST: CPT

## 2022-11-08 PROCEDURE — 85025 COMPLETE CBC W/AUTO DIFF WBC: CPT

## 2022-11-08 PROCEDURE — 85610 PROTHROMBIN TIME: CPT

## 2022-11-08 PROCEDURE — 36415 COLL VENOUS BLD VENIPUNCTURE: CPT

## 2022-11-08 PROCEDURE — 84484 ASSAY OF TROPONIN QUANT: CPT

## 2022-11-08 PROCEDURE — 80053 COMPREHEN METABOLIC PANEL: CPT

## 2023-02-06 ENCOUNTER — APPOINTMENT (OUTPATIENT)
Dept: ELECTROPHYSIOLOGY | Facility: CLINIC | Age: 78
End: 2023-02-06

## 2023-02-10 ENCOUNTER — NON-APPOINTMENT (OUTPATIENT)
Age: 78
End: 2023-02-10

## 2023-02-10 ENCOUNTER — APPOINTMENT (OUTPATIENT)
Dept: CARDIOLOGY | Facility: CLINIC | Age: 78
End: 2023-02-10
Payer: MEDICARE

## 2023-02-10 VITALS
SYSTOLIC BLOOD PRESSURE: 100 MMHG | HEART RATE: 50 BPM | DIASTOLIC BLOOD PRESSURE: 60 MMHG | BODY MASS INDEX: 21.28 KG/M2 | OXYGEN SATURATION: 97 % | WEIGHT: 124 LBS

## 2023-02-10 DIAGNOSIS — I10 ESSENTIAL (PRIMARY) HYPERTENSION: ICD-10-CM

## 2023-02-10 PROCEDURE — 99214 OFFICE O/P EST MOD 30 MIN: CPT

## 2023-02-10 NOTE — DISCUSSION/SUMMARY
[FreeTextEntry1] :  1.  Decrease losartan to 25 mg a day\par  2.  Follow-up in 3 months 2D echo at the same visit.\par \par 3.  Presently the patient is hemodynamically stable just his blood pressure is on the lower side and he is relatively asymptomatic

## 2023-02-10 NOTE — HISTORY OF PRESENT ILLNESS
[FreeTextEntry1] : Norma is 77 years old with a long history of chronic ischemic heart disease.  He status post coronary bypass surgery many years ago and status post pacemaker single lead with a change in battery several years ago\par \par He presented sometime in 2018 with an acute MI unstable angina underwent catheterization and had stents placed\par He actually presented with an acute inferior wall MI.  Catheterization revealed that the CORBY to the LAD was widely patent, SVG to OM1 was patent with a distal anastomotic lesion of 90% and the SVG to the right coronary was totally occluded.  The SVG to the right coronary was considered to the culprit lesion.  I was unable to recanalize the right coronary graft despite easy ability to cross the lesion and thrombectomy.  He subsequently returned and had PTCI of the SVG to OM1\par \par 2D echo at a later date revealed normal LV function and a subsequent nuclear stress test revealed an area of inferior infarct versus attenuation but no significant ischemia\par \par His pacemaker is usually checked at [facility] but he has not had a check for several years\par \par The patient has not seen me for over 2 years because of COVID-19.  He does follow with his internist Dr. Carrizales.\par \par He denies shortness of breath, he denies dizziness or syncope, he denies chest pain.\par He has reasonable exercise tolerance but on occasion feels somewhat weak and tired.  He denies edema orthopnea PND\par \par EKG  revealed probable sinus rhythm possible underlying complete heart block with pacing at approximately 50 beats a minute.  His own heart rate does increase with activity\par \par  visit  February 10, 2023: I have not seen the patient since 2021.  Apparently in January 2022 he was admitted with weakness dizziness change in speech and was felt to possibly have a TIA.\par \par   His pacemaker is checked regularly and is functioning normally.  He does have periods of complete heart block alternating with sinus rhythm first-degree AV block\par \par   In October 2022, he was admitted to  [facility] with shortness of breath but he was positive for COVID-19.  He had no chest pain his renal function was normal at the time\par \par   He now returns overall feeling well with perhaps some mild shortness of breath with exertion no chest pain no dizziness or syncope.\par \par   Medications: Aspirin 81 atorvastatin 80 losartan 50 metoprolol 12.5 twice daily multivitamins vitamin D3\par \par \par

## 2023-02-10 NOTE — PHYSICAL EXAM
[Well Developed] : well developed [Well Nourished] : well nourished [Normal Conjunctiva] : normal conjunctiva [No Carotid Bruit] : no carotid bruit [Normal S1, S2] : normal S1, S2 [Clear Lung Fields] : clear lung fields [No Respiratory Distress] : no respiratory distress  [Soft] : abdomen soft [Non Tender] : non-tender [No Edema] : no edema [Moves all extremities] : moves all extremities [No Murmur] : no murmur [Alert and Oriented] : alert and oriented [de-identified] : Elderly and thin [de-identified] :  S1-S2 rhythm regular no S3 no murmur

## 2023-02-10 NOTE — ASSESSMENT
[FreeTextEntry1] : Norma Frost 76 years old chronic ischemic heart disease status post bypass surgery status post stents in 2018 status post pacemaker with change in battery several years ago presently asymptomatic without chest pain chest pressure or shortness of breath dizziness or syncope.  He is overall feeling well\par \par  the patient had COVID-19 in October 2022.  He has not had an echocardiogram for some time.  His pacemaker is functioning normally and he is relatively asymptomatic without angina or overt CHF\par \par  his blood pressure runs on the lower side and presently he is on metoprolol 12.5 twice daily and losartan 50\par \par 1.  Status post coronary bypass surgery- no activity\par 2.  Status post inferior wall MI and unsuccessful recanalization of the SVG to the PDA\par 3.  Status post PTCI of the SVG to OM1 distal anastomosis\par 4.  Status post pacemaker functioning normally followed at [facility]\par  5.  Status post COVID-19\par  6.  Intermittent dizziness blood pressure is on the lower side\par \par Overall patient is stable .   however, his blood pressure is on the lower side on losartan 50

## 2023-02-10 NOTE — REASON FOR VISIT
[FreeTextEntry1] : Bhanu Webberkendy 77 years old was seen in follow-up cardiac consultation on February 10, 2023.  I last saw him in 2021

## 2023-03-26 NOTE — ED PROVIDER NOTE - NS ED MD DISPO DISCHARGE CCDA
Problem: Potential for Falls  Goal: Patient will remain free of falls  Description: INTERVENTIONS:  - Educate patient/family on patient safety including physical limitations  - Instruct patient to call for assistance with activity   - Consult OT/PT to assist with strengthening/mobility   - Keep Call bell within reach  - Keep bed low and locked with side rails adjusted as appropriate  - Keep care items and personal belongings within reach  - Initiate and maintain comfort rounds  - Make Fall Risk Sign visible to staff  - Offer Toileting every 2 Hours, in advance of need  - Initiate/Maintain bed alarm  - Obtain necessary fall risk management equipment:   - Apply yellow socks and bracelet for high fall risk patients  - Consider moving patient to room near nurses station  Outcome: Progressing     Problem: Nutrition/Hydration-ADULT  Goal: Nutrient/Hydration intake appropriate for improving, restoring or maintaining nutritional needs  Description: Monitor and assess patient's nutrition/hydration status for malnutrition  Collaborate with interdisciplinary team and initiate plan and interventions as ordered  Monitor patient's weight and dietary intake as ordered or per policy  Utilize nutrition screening tool and intervene as necessary  Determine patient's food preferences and provide high-protein, high-caloric foods as appropriate       INTERVENTIONS:  - Monitor oral intake, urinary output, labs, and treatment plans  - Assess nutrition and hydration status and recommend course of action  - Evaluate amount of meals eaten  - Assist patient with eating if necessary   - Allow adequate time for meals  - Recommend/ encourage appropriate diets, oral nutritional supplements, and vitamin/mineral supplements  - Order, calculate, and assess calorie counts as needed  - Recommend, monitor, and adjust tube feedings and TPN/PPN based on assessed needs  - Assess need for intravenous fluids  - Provide specific nutrition/hydration education as appropriate  - Include patient/family/caregiver in decisions related to nutrition  Outcome: Progressing     Problem: MOBILITY - ADULT  Goal: Maintain or return to baseline ADL function  Description: INTERVENTIONS:  -  Assess patient's ability to carry out ADLs; assess patient's baseline for ADL function and identify physical deficits which impact ability to perform ADLs (bathing, care of mouth/teeth, toileting, grooming, dressing, etc )  - Assess/evaluate cause of self-care deficits   - Assess range of motion  - Assess patient's mobility; develop plan if impaired  - Assess patient's need for assistive devices and provide as appropriate  - Encourage maximum independence but intervene and supervise when necessary  - Involve family in performance of ADLs  - Assess for home care needs following discharge   - Consider OT consult to assist with ADL evaluation and planning for discharge  - Provide patient education as appropriate  Outcome: Progressing  Goal: Maintains/Returns to pre admission functional level  Description: INTERVENTIONS:  - Perform BMAT or MOVE assessment daily    - Set and communicate daily mobility goal to care team and patient/family/caregiver  - Collaborate with rehabilitation services on mobility goals if consulted  - Perform Range of Motion 4 times a day  - Reposition patient every 2 hours    - Dangle patient 3 times a day  - Stand patient 3 times a day  - Ambulate patient 3 times a day  - Out of bed to chair 3 times a day   - Out of bed for meals  3 times a day  - Out of bed for toileting  - Record patient progress and toleration of activity level   Outcome: Progressing     Problem: Prexisting or High Potential for Compromised Skin Integrity  Goal: Skin integrity is maintained or improved  Description: INTERVENTIONS:  - Identify patients at risk for skin breakdown  - Assess and monitor skin integrity  - Assess and monitor nutrition and hydration status  - Monitor labs   - Assess for incontinence   - Turn and reposition patient  - Assist with mobility/ambulation  - Relieve pressure over bony prominences  - Avoid friction and shearing  - Provide appropriate hygiene as needed including keeping skin clean and dry  - Evaluate need for skin moisturizer/barrier cream  - Collaborate with interdisciplinary team   - Patient/family teaching  - Consider wound care consult   Outcome: Progressing     Problem: PAIN - ADULT  Goal: Verbalizes/displays adequate comfort level or baseline comfort level  Description: Interventions:  - Encourage patient to monitor pain and request assistance  - Assess pain using appropriate pain scale  - Administer analgesics based on type and severity of pain and evaluate response  - Implement non-pharmacological measures as appropriate and evaluate response  - Consider cultural and social influences on pain and pain management  - Notify physician/advanced practitioner if interventions unsuccessful or patient reports new pain  Outcome: Progressing Patient/Caregiver provided printed discharge information.

## 2023-04-24 NOTE — ED ADULT NURSE NOTE - CAS DISCH CONDITION
Stable Azathioprine Counseling:  I discussed with the patient the risks of azathioprine including but not limited to myelosuppression, immunosuppression, hepatotoxicity, lymphoma, and infections.  The patient understands that monitoring is required including baseline LFTs, Creatinine, possible TPMP genotyping and weekly CBCs for the first month and then every 2 weeks thereafter.  The patient verbalized understanding of the proper use and possible adverse effects of azathioprine.  All of the patient's questions and concerns were addressed.

## 2023-06-09 ENCOUNTER — APPOINTMENT (OUTPATIENT)
Dept: CARDIOLOGY | Facility: CLINIC | Age: 78
End: 2023-06-09
Payer: MEDICARE

## 2023-06-09 VITALS
BODY MASS INDEX: 21.8 KG/M2 | OXYGEN SATURATION: 98 % | SYSTOLIC BLOOD PRESSURE: 106 MMHG | HEART RATE: 50 BPM | DIASTOLIC BLOOD PRESSURE: 80 MMHG | WEIGHT: 127 LBS

## 2023-06-09 DIAGNOSIS — Z95.5 PRESENCE OF CORONARY ANGIOPLASTY IMPLANT AND GRAFT: ICD-10-CM

## 2023-06-09 DIAGNOSIS — I49.5 SICK SINUS SYNDROME: ICD-10-CM

## 2023-06-09 DIAGNOSIS — E78.5 HYPERLIPIDEMIA, UNSPECIFIED: ICD-10-CM

## 2023-06-09 DIAGNOSIS — Z95.0 PRESENCE OF CARDIAC PACEMAKER: ICD-10-CM

## 2023-06-09 PROCEDURE — 99214 OFFICE O/P EST MOD 30 MIN: CPT

## 2023-06-09 PROCEDURE — 93306 TTE W/DOPPLER COMPLETE: CPT

## 2023-06-09 NOTE — ASSESSMENT
[FreeTextEntry1] : Norma Frost 77 years old chronic ischemic heart disease status post bypass surgery status post stents in 2018 status post pacemaker with change in battery several years ago presently asymptomatic without chest pain chest pressure or shortness of breath dizziness or syncope.  He is overall feeling well\par \par  the patient had COVID-19 in October 2022.  He has not had an echocardiogram for some time.  His pacemaker is functioning normally and he is relatively asymptomatic without angina or overt CHF\par \par  his blood pressure runs on the lower side and presently he is on metoprolol 12.5 twice daily and losartan  probably 25\par \par 1.  Status post coronary bypass surgery- no activity\par 2.  Status post inferior wall MI and unsuccessful recanalization of the SVG to the PDA\par 3.  Status post PTCI of the SVG to OM1 distal anastomosis\par 4.  Status post pacemaker functioning normally followed [facility]\par  5.  Status post COVID-19\par  6.   blood pressure on the lower side even with the decrease in losartan to 25\par  7.  Preliminary 2D echo June 9023 normal LV\par \par Overall patient is stable .    he is asymptomatic

## 2023-06-09 NOTE — DISCUSSION/SUMMARY
[FreeTextEntry1] :  1.  Encouraged more p.o. intake of fluid\par  2. continue present regimen of medication.  Blood pressure remains on the low side losartan can probably be discontinued\par  3.  Follow-up again in 3 months\par \par  overall from a cardiac point of view he remains quite stable

## 2023-06-09 NOTE — PHYSICAL EXAM
[Well Developed] : well developed [Well Nourished] : well nourished [Normal Conjunctiva] : normal conjunctiva [No Carotid Bruit] : no carotid bruit [Normal S1, S2] : normal S1, S2 [No Murmur] : no murmur [Clear Lung Fields] : clear lung fields [No Respiratory Distress] : no respiratory distress  [Soft] : abdomen soft [Non Tender] : non-tender [No Edema] : no edema [Moves all extremities] : moves all extremities [Alert and Oriented] : alert and oriented [de-identified] : Elderly and thin [de-identified] :  S1-S2 rhythm regular no S3 no murmur

## 2023-06-09 NOTE — REASON FOR VISIT
[FreeTextEntry1] : Norma Frost 77 years old here for follow-up of his cardiac status.  He was seen on June 9 2023

## 2023-09-29 ENCOUNTER — APPOINTMENT (OUTPATIENT)
Dept: CARDIOLOGY | Facility: CLINIC | Age: 78
End: 2023-09-29

## 2023-10-13 NOTE — H&P ADULT - PROBLEM SELECTOR PLAN 4
10/13/23  Name: Alfredo Osler    : 1978    Sex: male    Age: 39 y.o. Subjective:  Chief Complaint: Patient is here for 6  mo ck  re      bp  chol weight  LFT   gout     6 mock    rfeel ok   gout  [place]  [place]  and  I told to do  for  3 wk only but  he called  Driving teritory   to  Proxino  Uric acid up     kadi start  aluprnool        Review of Systems   Constitutional: Negative. HENT: Negative. Eyes: Negative. Respiratory: Negative. Cardiovascular: Negative. Gastrointestinal: Negative. Endocrine: Negative. Genitourinary: Negative. Musculoskeletal: Negative. Skin: Negative. Allergic/Immunologic: Negative. Neurological: Negative. Hematological: Negative. Psychiatric/Behavioral: Negative. Current Outpatient Medications:     allopurinol (ZYLOPRIM) 100 MG tablet, Take 1 tablet by mouth daily Hold during gout attack then resume, Disp: 90 tablet, Rfl: 5    rosuvastatin (CRESTOR) 10 MG tablet, Take 1 tablet by mouth daily Replaces pravastatin, Disp: 90 tablet, Rfl: 5  Allergies   Allergen Reactions    Sulfa Antibiotics      Other reaction(s): SWELLING HIVES     Social History     Socioeconomic History    Marital status:      Spouse name: Not on file    Number of children: Not on file    Years of education: Not on file    Highest education level: Not on file   Occupational History    Not on file   Tobacco Use    Smoking status: Never    Smokeless tobacco: Never   Substance and Sexual Activity    Alcohol use: Never    Drug use: Never    Sexual activity: Not on file   Other Topics Concern    Not on file   Social History Narrative    FAMILY HISTORY OF CORONARY ARTERY DISEASE FATHER AND HALF BROTHER.    VERY MINIMAL RIGHT INGUINAL HERNIA. HISTORY OF SHINGLES IN THE PAST.     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 Clark at [facility]  -   Model[facility] - hx of PPM placed 11/21/05 by Dr. Clark at [facility]  - Model: Jeimy RENTERIARO IS-1  - primary team to interrogate PPM

## 2023-12-18 ENCOUNTER — EMERGENCY (EMERGENCY)
Facility: HOSPITAL | Age: 78
LOS: 1 days | Discharge: HOME | End: 2023-12-18
Attending: STUDENT IN AN ORGANIZED HEALTH CARE EDUCATION/TRAINING PROGRAM
Payer: COMMERCIAL

## 2023-12-18 VITALS
WEIGHT: 119.05 LBS | RESPIRATION RATE: 18 BRPM | DIASTOLIC BLOOD PRESSURE: 95 MMHG | TEMPERATURE: 99 F | SYSTOLIC BLOOD PRESSURE: 159 MMHG | OXYGEN SATURATION: 97 % | HEART RATE: 72 BPM

## 2023-12-18 DIAGNOSIS — Z95.0 PRESENCE OF CARDIAC PACEMAKER: Chronic | ICD-10-CM

## 2023-12-18 DIAGNOSIS — Z90.49 ACQUIRED ABSENCE OF OTHER SPECIFIED PARTS OF DIGESTIVE TRACT: Chronic | ICD-10-CM

## 2023-12-18 DIAGNOSIS — I25.10 ATHEROSCLEROTIC HEART DISEASE OF NATIVE CORONARY ARTERY WITHOUT ANGINA PECTORIS: Chronic | ICD-10-CM

## 2023-12-18 LAB
ALBUMIN SERPL ELPH-MCNC: 3.6 G/DL — SIGNIFICANT CHANGE UP (ref 3.5–5)
ALBUMIN SERPL ELPH-MCNC: 3.6 G/DL — SIGNIFICANT CHANGE UP (ref 3.5–5)
ALP SERPL-CCNC: 83 U/L — SIGNIFICANT CHANGE UP (ref 40–120)
ALP SERPL-CCNC: 83 U/L — SIGNIFICANT CHANGE UP (ref 40–120)
ALT FLD-CCNC: 28 U/L DA — SIGNIFICANT CHANGE UP (ref 10–60)
ALT FLD-CCNC: 28 U/L DA — SIGNIFICANT CHANGE UP (ref 10–60)
ANION GAP SERPL CALC-SCNC: 5 MMOL/L — SIGNIFICANT CHANGE UP (ref 5–17)
ANION GAP SERPL CALC-SCNC: 5 MMOL/L — SIGNIFICANT CHANGE UP (ref 5–17)
APTT BLD: 37.1 SEC — HIGH (ref 24.5–35.6)
APTT BLD: 37.1 SEC — HIGH (ref 24.5–35.6)
AST SERPL-CCNC: 32 U/L — SIGNIFICANT CHANGE UP (ref 10–40)
AST SERPL-CCNC: 32 U/L — SIGNIFICANT CHANGE UP (ref 10–40)
BASOPHILS # BLD AUTO: 0.05 K/UL — SIGNIFICANT CHANGE UP (ref 0–0.2)
BASOPHILS # BLD AUTO: 0.05 K/UL — SIGNIFICANT CHANGE UP (ref 0–0.2)
BASOPHILS NFR BLD AUTO: 0.9 % — SIGNIFICANT CHANGE UP (ref 0–2)
BASOPHILS NFR BLD AUTO: 0.9 % — SIGNIFICANT CHANGE UP (ref 0–2)
BILIRUB SERPL-MCNC: 3 MG/DL — HIGH (ref 0.2–1.2)
BILIRUB SERPL-MCNC: 3 MG/DL — HIGH (ref 0.2–1.2)
BUN SERPL-MCNC: 18 MG/DL — SIGNIFICANT CHANGE UP (ref 7–18)
BUN SERPL-MCNC: 18 MG/DL — SIGNIFICANT CHANGE UP (ref 7–18)
CALCIUM SERPL-MCNC: 9.4 MG/DL — SIGNIFICANT CHANGE UP (ref 8.4–10.5)
CALCIUM SERPL-MCNC: 9.4 MG/DL — SIGNIFICANT CHANGE UP (ref 8.4–10.5)
CHLORIDE SERPL-SCNC: 110 MMOL/L — HIGH (ref 96–108)
CHLORIDE SERPL-SCNC: 110 MMOL/L — HIGH (ref 96–108)
CO2 SERPL-SCNC: 26 MMOL/L — SIGNIFICANT CHANGE UP (ref 22–31)
CO2 SERPL-SCNC: 26 MMOL/L — SIGNIFICANT CHANGE UP (ref 22–31)
CREAT SERPL-MCNC: 1.37 MG/DL — HIGH (ref 0.5–1.3)
CREAT SERPL-MCNC: 1.37 MG/DL — HIGH (ref 0.5–1.3)
EGFR: 53 ML/MIN/1.73M2 — LOW
EGFR: 53 ML/MIN/1.73M2 — LOW
EOSINOPHIL # BLD AUTO: 0.01 K/UL — SIGNIFICANT CHANGE UP (ref 0–0.5)
EOSINOPHIL # BLD AUTO: 0.01 K/UL — SIGNIFICANT CHANGE UP (ref 0–0.5)
EOSINOPHIL NFR BLD AUTO: 0.2 % — SIGNIFICANT CHANGE UP (ref 0–6)
EOSINOPHIL NFR BLD AUTO: 0.2 % — SIGNIFICANT CHANGE UP (ref 0–6)
FLUAV AG NPH QL: SIGNIFICANT CHANGE UP
FLUAV AG NPH QL: SIGNIFICANT CHANGE UP
FLUBV AG NPH QL: SIGNIFICANT CHANGE UP
FLUBV AG NPH QL: SIGNIFICANT CHANGE UP
GAS PNL BLDV: SIGNIFICANT CHANGE UP
GAS PNL BLDV: SIGNIFICANT CHANGE UP
GLUCOSE SERPL-MCNC: 87 MG/DL — SIGNIFICANT CHANGE UP (ref 70–99)
GLUCOSE SERPL-MCNC: 87 MG/DL — SIGNIFICANT CHANGE UP (ref 70–99)
HCT VFR BLD CALC: 47.8 % — SIGNIFICANT CHANGE UP (ref 39–50)
HCT VFR BLD CALC: 47.8 % — SIGNIFICANT CHANGE UP (ref 39–50)
HGB BLD-MCNC: 15.4 G/DL — SIGNIFICANT CHANGE UP (ref 13–17)
HGB BLD-MCNC: 15.4 G/DL — SIGNIFICANT CHANGE UP (ref 13–17)
IMM GRANULOCYTES NFR BLD AUTO: 0.4 % — SIGNIFICANT CHANGE UP (ref 0–0.9)
IMM GRANULOCYTES NFR BLD AUTO: 0.4 % — SIGNIFICANT CHANGE UP (ref 0–0.9)
INR BLD: 1.14 RATIO — SIGNIFICANT CHANGE UP (ref 0.85–1.18)
INR BLD: 1.14 RATIO — SIGNIFICANT CHANGE UP (ref 0.85–1.18)
LYMPHOCYTES # BLD AUTO: 0.86 K/UL — LOW (ref 1–3.3)
LYMPHOCYTES # BLD AUTO: 0.86 K/UL — LOW (ref 1–3.3)
LYMPHOCYTES # BLD AUTO: 15.4 % — SIGNIFICANT CHANGE UP (ref 13–44)
LYMPHOCYTES # BLD AUTO: 15.4 % — SIGNIFICANT CHANGE UP (ref 13–44)
MCHC RBC-ENTMCNC: 29.3 PG — SIGNIFICANT CHANGE UP (ref 27–34)
MCHC RBC-ENTMCNC: 29.3 PG — SIGNIFICANT CHANGE UP (ref 27–34)
MCHC RBC-ENTMCNC: 32.2 GM/DL — SIGNIFICANT CHANGE UP (ref 32–36)
MCHC RBC-ENTMCNC: 32.2 GM/DL — SIGNIFICANT CHANGE UP (ref 32–36)
MCV RBC AUTO: 91 FL — SIGNIFICANT CHANGE UP (ref 80–100)
MCV RBC AUTO: 91 FL — SIGNIFICANT CHANGE UP (ref 80–100)
MONOCYTES # BLD AUTO: 0.65 K/UL — SIGNIFICANT CHANGE UP (ref 0–0.9)
MONOCYTES # BLD AUTO: 0.65 K/UL — SIGNIFICANT CHANGE UP (ref 0–0.9)
MONOCYTES NFR BLD AUTO: 11.6 % — SIGNIFICANT CHANGE UP (ref 2–14)
MONOCYTES NFR BLD AUTO: 11.6 % — SIGNIFICANT CHANGE UP (ref 2–14)
NEUTROPHILS # BLD AUTO: 3.99 K/UL — SIGNIFICANT CHANGE UP (ref 1.8–7.4)
NEUTROPHILS # BLD AUTO: 3.99 K/UL — SIGNIFICANT CHANGE UP (ref 1.8–7.4)
NEUTROPHILS NFR BLD AUTO: 71.5 % — SIGNIFICANT CHANGE UP (ref 43–77)
NEUTROPHILS NFR BLD AUTO: 71.5 % — SIGNIFICANT CHANGE UP (ref 43–77)
NRBC # BLD: 0 /100 WBCS — SIGNIFICANT CHANGE UP (ref 0–0)
NRBC # BLD: 0 /100 WBCS — SIGNIFICANT CHANGE UP (ref 0–0)
NT-PROBNP SERPL-SCNC: 1624 PG/ML — HIGH (ref 0–450)
NT-PROBNP SERPL-SCNC: 1624 PG/ML — HIGH (ref 0–450)
PLATELET # BLD AUTO: 96 K/UL — LOW (ref 150–400)
PLATELET # BLD AUTO: 96 K/UL — LOW (ref 150–400)
POTASSIUM SERPL-MCNC: 4.8 MMOL/L — SIGNIFICANT CHANGE UP (ref 3.5–5.3)
POTASSIUM SERPL-MCNC: 4.8 MMOL/L — SIGNIFICANT CHANGE UP (ref 3.5–5.3)
POTASSIUM SERPL-SCNC: 4.8 MMOL/L — SIGNIFICANT CHANGE UP (ref 3.5–5.3)
POTASSIUM SERPL-SCNC: 4.8 MMOL/L — SIGNIFICANT CHANGE UP (ref 3.5–5.3)
PROT SERPL-MCNC: 7.1 G/DL — SIGNIFICANT CHANGE UP (ref 6–8.3)
PROT SERPL-MCNC: 7.1 G/DL — SIGNIFICANT CHANGE UP (ref 6–8.3)
PROTHROM AB SERPL-ACNC: 12.9 SEC — SIGNIFICANT CHANGE UP (ref 9.5–13)
PROTHROM AB SERPL-ACNC: 12.9 SEC — SIGNIFICANT CHANGE UP (ref 9.5–13)
RBC # BLD: 5.25 M/UL — SIGNIFICANT CHANGE UP (ref 4.2–5.8)
RBC # BLD: 5.25 M/UL — SIGNIFICANT CHANGE UP (ref 4.2–5.8)
RBC # FLD: 14 % — SIGNIFICANT CHANGE UP (ref 10.3–14.5)
RBC # FLD: 14 % — SIGNIFICANT CHANGE UP (ref 10.3–14.5)
SARS-COV-2 RNA SPEC QL NAA+PROBE: SIGNIFICANT CHANGE UP
SARS-COV-2 RNA SPEC QL NAA+PROBE: SIGNIFICANT CHANGE UP
SODIUM SERPL-SCNC: 141 MMOL/L — SIGNIFICANT CHANGE UP (ref 135–145)
SODIUM SERPL-SCNC: 141 MMOL/L — SIGNIFICANT CHANGE UP (ref 135–145)
TROPONIN I, HIGH SENSITIVITY RESULT: 10.8 NG/L — SIGNIFICANT CHANGE UP
TROPONIN I, HIGH SENSITIVITY RESULT: 10.8 NG/L — SIGNIFICANT CHANGE UP
WBC # BLD: 5.58 K/UL — SIGNIFICANT CHANGE UP (ref 3.8–10.5)
WBC # BLD: 5.58 K/UL — SIGNIFICANT CHANGE UP (ref 3.8–10.5)
WBC # FLD AUTO: 5.58 K/UL — SIGNIFICANT CHANGE UP (ref 3.8–10.5)
WBC # FLD AUTO: 5.58 K/UL — SIGNIFICANT CHANGE UP (ref 3.8–10.5)

## 2023-12-18 PROCEDURE — 82330 ASSAY OF CALCIUM: CPT

## 2023-12-18 PROCEDURE — 71046 X-RAY EXAM CHEST 2 VIEWS: CPT | Mod: 26

## 2023-12-18 PROCEDURE — 36415 COLL VENOUS BLD VENIPUNCTURE: CPT

## 2023-12-18 PROCEDURE — 83605 ASSAY OF LACTIC ACID: CPT

## 2023-12-18 PROCEDURE — 80053 COMPREHEN METABOLIC PANEL: CPT

## 2023-12-18 PROCEDURE — 85730 THROMBOPLASTIN TIME PARTIAL: CPT

## 2023-12-18 PROCEDURE — 84132 ASSAY OF SERUM POTASSIUM: CPT

## 2023-12-18 PROCEDURE — 83880 ASSAY OF NATRIURETIC PEPTIDE: CPT

## 2023-12-18 PROCEDURE — 84484 ASSAY OF TROPONIN QUANT: CPT

## 2023-12-18 PROCEDURE — 85025 COMPLETE CBC W/AUTO DIFF WBC: CPT

## 2023-12-18 PROCEDURE — 99285 EMERGENCY DEPT VISIT HI MDM: CPT | Mod: 25

## 2023-12-18 PROCEDURE — 84295 ASSAY OF SERUM SODIUM: CPT

## 2023-12-18 PROCEDURE — 99285 EMERGENCY DEPT VISIT HI MDM: CPT

## 2023-12-18 PROCEDURE — 85610 PROTHROMBIN TIME: CPT

## 2023-12-18 PROCEDURE — 87637 SARSCOV2&INF A&B&RSV AMP PRB: CPT

## 2023-12-18 PROCEDURE — 71046 X-RAY EXAM CHEST 2 VIEWS: CPT

## 2023-12-18 PROCEDURE — 82803 BLOOD GASES ANY COMBINATION: CPT

## 2023-12-18 PROCEDURE — 93005 ELECTROCARDIOGRAM TRACING: CPT

## 2023-12-18 RX ORDER — ACETAMINOPHEN 500 MG
1000 TABLET ORAL ONCE
Refills: 0 | Status: COMPLETED | OUTPATIENT
Start: 2023-12-18 | End: 2023-12-18

## 2023-12-18 RX ORDER — ACETAMINOPHEN 500 MG
1000 TABLET ORAL ONCE
Refills: 0 | Status: DISCONTINUED | OUTPATIENT
Start: 2023-12-18 | End: 2023-12-18

## 2023-12-18 RX ADMIN — Medication 400 MILLIGRAM(S): at 16:47

## 2023-12-18 NOTE — ED PROVIDER NOTE - PROGRESS NOTE DETAILS
Mylene Lucas DO PGY-3  Patient re-assessed. Vitals stable. Pain has improved after interventions. Test results explained to patient and daughter.    Discussed the plan for discharge home with the patient. Advised return precautions for any alarming or worsening symptoms, or any other concerns. Recommended that the patient call their primary care doctor today, inform them of their visit to the ER, and to obtain repeat evaluation from their PCP in the next 1-3 days. Patient expresses understanding and agrees with the plan for follow up. At the time of discharge the patient remained stable, in no acute distress.

## 2023-12-18 NOTE — ED ADULT NURSE NOTE - NSICDXPASTMEDICALHX_GEN_ALL_CORE_FT
PAST MEDICAL HISTORY:  BPH (benign prostatic hypertrophy)     Coronary artery disease stents    CVA (cerebral vascular accident) 10-15 years ago -- No residual    Difficult intubation patient has notation with pacemaker information that he is a difficult intubation but no specifics given    Hyperlipidemia     Hypertension     Sinoatrial node dysfunction guidant pacemaker  [employer] SSIRO IS-1, Comp, 1198, [id number]  leads Bipolar 3.2 LP Passive RV 52, 59 cm, 4260, [id number]  implanted oon 11/21/05 by Dr. Clark at [facility]    Snoring SWATI precautions -- responds affirmatively to STOP BANG questionnaire -- admits to intermittent snoring; age > 50; gender, male; h/o htn    Vasovagal syncope      PAST MEDICAL HISTORY:  BPH (benign prostatic hypertrophy)     Coronary artery disease stents    CVA (cerebral vascular accident) 10-15 years ago -- No residual    Difficult intubation patient has notation with pacemaker information that he is a difficult intubation but no specifics given    Hyperlipidemia     Hypertension     Sinoatrial node dysfunction guidant pacemaker  [employer] I [employer]ra SSIRO IS-1, Comp, 1198, [id number]  leads Bipolar 3.2 LP Passive RV 52, 59 cm, 4260, [id number]  implanted oon 11/21/05 by Dr. Clark at [facility]    Snoring SWATI precautions -- responds affirmatively to STOP BANG questionnaire -- admits to intermittent snoring; age > 50; gender, male; h/o htn    Vasovagal syncope

## 2023-12-18 NOTE — ED PROVIDER NOTE - NSFOLLOWUPINSTRUCTIONS_ED_ALL_ED_FT
You were seen in the ER today for sore throat/viral syndrome.    Please follow up with your primary care doctor within 1 - 3 days. Call and let them know you were seen in the ER today.   Bring the results of your blood work and imaging with you to your appointment, if applicable.    For pain, please take acetaminophen 650 mg every 6 hours for pain.    Return to the ER for any worsening symptoms or concerns, including chest pain, shortness of breath, lightheadedness, weakness, or any other concerns.

## 2023-12-18 NOTE — ED PROVIDER NOTE - CLINICAL SUMMARY MEDICAL DECISION MAKING FREE TEXT BOX
Mylene Lucas DO PGY-3  HPI:   78-year-old male with history of hypertension, CVA without any residual deficits, CAD status post stents and CABG, pacemaker, presents to the ED with 2 days of cough, sore throat, productive sputum, generalized weakness and fatigue.  Patient reports painful swallowing due to sore throat.  Has wife at home with similar symptoms.  Also has intermittent chest pain that is nonexertional, nonpleuritic, without any associated diaphoresis or nausea.  Accompanied by daughter.    ROS:   Denies fever, chills, shortness of breath, abdominal pain, nausea, vomiting, diarrhea, dysuria, hematuria    PHYSICAL EXAM:  CONSTITUTIONAL: Well appearing, awake, alert, oriented to person, place, time/situation and in no apparent distress.  HEAD: Atraumatic, no step offs.  NECK: Supple, no meningismus.   EYES: Clear bilaterally, pupils equal, round and reactive to light.  ENMT: Airway patent, Nasal mucosa clear. Mouth with normal mucosa. Uvula is midline. Mild oropharyngeal erythema. No peritonsillar abscesses visualized. No exudates.  CARDIAC: Normal rate, regular rhythm. +S1/S2. No murmurs, rubs or gallops.  RESPIRATORY: Breathing unlabored. Breath sounds clear and equal bilaterally.  ABDOMEN:  Soft, nontender, nondistended. No rebound tenderness or guarding.  FLANK: No CVA tenderness B/L.  NEUROLOGICAL: Alert and oriented, no focal deficits, no motor or sensory deficits. CN2-12 intact. Sensation intact x4 extremities. Strength equal of upper and lower extremities B/L.   MSK: No clubbing, cyanosis, or edema.  SKIN: Skin warm and dry. No evidence of rashes or lesions.    MDM:   80-year-old male with history of CAD, hypertension, hyperlipidemia, CVA presents to the ED with 2 days of cough, productive sputum, sore throat, painful swallowing and intermittent nonexertional/nonpleuritic chest pain.  EKG sinus rhythm without ischemia, occasional PVCs appears unchanged from previous.  Differential includes but is not limited to viral syndrome, pneumonia, will additionally evaluate for myocarditis/atypical ACS (though less suspicious).   Plan to obtain labs, CXR, treat pain, re-assess.

## 2023-12-18 NOTE — ED PROVIDER NOTE - NSICDXPASTMEDICALHX_GEN_ALL_CORE_FT
PAST MEDICAL HISTORY:  BPH (benign prostatic hypertrophy)     Coronary artery disease stents    CVA (cerebral vascular accident) 10-15 years ago -- No residual    Difficult intubation patient has notation with pacemaker information that he is a difficult intubation but no specifics given    Hyperlipidemia     Hypertension     Sinoatrial node dysfunction guidant pacemaker  [employer] I Entra SSIRO IS-1, Comp, 1198, [id number]  leads Bipolar 3.2 LP Passive RV 52, 59 cm, 4260, [id number]  implanted oon 11/21/05 by Dr. Clark at [facility]    Snoring SWATI precautions -- responds affirmatively to STOP BANG questionnaire -- admits to intermittent snoring; age > 50; gender, male; h/o htn    Vasovagal syncope      PAST MEDICAL HISTORY:  BPH (benign prostatic hypertrophy)     Coronary artery disease stents    CVA (cerebral vascular accident) 10-15 years ago -- No residual    Difficult intubation patient has notation with pacemaker information that he is a difficult intubation but no specifics given    Hyperlipidemia     Hypertension     Sinoatrial node dysfunction guidant pacemaker  [employer] I Entra SSIRO IS-1, Comp, 1198, [id number]  leads Bipolar 3.2 LP Passive RV 52, 59 cm, 4260, [id number]  implanted oon 11/21/05 by Dr. Clark at [facility]    Snoring SWATI precautions -- responds affirmatively to STOP BANG questionnaire -- admits to intermittent snoring; age > 50; gender, male; h/o htn    Vasovagal syncope

## 2023-12-18 NOTE — ED PROVIDER NOTE - PATIENT PORTAL LINK FT
You can access the FollowMyHealth Patient Portal offered by [facility] by registering at the following website: [email or web address]. By joining [employer]’s FollowMyHealth portal, you will also be able to view your health information using other applications (apps) compatible with our system. You can access the FollowMyHealth Patient Portal offered by [facility] by registering at the following website: [email or web address]. By joining [employer]’s FollowMyHealth portal, you will also be able to view your health information using other applications (apps) compatible with our system.

## 2023-12-18 NOTE — ED PROVIDER NOTE - ATTENDING CONTRIBUTION TO CARE
78-year-old male with history of hypertension, CVA without any residual deficits, CAD status post stents and CABG, pacemaker, presents with flu-like symptoms x 2 days. Patient reports cough, congestion, fatigue, sore throat x 2 days. Denies any additional complaints. On exam, lungs CTAB, pt well appearing, well hydrated appearing. Likely infectious, possible viral syndrome. Will obtain labs, imaging r/o PNA with dispo pending workup.

## 2023-12-18 NOTE — ED ADULT NURSE NOTE - NSFALLHARMRISKINTERV_ED_ALL_ED
Assistance OOB with selected safe patient handling equipment if applicable/Assistance with ambulation/Communicate risk of Fall with Harm to all staff, patient, and family/Monitor gait and stability/Provide visual cue: red socks, yellow wristband, yellow gown, etc/Reinforce activity limits and safety measures with patient and family/Bed in lowest position, wheels locked, appropriate side rails in place/Call bell, personal items and telephone in reach/Instruct patient to call for assistance before getting out of bed/chair/stretcher/Non-slip footwear applied when patient is off stretcher/[place] to call system/Physically safe environment - no spills, clutter or unnecessary equipment/Purposeful Proactive Rounding/Room/bathroom lighting operational, light cord in reach Assistance OOB with selected safe patient handling equipment if applicable/Assistance with ambulation/Communicate risk of Fall with Harm to all staff, patient, and family/Monitor gait and stability/Provide visual cue: red socks, yellow wristband, yellow gown, etc/Reinforce activity limits and safety measures with patient and family/Bed in lowest position, wheels locked, appropriate side rails in place/Call bell, personal items and telephone in reach/Instruct patient to call for assistance before getting out of bed/chair/stretcher/Non-slip footwear applied when patient is off stretcher/[place] to call system/Physically safe environment - no spills, clutter or unnecessary equipment/Purposeful Proactive Rounding/Room/bathroom lighting operational, light cord in reach

## 2023-12-18 NOTE — ED ADULT NURSE NOTE - OBJECTIVE STATEMENT
Patient BIB daughter with c/o chest pqin and difficulty swallow since morning pain scale 6/10 with arrhythmia

## 2024-02-03 ENCOUNTER — EMERGENCY (EMERGENCY)
Facility: HOSPITAL | Age: 79
LOS: 1 days | Discharge: HOME | End: 2024-02-03
Attending: EMERGENCY MEDICINE
Payer: COMMERCIAL

## 2024-02-03 VITALS
RESPIRATION RATE: 20 BRPM | HEIGHT: 63 IN | TEMPERATURE: 98 F | OXYGEN SATURATION: 98 % | SYSTOLIC BLOOD PRESSURE: 154 MMHG | WEIGHT: 119.49 LBS | DIASTOLIC BLOOD PRESSURE: 91 MMHG | HEART RATE: 60 BPM

## 2024-02-03 DIAGNOSIS — Z90.49 ACQUIRED ABSENCE OF OTHER SPECIFIED PARTS OF DIGESTIVE TRACT: Chronic | ICD-10-CM

## 2024-02-03 DIAGNOSIS — I25.10 ATHEROSCLEROTIC HEART DISEASE OF NATIVE CORONARY ARTERY WITHOUT ANGINA PECTORIS: Chronic | ICD-10-CM

## 2024-02-03 DIAGNOSIS — Z95.0 PRESENCE OF CARDIAC PACEMAKER: Chronic | ICD-10-CM

## 2024-02-03 PROCEDURE — 72125 CT NECK SPINE W/O DYE: CPT | Mod: MA

## 2024-02-03 PROCEDURE — 99284 EMERGENCY DEPT VISIT MOD MDM: CPT | Mod: 25

## 2024-02-03 PROCEDURE — 12013 RPR F/E/E/N/L/M 2.6-5.0 CM: CPT

## 2024-02-03 PROCEDURE — 70450 CT HEAD/BRAIN W/O DYE: CPT | Mod: MA

## 2024-02-03 PROCEDURE — 90471 IMMUNIZATION ADMIN: CPT

## 2024-02-03 PROCEDURE — 72125 CT NECK SPINE W/O DYE: CPT | Mod: 26,MA

## 2024-02-03 PROCEDURE — 90715 TDAP VACCINE 7 YRS/> IM: CPT

## 2024-02-03 PROCEDURE — 70450 CT HEAD/BRAIN W/O DYE: CPT | Mod: 26,MA

## 2024-02-03 RX ORDER — TETANUS TOXOID, REDUCED DIPHTHERIA TOXOID AND ACELLULAR PERTUSSIS VACCINE, ADSORBED 5; 2.5; 8; 8; 2.5 [IU]/.5ML; [IU]/.5ML; UG/.5ML; UG/.5ML; UG/.5ML
0.5 SUSPENSION INTRAMUSCULAR ONCE
Refills: 0 | Status: COMPLETED | OUTPATIENT
Start: 2024-02-03 | End: 2024-02-03

## 2024-02-03 RX ORDER — ACETAMINOPHEN 500 MG
650 TABLET ORAL ONCE
Refills: 0 | Status: COMPLETED | OUTPATIENT
Start: 2024-02-03 | End: 2024-02-03

## 2024-02-03 RX ADMIN — Medication 650 MILLIGRAM(S): at 16:30

## 2024-02-03 RX ADMIN — Medication 650 MILLIGRAM(S): at 17:58

## 2024-02-03 RX ADMIN — TETANUS TOXOID, REDUCED DIPHTHERIA TOXOID AND ACELLULAR PERTUSSIS VACCINE, ADSORBED 0.5 MILLILITER(S): 5; 2.5; 8; 8; 2.5 SUSPENSION INTRAMUSCULAR at 16:30

## 2024-02-03 NOTE — ED PROVIDER NOTE - NSICDXPASTMEDICALHX_GEN_ALL_CORE_FT
PAST MEDICAL HISTORY:  BPH (benign prostatic hypertrophy)     Coronary artery disease stents    CVA (cerebral vascular accident) 10-15 years ago -- No residual    Difficult intubation patient has notation with pacemaker information that he is a difficult intubation but no specifics given    Hyperlipidemia     Hypertension     Sinoatrial node dysfunction guidant pacemaker  [employer] I Entra SSIRO IS-1, Comp, 1198, [id number]  leads Bipolar 3.2 LP Passive RV 52, 59 cm, 4260, [id number]  implanted oon 11/21/05 by Dr. Clark at [facility]    Snoring SWATI precautions -- responds affirmatively to STOP BANG questionnaire -- admits to intermittent snoring; age > 50; gender, male; h/o htn    Vasovagal syncope

## 2024-02-03 NOTE — ED PROVIDER NOTE - OBJECTIVE STATEMENT
78-year-old male with history of HTN, PPM, HLD, CAD, last tetanus unknown, as per daughter, patient normally walks with a cane, but was not walking today while going to the kitchen.  Patient tripped and fell on to the kitchen floor.  He fell forward and sustained laceration to left eyebrow.  Patient denies LOC, neck pain, headache.  Patient was able to get up on his own and walk

## 2024-02-03 NOTE — ED PROVIDER NOTE - PROGRESS NOTE DETAILS
CT head/C-spine negative  Patient is in no distress, will D/C home with daughter  Laceration care given to daughter and patient  Pt's able to ambulate with no diff.

## 2024-02-03 NOTE — ED ADULT NURSE NOTE - NSFALLRISKINTERV_ED_ALL_ED

## 2024-02-03 NOTE — ED PROVIDER NOTE - PATIENT PORTAL LINK FT
You can access the FollowMyHealth Patient Portal offered by [facility] by registering at the following website: [email or web address]. By joining [employer]’s FollowMyHealth portal, you will also be able to view your health information using other applications (apps) compatible with our system.

## 2024-02-03 NOTE — ED PROVIDER NOTE - NSFOLLOWUPINSTRUCTIONS_ED_ALL_ED_FT
Head Injury    WHAT YOU NEED TO KNOW:    What do I need to know about a head injury? A head injury can include your scalp, face, skull, or brain and range from mild to severe. Effects can appear immediately after the injury or develop later. The effects may last a short time or be permanent. Healthcare providers may want to check your recovery over time. Treatment may change as you recover or develop new health problems from the head injury.    What are the signs and symptoms of a head injury?    An open scalp or skin wound, swelling, or bruising    Mild to moderate headache    Dizziness or loss of balance    Nausea or vomiting    Ringing in the ears or neck pain    Confusion, especially right after the injury    Change in mood, such as feeling restless or irritable    Trouble thinking, remembering, or concentrating    Drowsiness or decreased amount of energy    Trouble sleeping  How is a head injury diagnosed?    Tell your healthcare provider about your injury and symptoms. The provider will do an exam to check your brain function. He or she will check how your pupils react to light. He or she will check your memory, hand grasp, and balance.    You may need x-rays, a CT scan, or an MRI to check for bleeding or major damage to your skull or brain. You may be given contrast liquid to help the pictures show up better. Tell the healthcare provider if you have ever had an allergic reaction to contrast liquid. Do not enter the MRI room with anything metal. Metal can cause serious injury. Tell the provider if you have any metal in or on your body.  How is a head injury treated? A mild head injury may not need to be treated. You may be given medicine to decrease pain. Other treatments may depend on how severe your head injury is. A concussion, hematoma (collection of blood), or traumatic brain injury may need both immediate and long-term treatment.    How can I manage my symptoms?    Rest or do quiet activities. Limit your time watching TV, using the computer, or doing tasks that require a lot of thinking. Slowly return to your normal activities as directed. Do not play sports or do activities that may cause you to get hit in the head. Ask your healthcare provider when you can return to sports.    Apply ice on your head for 15 to 20 minutes every hour or as directed. Use an ice pack, or put crushed ice in a plastic bag. Cover it with a towel before you apply it to your skin. Ice helps prevent tissue damage and decreases swelling and pain.    Have someone stay with you for 24 hours , or as directed. This person can monitor you for problems and call for help if needed. When you are awake, the person should ask you a few questions every few hours to see if you are thinking clearly. An example is to ask your name or address.  What can I do to prevent another head injury?    Wear a helmet that fits properly. Do this when you play sports, or ride a bike, scooter, or skateboard. Helmets help decrease your risk for a serious head injury. Talk to your healthcare provider about other ways you can protect yourself if you play sports.    Wear your seatbelt every time you are in a car. This helps lower your risk for a head injury if you are in a car accident.  Call your local emergency number (911 in the ), or have someone else call if:    You cannot be woken.    You have a seizure.    You stop responding to others or you faint.    You have blurry or double vision.    Your speech becomes slurred or confused.    You have arm or leg weakness, loss of feeling, or new problems with coordination.    Your pupils are larger than usual, or one pupil is a different size than the other.    You have blood or clear fluid coming out of your ears or nose.  When should I seek immediate care?    You have repeated or forceful vomiting.    You feel confused.    Your headache gets worse or becomes severe.    You or someone caring for you notices that you are harder to wake than usual.  When should I call my doctor?    Your symptoms last longer than 6 weeks after the injury.    You have questions or concerns about your condition or care.  CARE AGREEMENT:    You have the right to help plan your care. Learn about your health condition and how it may be treated. Discuss treatment options with your healthcare providers to decide what care you want to receive. You always have the right to refuse treatment.      Laceration Care, Adult  A laceration is a cut that may go through all layers of the skin and into the tissue that is right under the skin. Some lacerations heal on their own. Others need to be closed with stitches (sutures), staples, skin adhesive strips, or skin glue.    Proper care of a laceration reduces the risk for infection, helps the laceration heal better, and may prevent scarring.    General tips  Keep the wound clean and dry.  Do not scratch or pick at the wound.  Wash your hands with soap and water for at least 20 seconds before and after touching your wound or changing your bandage (dressing). If soap and water are not available, use hand .  Do not usedisinfectants or antiseptics, such as rubbing alcohol, to clean your wound unless told by your health care provider.  If you were given a dressing, you should change it at least once a day, or as told by your health care provider. You should also change it if it becomes wet or dirty.  How to care for your laceration  If sutures or staples were used:    Keep the wound completely dry for the first 24 hours, or as told by your health care provider. After that time, you may shower or bathe. Do not soak your wound in water until after the sutures or staples have been removed.  Clean the wound once each day, or as told by your health care provider. To do this:  Wash the wound with soap and water.  Rinse the wound with water to remove all soap.  Pat the wound dry with a clean towel. Do not rub the wound.  After cleaning the wound, apply a thin layer of antibiotic ointment, other topical ointments, or a non-adherent dressing as told by your health care provider. This will help prevent infection and keep the dressing from sticking to the wound.  Have the sutures or staples removed as told by your health care provider. Do not remove sutures or staples yourself.  If skin adhesive strips were used:    Do not get the skin adhesive strips wet. You may shower or bathe, but keep the wound dry.  If the wound gets wet, pat it dry with a clean towel. Do not rub the wound.  Skin adhesive strips fall off on their own. If adhesive strip edges start to loosen and curl up, you may trim the loose edges. Do not remove adhesive strips completely unless your health care provider tells you to do that.  If skin glue was used:    You may shower or bathe, but try to keep the wound dry. Do not soak the wound in water.  After showering or bathing, pat the wound dry with a clean towel. Do not rub the wound.  Do not do any activities that will make you sweat a lot until the skin glue has fallen off.  Do not apply liquid, cream, or ointment medicine to the wound while the skin glue is in place. Doing this may loosen the film before the wound has healed.  If a dressing is placed over the wound, do not apply tape directly over the skin glue. Doing this may cause the glue to be pulled off before the wound has healed.  Do not pick at the glue. Skin glue usually remains in place for 5–10 days and then falls off the skin.  Follow these instructions at home:  Medicines    Take over-the-counter and prescription medicines only as told by your health care provider.  If you were prescribed an antibiotic medicine or ointment, take or apply it as told by your health care provider. Do not stop using it even if your condition improves.  Managing pain and swelling    If directed, put ice on the injured area. To do this:  Put ice in a plastic bag.  Place a towel between your skin and the bag.  Leave the ice on for 20 minutes, 2–3 times a day.  Remove the ice if your skin turns bright red. This is very important. If you cannot feel pain, heat, or cold, you have a greater risk of damage to the area.  Raise (elevate) the injured area above the level of your heart while you are sitting or lying down for the first 24–48 hours after the laceration is repaired.  General instructions    Two wounds closed with skin glue. One is normal. The other is red with pus and infected.  Avoid any activity that could cause your wound to reopen.  Check your wound every day for signs of infection. Watch for:  More redness, swelling, or pain.  Fluid or blood.  Warmth.  Pus or a bad smell.  Keep all follow-up visits. This is important.  Contact a health care provider if:  You received a tetanus shot and you have swelling, severe pain, redness, or bleeding at the injection site.  Your closed wound breaks open.  You have any of these signs of infection:  More redness, swelling, or pain around your wound.  Fluid or blood coming from your wound.  Warmth coming from your wound.  Pus or a bad smell coming from your wound.  A fever.  You notice something coming out of the wound, such as wood or glass.  Your pain is not controlled with medicine.  You notice a change in the color of your skin near your wound.  You need to change the dressing often.  You develop a new rash.  You have numbness around the wound.  Get help right away if:  You develop severe swelling around the wound.  Your pain suddenly increases and is severe.  You develop painful lumps near the wound or on skin anywhere else on your body.  You have a red streak going away from your wound.  The wound is on your hand or foot, and you cannot properly move a finger or toe.  The wound is on your hand or foot, and you notice that your fingers or toes look pale or bluish.  Summary  A laceration is a cut that may go through all layers of the skin and into the tissue that is right under the skin.  Some lacerations heal on their own. Others need to be closed with stitches (sutures), staples, skin adhesive strips, or skin glue.  Proper care of a laceration reduces the risk of infection, helps the laceration heal better, and may prevent scarring.  This information is not intended to replace advice given to you by your health care provider. Make sure you discuss any questions you have with your health care provider.      You have received a tetanus shot, you can develop fever and also pain to injection site tomorrow.  Take Tylenol for pain as needed 4 times a day  Keep wound clean and dry  Stitches removed in 5 days  Avoid sun exposure

## 2024-02-03 NOTE — ED PROVIDER NOTE - CLINICAL SUMMARY MEDICAL DECISION MAKING FREE TEXT BOX
Status post tripped and fell, sustaining laceration to left eyebrow And denies other injuries. will get CT head, suture and give Tylenol and tetanus and reassess

## 2024-02-09 ENCOUNTER — EMERGENCY (EMERGENCY)
Facility: HOSPITAL | Age: 79
LOS: 1 days | Discharge: HOME | End: 2024-02-09
Attending: STUDENT IN AN ORGANIZED HEALTH CARE EDUCATION/TRAINING PROGRAM
Payer: COMMERCIAL

## 2024-02-09 VITALS
SYSTOLIC BLOOD PRESSURE: 111 MMHG | DIASTOLIC BLOOD PRESSURE: 72 MMHG | WEIGHT: 119.49 LBS | TEMPERATURE: 98 F | HEIGHT: 63 IN | HEART RATE: 72 BPM | OXYGEN SATURATION: 99 % | RESPIRATION RATE: 16 BRPM

## 2024-02-09 DIAGNOSIS — I25.10 ATHEROSCLEROTIC HEART DISEASE OF NATIVE CORONARY ARTERY WITHOUT ANGINA PECTORIS: Chronic | ICD-10-CM

## 2024-02-09 DIAGNOSIS — Z95.0 PRESENCE OF CARDIAC PACEMAKER: Chronic | ICD-10-CM

## 2024-02-09 DIAGNOSIS — Z90.49 ACQUIRED ABSENCE OF OTHER SPECIFIED PARTS OF DIGESTIVE TRACT: Chronic | ICD-10-CM

## 2024-02-09 PROCEDURE — L9995: CPT

## 2024-02-09 PROCEDURE — G0463: CPT

## 2024-02-09 NOTE — ED PROVIDER NOTE - PHYSICAL EXAMINATION
Gen: NAD, AOx3, able to make needs known, non-toxic  Head: well healed 3.5cm laceration with sutures to left eyebrow, no surrounding induration, fluctuance, crepitus or erythema.   HEENT: EOMI, oral mucosa moist, normal conjunctiva  MSK: no visible deformities  Neuro: Appears non focal  Skin: Warm, well perfused, no rash  Psych: normal affect

## 2024-02-09 NOTE — ED PROVIDER NOTE - NSFOLLOWUPINSTRUCTIONS_ED_ALL_ED_FT
1) Follow up with your doctor   2) Return to the ED immediately for new or worsening symptoms  3) Please continue to take any home medications as prescribed    Wound Closure Removal, Care After  The following information offers guidance on how to care for yourself after your stitches (sutures), staples, or adhesive strips have been removed. Your health care provider may also give you more specific instructions. If you have problems or questions, contact your health care provider.    What can I expect after the procedure?  After your sutures or staples have been removed or your adhesive strips have fallen off, it is common to have:  Some discomfort and swelling in the area.  Slight redness in the area.  Follow these instructions at home:  If you have a dressing:    Wash your hands with soap and water for at least 20 seconds before and after you change your bandage (dressing). If soap and water are not available, use hand .  Change your dressing as told by your health care provider. If your dressing becomes wet or dirty, or develops a bad smell, change it as soon as possible.  If your dressing sticks to your skin, pour warm, clean water over it until it loosens and can be removed without pulling apart the wound edges. Pat the area dry with a soft, clean towel. Do not rub the wound because that may cause bleeding.  Wound care    Washing hands with soap and water.  Check your wound every day for signs of infection. Check for:  More redness, swelling, or pain.  Fluid or blood.  New warmth, a rash, or hardness at the wound site.  Pus or a bad smell.  Wash your hands with soap and water for at least 20 seconds before and after touching your wound. If soap and water are not available, use hand .  Keep the wound area dry and clean. Clean and pat the wound dry as told by your health care provider.  Apply cream or ointment only as told by your health care provider.  If skin glue or adhesive strips were applied after sutures or staples were removed, leave these closures in place until they peel off on their own. If adhesive strip edges start to loosen and curl up, you may trim the loose edges. Do not remove adhesive strips completely unless your health care provider tells you to do that.  Continue to protect the wound from injury.  Do not pick at your wound. Picking can cause an infection.  Bathing    Do not take baths, swim, or use a hot tub until your health care provider approves. Ask your health care provider if you may take showers.  Follow these steps for showering:  If you have a dressing, remove it before getting into the shower.  In the shower, allow soapy water to get on the wound. Avoid scrubbing the wound.  When you get out of the shower, dry the wound by patting it with a clean towel.  Reapply a dressing over the wound, if needed.  Scar care    When your wound has completely healed, help decrease the size of your scar by:  Wearing sunscreen over the scar or covering it with clothing when you are outside. New scars get sunburned easily, which can make scarring worse.  Gently massaging the scarred area. This can decrease scar thickness.  General instructions    Take over-the-counter and prescription medicines only as told by your health care provider.  Keep all follow-up visits. This is important.  Contact a health care provider if:  You have more redness, swelling, or pain around your wound.  You have fluid or blood coming from your wound.  You have new warmth, a rash, or hardness at the wound site.  You have pus or a bad smell coming from your wound.  Your wound opens up.  Get help right away if:  You have a fever or chills.  You have red streaks coming from your wound.  Summary  Change your dressing as told by your health care provider. If your dressing becomes wet or dirty, or develops a bad smell, change it as soon as possible.  Check your wound every day for signs of infection.  Wash your hands with soap and water for 20 seconds before and after touching your wound.  This information is not intended to replace advice given to you by your health care provider. Make sure you discuss any questions you have with your health care provider.

## 2024-02-09 NOTE — ED PROVIDER NOTE - OBJECTIVE STATEMENT
78-year-old male PMH HTN, PPM, HLD, CAD presenting to emergency department for suture removal.  Patient was seen here on February 3 for a trip and fall sustained a left eyebrow laceration.  Sutured with 10 nylon sutures.  No purulent drainage fever rash, other complaint.

## 2024-02-09 NOTE — ED PROVIDER NOTE - ATTENDING APP SHARED VISIT CONTRIBUTION OF CARE
I, Tanner Mabry DO reviewed the [facility] plan of care and discussed the case with the ACP.  I was available for any questions and concerns

## 2024-02-09 NOTE — ED PROVIDER NOTE - PATIENT PORTAL LINK FT
You can access the FollowMyHealth Patient Portal offered by [facility] by registering at the following website: [email or web address]. By joining [employer]’s FollowMyHealth portal, you will also be able to view your health information using other applications (apps) compatible with our system.

## 2024-02-09 NOTE — ED PROVIDER NOTE - CLINICAL SUMMARY MEDICAL DECISION MAKING FREE TEXT BOX
78-year-old male PMH HTN, PPM, HLD, CAD presenting to emergency department for suture removal.  Patient was seen here on February 3 for a trip and fall sustained a left eyebrow laceration.  Sutured with 10 nylon sutures.  No purulent drainage fever rash, other complaint. PE as above, will remove suture, wound care strict return precautions.

## 2024-02-23 NOTE — PROGRESS NOTE ADULT - SUBJECTIVE AND OBJECTIVE BOX
"Pt concerned re: \"what she heard\" about mammograms increasing risk for breast CA due to radiation. I discussed this fully. We discussed several studies and position papers written in the last 10y. While there may be a slight increased risk due to accumulative radiation exposure, it appears to be much less that the risk of missing an early lesion that could be successfully treated. We also discussed non-ionizing radiation based studies such as breast MRI, and the [facility] program (typically less than $400). Pt to consider.  She will call back with her decision   " _________________________________________________________________________________________  ========>>  M E D I C A L   A T T E N D I N G    F O L L O W  U P  N O T E  <<=========  -----------------------------------------------------------------------------------------------------    - Patient seen and examined by me earlier today.   - In summary, patient is a 72y year old man who originally presented with CP  - Patient today overall doing well, chest pain free, in CCU post Hypotension in Cath lab, post Hematoma in groin..     ==================>> REVIEW OF SYSTEM <<=================    GEN: no fever, no chills, no pain  RESP: no SOB, no cough, no sputum  CVS: no chest pain, no palpitations, no edema  GI: no abdominal pain, no nausea, no constipation, no diarrhea  : no dysuria, no frequency, no hematuria  Neuro: no headache, no dizziness  Derm : no itching, no rash    ==================>> PHYSICAL EXAM <<=================    GEN: A&O X 3 , NAD , comfortable  HEENT: NCAT, PERRL, MMM, hearing intact  Neck: supple , no JVD  CVS: S1S2 , regular , No M/R/G appreciated  PULM: CTA B/L,  no W/R/R appreciated  ABD.: soft. non tender, non distended,  bowel sounds present  Extrem: intact pulses , no edema, b/l LE warm to touch, + right groin ecchymosis   PSYCH : normal mood,  not anxious       ==================>> MEDICATIONS <<====================    artificial  tears Solution 1 Drop(s) Both EYES three times a day  aspirin enteric coated 81 milliGRAM(s) Oral daily  atorvastatin 80 milliGRAM(s) Oral at bedtime  chlorhexidine 4% Liquid 1 Application(s) Topical <User Schedule>  latanoprost 0.005% Ophthalmic Solution 1 Drop(s) Both EYES at bedtime  metoprolol tartrate 12.5 milliGRAM(s) Oral two times a day  potassium phosphate / sodium phosphate powder 1 Packet(s) Oral two times a day  sodium chloride 0.9% lock flush 3 milliLiter(s) IV Push every 8 hours  sodium chloride 0.9%. 500 milliLiter(s) IV Continuous <Continuous>  tamsulosin 0.4 milliGRAM(s) Oral at bedtime  ticagrelor 90 milliGRAM(s) Oral two times a day    ==================>> VITAL SIGNS <<==================    Vital Signs Last 24 Hrs  T(C): 36.6 (06-21-18 @ 12:00)  T(F): 97.9 (06-21-18 @ 12:00), Max: 98.9 (06-20-18 @ 20:00)  HR: 74 (06-21-18 @ 12:00) (57 - 85)  BP: 107/67 (06-21-18 @ 12:00)  BP(mean): 78 (06-21-18 @ 12:00) (59 - 106)  RR: 27 (06-21-18 @ 12:00) (14 - 31)  SpO2: 97% (06-21-18 @ 12:00) (96% - 100%)       ==================>> LAB AND IMAGING <<==================                        13.3   6.67  )-----------( 115      ( 21 Jun 2018 05:50 )             39.6        06-21    138  |  104  |  24<H>  ----------------------------<  100<H>  4.1   |  20<L>  |  1.04    Ca    8.1<L>      21 Jun 2018 05:50  Phos  2.2     06-21  Mg     2.2     06-21    ___________________________________________________________________________________  ===============>>  A S S E S S M E N T   A N D   P L A N <<===============  ------------------------------------------------------------------------------------------    **CAD, STEMI, h/o HTN, HLD, arrythmia post PPM,   ---CCU / cardio appreciated, for cath  and reattempt for PCI tomorrow  --- Continue Current medications and monitor.   --- pt off pressors  ---tele monitor  ---echo  ---monitor vitals closely    **BPH  ---flomax    -GI/DVT Prophylaxis.    --------------------------------------------  Case discussed with pt, wife  Education given on findings and plan of care  ___________________________  H. POOJA Lemons.  Pager: [phone]

## 2024-03-15 ENCOUNTER — EMERGENCY (EMERGENCY)
Facility: HOSPITAL | Age: 79
LOS: 1 days | Discharge: HOME | End: 2024-03-15
Attending: STUDENT IN AN ORGANIZED HEALTH CARE EDUCATION/TRAINING PROGRAM
Payer: COMMERCIAL

## 2024-03-15 VITALS
TEMPERATURE: 98 F | SYSTOLIC BLOOD PRESSURE: 111 MMHG | RESPIRATION RATE: 16 BRPM | HEIGHT: 63 IN | DIASTOLIC BLOOD PRESSURE: 79 MMHG | OXYGEN SATURATION: 100 % | WEIGHT: 119.05 LBS | HEART RATE: 60 BPM

## 2024-03-15 VITALS
OXYGEN SATURATION: 96 % | DIASTOLIC BLOOD PRESSURE: 90 MMHG | SYSTOLIC BLOOD PRESSURE: 144 MMHG | RESPIRATION RATE: 19 BRPM | TEMPERATURE: 98 F | HEART RATE: 68 BPM

## 2024-03-15 DIAGNOSIS — Z95.0 PRESENCE OF CARDIAC PACEMAKER: Chronic | ICD-10-CM

## 2024-03-15 DIAGNOSIS — Z90.49 ACQUIRED ABSENCE OF OTHER SPECIFIED PARTS OF DIGESTIVE TRACT: Chronic | ICD-10-CM

## 2024-03-15 DIAGNOSIS — I25.10 ATHEROSCLEROTIC HEART DISEASE OF NATIVE CORONARY ARTERY WITHOUT ANGINA PECTORIS: Chronic | ICD-10-CM

## 2024-03-15 PROCEDURE — 72125 CT NECK SPINE W/O DYE: CPT | Mod: MC

## 2024-03-15 PROCEDURE — 72131 CT LUMBAR SPINE W/O DYE: CPT | Mod: 26,MC

## 2024-03-15 PROCEDURE — 72125 CT NECK SPINE W/O DYE: CPT | Mod: 26,MC

## 2024-03-15 PROCEDURE — 70450 CT HEAD/BRAIN W/O DYE: CPT | Mod: MC

## 2024-03-15 PROCEDURE — 99285 EMERGENCY DEPT VISIT HI MDM: CPT

## 2024-03-15 PROCEDURE — 99284 EMERGENCY DEPT VISIT MOD MDM: CPT | Mod: 25

## 2024-03-15 PROCEDURE — 70450 CT HEAD/BRAIN W/O DYE: CPT | Mod: 26,MC

## 2024-03-15 PROCEDURE — 93005 ELECTROCARDIOGRAM TRACING: CPT

## 2024-03-15 PROCEDURE — 72131 CT LUMBAR SPINE W/O DYE: CPT | Mod: MC

## 2024-03-15 RX ORDER — ACETAMINOPHEN 500 MG
975 TABLET ORAL ONCE
Refills: 0 | Status: COMPLETED | OUTPATIENT
Start: 2024-03-15 | End: 2024-03-15

## 2024-03-15 RX ORDER — LIDOCAINE 4 G/100G
1 CREAM TOPICAL ONCE
Refills: 0 | Status: COMPLETED | OUTPATIENT
Start: 2024-03-15 | End: 2024-03-15

## 2024-03-15 RX ADMIN — LIDOCAINE 1 PATCH: 4 CREAM TOPICAL at 17:44

## 2024-03-15 RX ADMIN — Medication 975 MILLIGRAM(S): at 17:14

## 2024-03-15 NOTE — ED ADULT NURSE NOTE - DISCHARGE DATE/TIME
Stage 6: Additional Anesthesia Type: 1% lidocaine with 1:100,000 epinephrine and a 1:10 solution of 8.4% sodium bicarbonate 15-Mar-2024 20:00

## 2024-03-15 NOTE — ED PROVIDER NOTE - PHYSICAL EXAMINATION
CONSTITUTIONAL: non-toxic, well appearing  SKIN: no rash, no petechiae.  EYES: PERRL, EOMI, pink conjunctiva, anicteric  ENT: tongue and uvular midline, no exudates, moist mucous membranes  NECK: Supple; no meningismus, no JVD  CARD: RRR, no murmurs, equal radial pulses bilaterally 2+  RESP: CTAB, no respiratory distress  ABD: Soft, non-tender, non-distended, no peritoneal signs, no CVA tenderness  EXT: Normal ROM x4. No edema.   SPINE: no midlline bony tenderness  NEURO: Alert, oriented. Neuro exam nonfocal, equal strength bilaterally. 5/5 strength BLE.   PSYCH: Cooperative, appropriate.

## 2024-03-15 NOTE — ED ADULT NURSE NOTE - OBJECTIVE STATEMENT
Pt presents to the ED s/p fall x yesterday with head strike, denies LOC. Pt Is ambulatory with cane, pacemaker to left chest present on ED arrival.

## 2024-03-15 NOTE — ED PROVIDER NOTE - NSICDXPASTMEDICALHX_GEN_ALL_CORE_FT
PAST MEDICAL HISTORY:  BPH (benign prostatic hypertrophy)     Coronary artery disease stents    CVA (cerebral vascular accident) 10-15 years ago -- No residual    Difficult intubation patient has notation with pacemaker information that he is a difficult intubation but no specifics given    Hyperlipidemia     Hypertension     Sinoatrial node dysfunction guidant pacemaker  [employer] I [employer]ra SSIRO IS-1, Comp, [id number], [id number]  leads Bipolar 3.2 LP Passive RV 52, 59 cm, [id number], [id number]  implanted oon 11/21/05 by Dr. Clark at [facility]    Snoring SWATI precautions -- responds affirmatively to STOP BANG questionnaire -- admits to intermittent snoring; age > 50; gender, male; h/o htn    Vasovagal syncope

## 2024-03-15 NOTE — ED PROVIDER NOTE - CLINICAL SUMMARY MEDICAL DECISION MAKING FREE TEXT BOX
Wu: 78-year-old male with past medical history hypertension, hyperlipidemia, CVA, CAD status post stents, status post permanent pacemaker on aspirin presents status post fall yesterday.  Patient states he was walking downstairs, tripped on last step, and fell onto left side.  Patient reports head strike, denies LOC, patient ambulatory afterwards.  Patient reports left-sided neck pain and left-sided low back pain.  Denies any fevers, vision change, chest pain, shortness of breath, dizziness, lightheadedness, abdominal pain, vomiting, diarrhea, dysuria, numbness, weakness, saddle anesthesia, bowel/bladder dysfunction, or rash.  Patient states fall mechanical in nature, denies any preceding prodrome.  Patient reports taking Tylenol this morning with temporary relief.  Patient ambulatory without assistance.  Denies anticoagulation use. Physical exam per above. Patient GCS 15, no radiculopathy, no bony tenderness. EKG paced rhythm at 50 bpm in setting of metoprolol use. Will obtain imaging r/o fx r/o ICH, provide supportive treatment with dispo pending workup.

## 2024-03-15 NOTE — ED ADULT NURSE NOTE - NSICDXPASTMEDICALHX_GEN_ALL_CORE_FT
PAST MEDICAL HISTORY:  BPH (benign prostatic hypertrophy)     Coronary artery disease stents    CVA (cerebral vascular accident) 10-15 years ago -- No residual    Difficult intubation patient has notation with pacemaker information that he is a difficult intubation but no specifics given    Hyperlipidemia     Hypertension     Sinoatrial node dysfunction guidant pacemaker  [employer] I Entra SSIRO IS-1, Comp, 1198, [id number]  leads Bipolar 3.2 LP Passive RV 52, 59 cm, [id number], [id number]  implanted oon 11/21/05 by Dr. Clark at [facility]    Snoring SWATI precautions -- responds affirmatively to STOP BANG questionnaire -- admits to intermittent snoring; age > 50; gender, male; h/o htn    Vasovagal syncope

## 2024-03-15 NOTE — ED PROVIDER NOTE - OBJECTIVE STATEMENT
78-year-old male with past medical history hypertension, hyperlipidemia, CVA, CAD status post stents, status post permanent pacemaker on aspirin presents status post fall yesterday.  Patient states he was walking downstairs, tripped on last step, and fell onto left side.  Patient reports head strike, denies LOC, patient ambulatory afterwards.  Patient reports left-sided neck pain and left-sided low back pain.  Denies any fevers, vision change, chest pain, shortness of breath, dizziness, lightheadedness, abdominal pain, vomiting, diarrhea, dysuria, numbness, weakness, saddle anesthesia, bowel/bladder dysfunction, or rash.  Patient states fall mechanical in nature, denies any preceding prodrome.  Patient reports taking Tylenol this morning with temporary relief.  Patient ambulatory without assistance.  Denies anticoagulation use.  Denies any additional complaints.

## 2024-03-15 NOTE — ED PROVIDER NOTE - PROGRESS NOTE DETAILS
Theresa-DO: pt seen and re-evaluated at bedside.  Pt states their symptoms have improved.  Pt comfortable in NAD.  Imaging with non-actionable findings, pt ambulatory without assistance. Will DC with outpatient follow up/return precautions, pt understood and agreeable with plan.

## 2024-03-15 NOTE — ED PROVIDER NOTE - NSFOLLOWUPINSTRUCTIONS_ED_ALL_ED_FT
Back Pain    Back pain is very common in adults. The cause of back pain is rarely dangerous and the pain often gets better over time. The cause of your back pain may not be known and may include strain of muscles or ligaments, degeneration of the spinal disks, or arthritis. Occasionally the pain may radiate down your leg(s). Over-the-counter medicines to reduce pain and inflammation are often the most helpful. Stretching and remaining active frequently helps the healing process.     Rest and stay well hydrated.    Take over the counter acetaminophen (Tylenol) 650-1000 mg every 4-6 hours as needed for pain. Do not take more than 3000 mg in a 24 hour period. Be aware many over the counter and prescription medications also contain acetaminophen (Tylenol).     Apply over the counter lidocaine patch to affected area as needed for pain; leave on for 12 hours, leave off for up to 12 hours.     SEEK IMMEDIATE MEDICAL CARE IF YOU HAVE ANY OF THE FOLLOWING SYMPTOMS: bowel or bladder control problems, unusual weakness or numbness in your arms or legs, nausea or vomiting, abdominal pain, fever, dizziness/lightheadedness.

## 2024-03-15 NOTE — ED PROVIDER NOTE - PATIENT PORTAL LINK FT
You can access the FollowMyHealth Patient Portal offered by [facility] by registering at the following website: [email or web address]. By joining [employer]’s FollowMyHealth portal, you will also be able to view your health information using other applications (apps) compatible with our system.

## 2024-03-15 NOTE — ED ADULT NURSE NOTE - NSFALLHARMRISKINTERV_ED_ALL_ED

## 2024-03-15 NOTE — ED ADULT TRIAGE NOTE - CHIEF COMPLAINT QUOTE
"he fell y/d and hurt his back and neck after missing a step and fell down three steps on his side hitting his head".  Alba Rueda ASPIRIN, Tylenol taken at 0700

## 2024-04-17 NOTE — ED ADULT TRIAGE NOTE - IDEAL BODY WEIGHT(KG)
I ordered urine culture and urinalysis to be collected through home health   - please update HH and pt so specimen can be collected     Instructions:   1. Wash hands with soap and warm water.   2. Spread the labia (folds of skin) apart with 1 hand and wipe with the towelette provided.  Wipe from front to back.  3.  Continue holding the labia apart. As you start to urinate, allow a small amount of urine to fall into the toilet bowl.  This clears the urethra of contaminants.  Do not touch the inside of the cup.  4.  After the urine stream is well-established, urinate into the cup.  Once an adequate amount of urine fills the cup (the cup only needs to be half full), removed the cup from the urine stream.  5.  Pass the remaining urine into the toilet.  6.  Screw the lid on the cup tightly.  Do not touch the inside of the cup or lid.  Please bring the specimen to the lab.        57

## 2024-06-11 NOTE — ED PROCEDURE NOTE - NS_EDPROVIDERDISPOUSERTYPE_ED_A_ED
Abdomen , soft, nontender, nondistended , no guarding or rigidity , no masses palpable , normal bowel sounds , Liver and Spleen , no hepatomegaly present , no hepatosplenomegaly , liver nontender , spleen not palpable, Rectal, large external hemorrhoids and 1 thrombosed external hemorrhoid
Attending Attestation (For Attendings USE Only)...

## 2024-06-29 NOTE — ED PROVIDER NOTE - MOUTH/THROAT [+], MLM
Problem: Pain  Goal: Acceptable pain level achieved/maintained at rest using appropriate pain scale for the patient  Outcome: Not met, plan adjusted  Goal: Acceptable pain level achieved/maintained with activity using appropriate pain scale for the patient  Outcome: Not met, plan adjusted  Goal: Acceptable pain level achieved/maintained without oversedation  Outcome: Not met, plan adjusted     Problem: Fluid Volume Excess  Goal: Fluid Volume Excess Symptoms Resolved  Description: Treatment often consists of oxygen and respiratory support with diuretic therapy at doses that exceed usual dose (typically doubled).  Monitor patient response to treatment.    Acute dyspnea should resolve quickly if dose is adequate and kidney function is adequate. Dyspnea/SOB should only be observed with Activity after effective treatment. Patient should be able to lie down comfortably, without SOB.  Outcome: Not met, plan adjusted  Goal: Oxygenation is maintained (SpO2 greater than or equal to 90% or as ordered)  Outcome: Not met, plan adjusted  Goal: Verbalizes understanding of FVE management plan  Description: Document on Patient Education Activity  Outcome: Not met, plan adjusted     Problem: Diabetes  Goal: Achieves glycemic balance  Description: Goal is to maintain blood sugar within range with no episodes of hypoglycemia  Outcome: Not met, plan adjusted  Goal: Verbalizes/demonstrates understanding of NEW diagnosis of diabetes and management  Description: Document on Patient Education Activity  Outcome: Not met, plan adjusted  Goal: Verbalizes understanding of diabetes management including how to use HbA1C to evaluate status of blood sugar over time (Diabetes is NOT a new diagnosis)  Description: Diabetes Education  Outcome: Not met, plan adjusted  Goal: Demonstrates ability to self-administer insulin  Description: Document on Patient Education Activity  Outcome: Not met, plan adjusted      lip lac

## 2024-07-11 NOTE — ED ADULT TRIAGE NOTE - LOCATION:
What Type Of Note Output Would You Prefer (Optional)?: Standard Output Hpi Title: Evaluation of Skin Lesions Additional History: Pt is concerned about spots on her nose and left side of neck. Left arm;

## 2024-07-17 ENCOUNTER — EMERGENCY (EMERGENCY)
Facility: HOSPITAL | Age: 79
LOS: 1 days | Discharge: HOME | End: 2024-07-17
Attending: EMERGENCY MEDICINE
Payer: COMMERCIAL

## 2024-07-17 VITALS
TEMPERATURE: 98 F | OXYGEN SATURATION: 97 % | HEART RATE: 76 BPM | SYSTOLIC BLOOD PRESSURE: 98 MMHG | WEIGHT: 110.23 LBS | RESPIRATION RATE: 18 BRPM | DIASTOLIC BLOOD PRESSURE: 64 MMHG | HEIGHT: 64 IN

## 2024-07-17 VITALS
HEART RATE: 60 BPM | DIASTOLIC BLOOD PRESSURE: 77 MMHG | RESPIRATION RATE: 18 BRPM | TEMPERATURE: 98 F | OXYGEN SATURATION: 98 % | SYSTOLIC BLOOD PRESSURE: 122 MMHG

## 2024-07-17 DIAGNOSIS — I25.10 ATHEROSCLEROTIC HEART DISEASE OF NATIVE CORONARY ARTERY WITHOUT ANGINA PECTORIS: Chronic | ICD-10-CM

## 2024-07-17 DIAGNOSIS — Z95.0 PRESENCE OF CARDIAC PACEMAKER: Chronic | ICD-10-CM

## 2024-07-17 DIAGNOSIS — Z90.49 ACQUIRED ABSENCE OF OTHER SPECIFIED PARTS OF DIGESTIVE TRACT: Chronic | ICD-10-CM

## 2024-07-17 LAB — SARS-COV-2 RNA SPEC QL NAA+PROBE: DETECTED

## 2024-07-17 PROCEDURE — 87635 SARS-COV-2 COVID-19 AMP PRB: CPT

## 2024-07-17 PROCEDURE — 71046 X-RAY EXAM CHEST 2 VIEWS: CPT

## 2024-07-17 PROCEDURE — 99283 EMERGENCY DEPT VISIT LOW MDM: CPT | Mod: 25

## 2024-07-17 PROCEDURE — 99284 EMERGENCY DEPT VISIT MOD MDM: CPT

## 2024-07-17 PROCEDURE — 71046 X-RAY EXAM CHEST 2 VIEWS: CPT | Mod: 26

## 2024-09-15 ENCOUNTER — EMERGENCY (EMERGENCY)
Facility: HOSPITAL | Age: 79
LOS: 1 days | Discharge: HOME | End: 2024-09-15
Attending: STUDENT IN AN ORGANIZED HEALTH CARE EDUCATION/TRAINING PROGRAM
Payer: COMMERCIAL

## 2024-09-15 VITALS
DIASTOLIC BLOOD PRESSURE: 80 MMHG | HEART RATE: 77 BPM | RESPIRATION RATE: 18 BRPM | HEIGHT: 64 IN | SYSTOLIC BLOOD PRESSURE: 134 MMHG | OXYGEN SATURATION: 99 % | WEIGHT: 127.87 LBS | TEMPERATURE: 98 F

## 2024-09-15 DIAGNOSIS — I25.10 ATHEROSCLEROTIC HEART DISEASE OF NATIVE CORONARY ARTERY WITHOUT ANGINA PECTORIS: Chronic | ICD-10-CM

## 2024-09-15 DIAGNOSIS — Z90.49 ACQUIRED ABSENCE OF OTHER SPECIFIED PARTS OF DIGESTIVE TRACT: Chronic | ICD-10-CM

## 2024-09-15 DIAGNOSIS — Z95.0 PRESENCE OF CARDIAC PACEMAKER: Chronic | ICD-10-CM

## 2024-09-15 LAB
ALBUMIN SERPL ELPH-MCNC: 3.8 G/DL — SIGNIFICANT CHANGE UP (ref 3.5–5)
ALP SERPL-CCNC: 79 U/L — SIGNIFICANT CHANGE UP (ref 40–120)
ALT FLD-CCNC: 23 U/L DA — SIGNIFICANT CHANGE UP (ref 10–60)
ANION GAP SERPL CALC-SCNC: 6 MMOL/L — SIGNIFICANT CHANGE UP (ref 5–17)
APPEARANCE UR: CLEAR — SIGNIFICANT CHANGE UP
AST SERPL-CCNC: 20 U/L — SIGNIFICANT CHANGE UP (ref 10–40)
BASOPHILS # BLD AUTO: 0.05 K/UL — SIGNIFICANT CHANGE UP (ref 0–0.2)
BASOPHILS NFR BLD AUTO: 1 % — SIGNIFICANT CHANGE UP (ref 0–2)
BILIRUB SERPL-MCNC: 2.7 MG/DL — HIGH (ref 0.2–1.2)
BILIRUB UR-MCNC: NEGATIVE — SIGNIFICANT CHANGE UP
BUN SERPL-MCNC: 20 MG/DL — HIGH (ref 7–18)
CALCIUM SERPL-MCNC: 9.4 MG/DL — SIGNIFICANT CHANGE UP (ref 8.4–10.5)
CHLORIDE SERPL-SCNC: 106 MMOL/L — SIGNIFICANT CHANGE UP (ref 96–108)
CO2 SERPL-SCNC: 27 MMOL/L — SIGNIFICANT CHANGE UP (ref 22–31)
COLOR SPEC: YELLOW — SIGNIFICANT CHANGE UP
CREAT SERPL-MCNC: 1.28 MG/DL — SIGNIFICANT CHANGE UP (ref 0.5–1.3)
DIFF PNL FLD: NEGATIVE — SIGNIFICANT CHANGE UP
EGFR: 57 ML/MIN/1.73M2 — LOW
EOSINOPHIL # BLD AUTO: 0.07 K/UL — SIGNIFICANT CHANGE UP (ref 0–0.5)
EOSINOPHIL NFR BLD AUTO: 1.4 % — SIGNIFICANT CHANGE UP (ref 0–6)
GLUCOSE SERPL-MCNC: 152 MG/DL — HIGH (ref 70–99)
GLUCOSE UR QL: NEGATIVE MG/DL — SIGNIFICANT CHANGE UP
HCT VFR BLD CALC: 43.6 % — SIGNIFICANT CHANGE UP (ref 39–50)
HGB BLD-MCNC: 14.5 G/DL — SIGNIFICANT CHANGE UP (ref 13–17)
HIV 1 & 2 AB SERPL IA.RAPID: SIGNIFICANT CHANGE UP
IMM GRANULOCYTES NFR BLD AUTO: 0.2 % — SIGNIFICANT CHANGE UP (ref 0–0.9)
KETONES UR-MCNC: NEGATIVE MG/DL — SIGNIFICANT CHANGE UP
LEUKOCYTE ESTERASE UR-ACNC: NEGATIVE — SIGNIFICANT CHANGE UP
LIDOCAIN IGE QN: 280 U/L — HIGH (ref 13–75)
LYMPHOCYTES # BLD AUTO: 1.97 K/UL — SIGNIFICANT CHANGE UP (ref 1–3.3)
LYMPHOCYTES # BLD AUTO: 38.3 % — SIGNIFICANT CHANGE UP (ref 13–44)
MCHC RBC-ENTMCNC: 30.1 PG — SIGNIFICANT CHANGE UP (ref 27–34)
MCHC RBC-ENTMCNC: 33.3 GM/DL — SIGNIFICANT CHANGE UP (ref 32–36)
MCV RBC AUTO: 90.5 FL — SIGNIFICANT CHANGE UP (ref 80–100)
MONOCYTES # BLD AUTO: 0.49 K/UL — SIGNIFICANT CHANGE UP (ref 0–0.9)
MONOCYTES NFR BLD AUTO: 9.5 % — SIGNIFICANT CHANGE UP (ref 2–14)
NEUTROPHILS # BLD AUTO: 2.55 K/UL — SIGNIFICANT CHANGE UP (ref 1.8–7.4)
NEUTROPHILS NFR BLD AUTO: 49.6 % — SIGNIFICANT CHANGE UP (ref 43–77)
NITRITE UR-MCNC: NEGATIVE — SIGNIFICANT CHANGE UP
NRBC # BLD: 0 /100 WBCS — SIGNIFICANT CHANGE UP (ref 0–0)
PH UR: 5.5 — SIGNIFICANT CHANGE UP (ref 5–8)
PLATELET # BLD AUTO: 112 K/UL — LOW (ref 150–400)
POTASSIUM SERPL-MCNC: 4.2 MMOL/L — SIGNIFICANT CHANGE UP (ref 3.5–5.3)
POTASSIUM SERPL-SCNC: 4.2 MMOL/L — SIGNIFICANT CHANGE UP (ref 3.5–5.3)
PROT SERPL-MCNC: 6.9 G/DL — SIGNIFICANT CHANGE UP (ref 6–8.3)
PROT UR-MCNC: NEGATIVE MG/DL — SIGNIFICANT CHANGE UP
RBC # BLD: 4.82 M/UL — SIGNIFICANT CHANGE UP (ref 4.2–5.8)
RBC # FLD: 15.2 % — HIGH (ref 10.3–14.5)
SODIUM SERPL-SCNC: 139 MMOL/L — SIGNIFICANT CHANGE UP (ref 135–145)
SP GR SPEC: 1.01 — SIGNIFICANT CHANGE UP (ref 1–1.03)
UROBILINOGEN FLD QL: 0.2 MG/DL — SIGNIFICANT CHANGE UP (ref 0.2–1)
WBC # BLD: 5.14 K/UL — SIGNIFICANT CHANGE UP (ref 3.8–10.5)
WBC # FLD AUTO: 5.14 K/UL — SIGNIFICANT CHANGE UP (ref 3.8–10.5)

## 2024-09-15 PROCEDURE — 83690 ASSAY OF LIPASE: CPT

## 2024-09-15 PROCEDURE — 80053 COMPREHEN METABOLIC PANEL: CPT

## 2024-09-15 PROCEDURE — 99284 EMERGENCY DEPT VISIT MOD MDM: CPT

## 2024-09-15 PROCEDURE — 81003 URINALYSIS AUTO W/O SCOPE: CPT

## 2024-09-15 PROCEDURE — 85025 COMPLETE CBC W/AUTO DIFF WBC: CPT

## 2024-09-15 PROCEDURE — 99283 EMERGENCY DEPT VISIT LOW MDM: CPT | Mod: 25

## 2024-09-15 PROCEDURE — 51702 INSERT TEMP BLADDER CATH: CPT

## 2024-09-15 PROCEDURE — 36415 COLL VENOUS BLD VENIPUNCTURE: CPT

## 2024-09-15 PROCEDURE — 86703 HIV-1/HIV-2 1 RESULT ANTBDY: CPT

## 2024-09-15 RX ORDER — MINERAL OIL 1000 MG/ML
133 LIQUID TOPICAL ONCE
Refills: 0 | Status: COMPLETED | OUTPATIENT
Start: 2024-09-15 | End: 2024-09-15

## 2024-09-15 RX ORDER — LIDOCAINE HCL 20 MG/ML
10 VIAL (ML) INJECTION ONCE
Refills: 0 | Status: COMPLETED | OUTPATIENT
Start: 2024-09-15 | End: 2024-09-15

## 2024-09-15 RX ADMIN — MINERAL OIL 133 MILLILITER(S): 1000 LIQUID TOPICAL at 15:38

## 2024-09-15 RX ADMIN — Medication 10 MILLIGRAM(S): at 17:36

## 2024-09-15 NOTE — ED ADULT NURSE NOTE - OBJECTIVE STATEMENT
Pt c/o difficulty urinating and passing stools. Pt last bowel movement yesterday 9/14/24. Pt c/o LLQ/back  pain x2 days. Pt denies nausea/vomting. Pt pmh: hypertension, paecmeaker, stent. Pt

## 2024-09-15 NOTE — ED PROVIDER NOTE - PHYSICAL EXAMINATION
GENERAL: no acute distress; mildly uncomfortable appearing   HEAD:  Atraumatic, Normocephalic  EYES: EOMI, PERRLA,  ENT: MMM; oropharynx clear  NECK: Supple, No JVD  CHEST/LUNG: Clear to auscultation bilaterally; No wheeze  HEART: Regular rate and rhythm; No murmurs, rubs, or gallops  ABDOMEN: Soft, Nontender, Nondistended; Bowel sounds present  : Suprapubic fullness.   EXTREMITIES:  2+ Peripheral Pulses, No clubbing, cyanosis, or edema  PSYCH: AAOx3  NEUROLOGY: no focal motor or sensory deficits. 5/5 muscle strength in all extremities.   SKIN: No rashes or lesions

## 2024-09-15 NOTE — ED PROVIDER NOTE - NSFOLLOWUPINSTRUCTIONS_ED_ALL_ED_FT
1. Follow up with urology within 1 week for removal of catheter. Continue tamsulosin  2. Miralax daily and Senna twice daily   3. Follow up primary care doctor  4. Seek Medical attention or return to the emergency department for any new or worsening symptoms.

## 2024-09-15 NOTE — ED PROVIDER NOTE - PROGRESS NOTE DETAILS
No BM after enema, patient manually disimpacted and will give ducolax enema to reassess. [place]:  patient a bowel movement in the ED.  Will discharge.  Advised to follow-up with urology within 3 to 5 days for removal of Delarosa.  Advised to continue MiraLAX at home.  Return precautions discussed

## 2024-09-15 NOTE — ED PROVIDER NOTE - CARE PROVIDER_API CALL
Lukasz Hardy  Urology  [address] [place] [address]  Phone: [phone]  Fax: [phone]  Follow Up Time:

## 2024-09-15 NOTE — ED PROVIDER NOTE - OBJECTIVE STATEMENT
78 y/o M history of HTN, CVA, CAD, PPM presenting with urinary retention and constipation.     States has been able to urinate or have BM in 3 days. No nausea or vomiting- no fevers/chills. Unsure if ever had to have Delarosa catheter before. No dysuria. No chest pain or epigastric pain.

## 2024-09-15 NOTE — ED PROVIDER NOTE - CLINICAL SUMMARY MEDICAL DECISION MAKING FREE TEXT BOX
78 y/o M history of HTN, CVA, CAD, PPM presenting with urinary retention and constipation.   Distended bladder on bedside US- will place Delarosa  Enema, manual disimpaction if needed  Screening labs  Lipase reviewed-- no epigastric pain, n/v. Unlikely pancreatitis clinically.

## 2024-09-15 NOTE — ED PROVIDER NOTE - PATIENT PORTAL LINK FT
You can access the FollowMyHealth Patient Portal offered by [facility] by registering at the following website: [email or web address]. By joining [employer]’s FollowMyHealth portal, you will also be able to view your health information using other applications (apps) compatible with our system.

## 2024-09-15 NOTE — ED ADULT NURSE NOTE - IN ACCORDANCE WITH [place] LAW, WE OFFER EVERY PATIENT A HEPATITIS C TEST. WOULD YOU LIKE TO BE TESTED TODAY?
From: Jose Simon Jr.  To: Sincere Greenberg  Sent: 3/4/2024 6:02 PM CST  Subject: Dr. Velasquez    Can I schedule an appointment now or do I need to wait to hear from you?   Opt out

## 2024-09-15 NOTE — ED ADULT NURSE NOTE - NSFALLRISKINTERV_ED_ALL_ED

## 2024-09-15 NOTE — ED ADULT NURSE NOTE - CHPI ED NUR SYMPTOMS NEG
no anorexia/no bladder dysfunction/no bowel dysfunction/no difficulty bearing weight/no fatigue/no motor function loss/no neck tenderness/no numbness/no tingling

## 2024-09-15 NOTE — ED PROVIDER NOTE - NS ED MD DISPO DISCHARGE CCDA
11/29/2023  I, Elroy Riedel, DO, I have discussed the patient with the resident/non-physician practitioner and agree with the resident's/non-physician practitioner's findings, Plan of Care, and MDM as documented in the resident's/non-physician practitioner's note, except where noted. All available labs and Radiology studies were reviewed. I was present for key portions of any procedure(s) performed by the resident/non-physician practitioner and I was immediately available to provide assistance. Patient/Caregiver provided printed discharge information.

## 2024-09-17 ENCOUNTER — APPOINTMENT (OUTPATIENT)
Dept: UROLOGY | Facility: CLINIC | Age: 79
End: 2024-09-17
Payer: MEDICARE

## 2024-09-17 VITALS
WEIGHT: 120 LBS | SYSTOLIC BLOOD PRESSURE: 110 MMHG | BODY MASS INDEX: 20.49 KG/M2 | HEART RATE: 50 BPM | TEMPERATURE: 94.6 F | HEIGHT: 64 IN | DIASTOLIC BLOOD PRESSURE: 73 MMHG

## 2024-09-17 PROCEDURE — 99204 OFFICE O/P NEW MOD 45 MIN: CPT

## 2024-09-17 PROCEDURE — G2211 COMPLEX E/M VISIT ADD ON: CPT

## 2024-09-17 RX ORDER — SENNOSIDES 8.6 MG/1
CAPSULE, GELATIN COATED ORAL
Refills: 0 | Status: ACTIVE | COMMUNITY

## 2024-09-17 RX ORDER — LOSARTAN POTASSIUM 25 MG/1
25 TABLET, FILM COATED ORAL
Refills: 0 | Status: ACTIVE | COMMUNITY

## 2024-09-17 NOTE — PHYSICAL EXAM
[General Appearance - Well Developed] : well developed [General Appearance - Well Nourished] : well nourished [Abdomen Soft] : soft [Abdomen Tenderness] : non-tender [Normal Station and Gait] : the gait and station were normal for the patient's age [de-identified] : thakkar secure, draining clear yellow urine  [de-identified] : ambulates with cane

## 2024-09-17 NOTE — HISTORY OF PRESENT ILLNESS
[FreeTextEntry1] : 79M presents for initial evaluation of urinary retention  Referred by [facility]  PMH significant for: HTN, HLD, CAD  PSH significant for: pacemaker, cardiac stent, audi  Significant meds: ASA, atorvastatin, losartan, metoprolol   Seen in [facility] ED on 9/15 for urinary retention, constipation  No void or BM in 3 days   UA: WNL, Cr: 1.28, WBC: 5.4  Discharged home with Delarosa   First time urinary retention.  Has been taking flomax for 3 years.  Reports recently started Senna and fiber cereal Also reports taking tamsulosin 0.4 since before the episode but ran out of it and didn't take it for 3 days Restarted Tamsulosin last night.

## 2024-09-17 NOTE — ASSESSMENT
[FreeTextEntry1] :  Mr. Frost presents for initial evaluation of urinary retention (new diagnosis, uncertain prognosis) Daughter (Jie) is independent historian Underlying source presumed due to BPH Occurred after patient ran out of tamsulosin and became constipated Delarosa placed in ED (notes and labs reviewed)  BM yesterday, but has been on tamsulosin for less than 24 hours Recommendation is push back void trial for another 48 hours. Void trial today increases risk for repeat retention episode, especially if patient is so sensitive to tamsulosin  Recommendations -continue tamsulosin 0.4 mg qhs -RTC 3-4 days for void trial -may consider finasteride in the long term

## 2024-09-20 ENCOUNTER — APPOINTMENT (OUTPATIENT)
Dept: UROLOGY | Facility: CLINIC | Age: 79
End: 2024-09-20
Payer: MEDICARE

## 2024-09-20 VITALS
SYSTOLIC BLOOD PRESSURE: 127 MMHG | DIASTOLIC BLOOD PRESSURE: 83 MMHG | OXYGEN SATURATION: 97 % | HEART RATE: 49 BPM | TEMPERATURE: 97.52 F

## 2024-09-20 PROCEDURE — 99213 OFFICE O/P EST LOW 20 MIN: CPT | Mod: 25

## 2024-09-20 PROCEDURE — 51700 IRRIGATION OF BLADDER: CPT

## 2024-09-20 PROCEDURE — A4216: CPT | Mod: NC

## 2024-09-22 NOTE — HISTORY OF PRESENT ILLNESS
[FreeTextEntry1] :  06/09/2024  79M presents for initial evaluation of urinary retention Referred by [facility]  PMH significant for: HTN, HLD, CAD PSH significant for: pacemaker, cardiac stent, audi Significant meds: ASA, atorvastatin, losartan, metoprolol  Seen in [facility] ED on 9/15 for urinary retention, constipation No void or BM in 3 days UA: WNL, Cr: 1.28, WBC: 5.4 Discharged home with Delarosa  First time urinary retention. Has been taking flomax for 3 years. Reports recently started Senna and fiber cereal Also reports taking tamsulosin 0.4 since before the episode but ran out of it and didn't take it for 3 days Restarted Tamsulosin last night.  9/20/2024 cc:  79 year old male presents for a TOV

## 2024-09-22 NOTE — ASSESSMENT
[FreeTextEntry1] : 79 year old male with retention  tov successful today  cont tamsulosin  call for difficulty voiding

## 2024-09-22 NOTE — PHYSICAL EXAM
[Normal Appearance] : normal appearance [Well Groomed] : well groomed [General Appearance - In No Acute Distress] : no acute distress [Edema] : no peripheral edema [Respiration, Rhythm And Depth] : normal respiratory rhythm and effort [Exaggerated Use Of Accessory Muscles For Inspiration] : no accessory muscle use [Abdomen Soft] : soft [Costovertebral Angle Tenderness] : no ~M costovertebral angle tenderness [Abdomen Tenderness] : non-tender [Urinary Bladder Findings] : the bladder was normal on palpation [Normal Station and Gait] : the gait and station were normal for the patient's age [] : no rash [No Focal Deficits] : no focal deficits [Oriented To Time, Place, And Person] : oriented to person, place, and time [Affect] : the affect was normal [Mood] : the mood was normal [No Palpable Adenopathy] : no palpable adenopathy [de-identified] : thakkar clear urine

## 2024-09-22 NOTE — HISTORY OF PRESENT ILLNESS
[FreeTextEntry1] :  06/09/2024  79M presents for initial evaluation of urinary retention Referred by [facility]  PMH significant for: HTN, HLD, CAD PSH significant for: pacemaker, cardiac stent, audi Significant meds: ASA, atorvastatin, losartan, metoprolol  Seen in [facility] ED on 9/15 for urinary retention, constipation No void or BM in 3 days UA: WNL, Cr: 1.28, WBC: 5.4 Discharged home with Delarosa  First time urinary retention. Has been taking flomax for 3 years. Reports recently started Senna and fiber cereal Also reports taking tamsulosin 0.4 since before the episode but ran out of it and didn't take it for 3 days Restarted Tamsulosin last night.  9/20/2024 cc:  79 year old male presents for a TOV

## 2024-09-22 NOTE — PHYSICAL EXAM
[Normal Appearance] : normal appearance [Well Groomed] : well groomed [General Appearance - In No Acute Distress] : no acute distress [Edema] : no peripheral edema [Respiration, Rhythm And Depth] : normal respiratory rhythm and effort [Exaggerated Use Of Accessory Muscles For Inspiration] : no accessory muscle use [Abdomen Soft] : soft [Abdomen Tenderness] : non-tender [Costovertebral Angle Tenderness] : no ~M costovertebral angle tenderness [Urinary Bladder Findings] : the bladder was normal on palpation [Normal Station and Gait] : the gait and station were normal for the patient's age [] : no rash [No Focal Deficits] : no focal deficits [Oriented To Time, Place, And Person] : oriented to person, place, and time [Affect] : the affect was normal [Mood] : the mood was normal [No Palpable Adenopathy] : no palpable adenopathy [de-identified] : thakkar clear urine

## 2024-09-24 ENCOUNTER — EMERGENCY (EMERGENCY)
Facility: HOSPITAL | Age: 79
LOS: 1 days | Discharge: HOME | End: 2024-09-24
Attending: EMERGENCY MEDICINE
Payer: COMMERCIAL

## 2024-09-24 DIAGNOSIS — Z95.0 PRESENCE OF CARDIAC PACEMAKER: Chronic | ICD-10-CM

## 2024-09-24 DIAGNOSIS — Z90.49 ACQUIRED ABSENCE OF OTHER SPECIFIED PARTS OF DIGESTIVE TRACT: Chronic | ICD-10-CM

## 2024-09-24 DIAGNOSIS — I25.10 ATHEROSCLEROTIC HEART DISEASE OF NATIVE CORONARY ARTERY WITHOUT ANGINA PECTORIS: Chronic | ICD-10-CM

## 2024-09-24 PROCEDURE — 99284 EMERGENCY DEPT VISIT MOD MDM: CPT

## 2024-09-25 ENCOUNTER — APPOINTMENT (OUTPATIENT)
Dept: UROLOGY | Facility: CLINIC | Age: 79
End: 2024-09-25
Payer: MEDICARE

## 2024-09-25 VITALS
BODY MASS INDEX: 20.49 KG/M2 | RESPIRATION RATE: 15 BRPM | HEART RATE: 52 BPM | SYSTOLIC BLOOD PRESSURE: 113 MMHG | HEIGHT: 64 IN | TEMPERATURE: 206.78 F | DIASTOLIC BLOOD PRESSURE: 74 MMHG | WEIGHT: 120 LBS

## 2024-09-25 VITALS
DIASTOLIC BLOOD PRESSURE: 83 MMHG | TEMPERATURE: 98 F | WEIGHT: 109.79 LBS | HEIGHT: 64 IN | HEART RATE: 95 BPM | SYSTOLIC BLOOD PRESSURE: 135 MMHG | RESPIRATION RATE: 18 BRPM | OXYGEN SATURATION: 98 %

## 2024-09-25 VITALS — OXYGEN SATURATION: 91 %

## 2024-09-25 DIAGNOSIS — R33.9 RETENTION OF URINE, UNSPECIFIED: ICD-10-CM

## 2024-09-25 LAB
APPEARANCE UR: CLEAR — SIGNIFICANT CHANGE UP
BILIRUB UR-MCNC: NEGATIVE — SIGNIFICANT CHANGE UP
COLOR SPEC: YELLOW — SIGNIFICANT CHANGE UP
DIFF PNL FLD: ABNORMAL
GLUCOSE UR QL: NEGATIVE MG/DL — SIGNIFICANT CHANGE UP
KETONES UR-MCNC: NEGATIVE MG/DL — SIGNIFICANT CHANGE UP
LEUKOCYTE ESTERASE UR-ACNC: NEGATIVE — SIGNIFICANT CHANGE UP
NITRITE UR-MCNC: POSITIVE
PH UR: 6 — SIGNIFICANT CHANGE UP (ref 5–8)
PROT UR-MCNC: NEGATIVE MG/DL — SIGNIFICANT CHANGE UP
SP GR SPEC: 1.01 — SIGNIFICANT CHANGE UP (ref 1–1.03)
UROBILINOGEN FLD QL: 0.2 MG/DL — SIGNIFICANT CHANGE UP (ref 0.2–1)

## 2024-09-25 PROCEDURE — 81001 URINALYSIS AUTO W/SCOPE: CPT

## 2024-09-25 PROCEDURE — 51702 INSERT TEMP BLADDER CATH: CPT

## 2024-09-25 PROCEDURE — 99213 OFFICE O/P EST LOW 20 MIN: CPT

## 2024-09-25 PROCEDURE — 99283 EMERGENCY DEPT VISIT LOW MDM: CPT | Mod: 25

## 2024-09-25 RX ORDER — CEFUROXIME SODIUM 1.5 G
1 VIAL (EA) INJECTION
Qty: 14 | Refills: 0
Start: 2024-09-25 | End: 2024-10-01

## 2024-09-25 RX ORDER — CEFUROXIME SODIUM 1.5 G
250 VIAL (EA) INJECTION ONCE
Refills: 0 | Status: COMPLETED | OUTPATIENT
Start: 2024-09-25 | End: 2024-09-25

## 2024-09-25 RX ORDER — TAMSULOSIN HYDROCHLORIDE 0.4 MG/1
1 CAPSULE ORAL
Qty: 7 | Refills: 0
Start: 2024-09-25 | End: 2024-10-01

## 2024-09-25 RX ADMIN — Medication 250 MILLIGRAM(S): at 05:01

## 2024-09-25 NOTE — ED PROVIDER NOTE - NSFOLLOWUPINSTRUCTIONS_ED_ALL_ED_FT
Acute urinary retention is a condition in which a person is unable to pass urine or can only pass a little urine. This condition can happen suddenly and last for a short time. If left untreated, it can become long-term (chronic) and result in kidney damage or other serious complications.    What are the causes?  This condition may be caused by:  Obstruction or narrowing of the tube that drains the bladder (urethra). This may be caused by surgery, problems with nearby organs, or injury to the bladder or urethra.  Problems with the nerves in the bladder.  Tumors in the area of the pelvis, bladder, or urethra.  Certain medicines.  Bladder or urinary tract infection.  Constipation.  What increases the risk?  This condition is more likely to develop in older men. As men age, their prostate may become larger and may start to press or squeeze on the bladder or the urethra. Other chronic health conditions can increase the risk of acute urinary retention. These include:  Diseases such as multiple sclerosis.  Spinal cord injuries.  Diabetes.  Degenerative cognitive conditions, such as delirium or dementia.  Psychological conditions. A man may hold his urine due to trauma or because he does not want to use the bathroom.  What are the signs or symptoms?  Symptoms of this condition include:  Trouble urinating.  Pain in the lower abdomen.  How is this diagnosed?  This condition is diagnosed based on a physical exam and your medical history. You may also have other tests, including:  An ultrasound of the bladder or kidneys or both.  Blood tests.  A urine analysis.  Additional tests may be needed, such as a CT scan, MRI, and kidney or bladder function tests.  How is this treated?  Treatment for this condition may include:  Medicines.  Placing a thin, sterile tube (catheter) into the bladder to drain urine out of the body. This is called an indwelling urinary catheter. After it is inserted, the catheter is held in place with a small balloon that is filled with sterile water. Urine drains from the catheter into a collection bag outside of the body.  Behavioral therapy.  Treatment for other conditions.  If needed, you may be treated in the hospital for kidney function problems or to manage other complications.    Follow these instructions at home:  Medicines    Take over-the-counter and prescription medicines only as told by your health care provider. Avoid certain medicines, such as decongestants, antihistamines, and some prescription medicines. Do not take any medicine unless your health care provider approves.  If you were prescribed an antibiotic medicine, take it as told by your health care provider. Do not stop using the antibiotic even if you start to feel better.  General instructions    Do not use any products that contain nicotine or tobacco. These products include cigarettes, chewing tobacco, and vaping devices, such as e-cigarettes. If you need help quitting, ask your health care provider.  Drink enough fluid to keep your urine pale yellow.  If you have an indwelling urinary catheter, follow the instructions from your health care provider.  Monitor any changes in your symptoms. Tell your health care provider about any changes.  If instructed, monitor your blood pressure at home. Report changes as told by your health care provider.  Keep all follow-up visits. This is important.  Contact a health care provider if:  You have uncomfortable bladder contractions that you cannot control (spasms).  You leak urine with the spasms.  Get help right away if:  You have chills or a fever.  You have blood in your urine.  You have a catheter and the following happens:  Your catheter stops draining urine.  Your catheter falls out.  Summary  Acute urinary retention is a condition in which a person is unable to pass urine or can only pass a little urine. If left untreated, this condition can result in kidney damage or other serious complications.  An enlarged prostate may cause this condition. As men age, their prostate gland may become larger and may press or squeeze on the bladder or the urethra.  Treatment for this condition may include medicines and placement of an indwelling urinary catheter.  Monitor any changes in your symptoms. Tell your health care provider about any changes.  This information is not intended to replace advice given to you by your health care provider. Make sure you discuss any questions you have with your health care provider.

## 2024-09-25 NOTE — ED ADULT NURSE NOTE - NSICDXPASTSURGICALHX_GEN_ALL_CORE_FT
Patient's wife Brenda came into the office   PAST SURGICAL HISTORY:  Coronary artery disease CABG x 3 in 1994 in [place]    Pacemaker see medical for information on pacemaker    S/P cholecystectomy

## 2024-09-25 NOTE — ED PROVIDER NOTE - PATIENT PORTAL LINK FT
You can access the FollowMyHealth Patient Portal offered by [facility] by registering at the following website: [email or web address]. By joining [employer]’s FollowMyHealth portal, you will also be able to view your health information using other applications (apps) compatible with our system.

## 2024-09-25 NOTE — ED PROVIDER NOTE - CARE PROVIDER_API CALL
Lukasz Hardy  Urology  [address] [place] [address]  Phone: [phone]  Fax: [phone]  Follow Up Time:

## 2024-09-25 NOTE — ED ADULT NURSE NOTE - NSFALLUNIVINTERV_ED_ALL_ED
Bed/Stretcher in lowest position, wheels locked, appropriate side rails in place/Call bell, personal items and telephone in reach/Instruct patient to call for assistance before getting out of bed/chair/stretcher/Non-slip footwear applied when patient is off stretcher/Diagonal to call system/Physically safe environment - no spills, clutter or unnecessary equipment/Purposeful proactive rounding/Room/bathroom lighting operational, light cord in reach

## 2024-09-25 NOTE — ED ADULT NURSE NOTE - OBJECTIVE STATEMENT
pt c/o 3 days of urinary retention. pt c/o lower abd/pelvic discomfort/pressure. pt states he has had a thakkar in the past. pt denies fever, chills.

## 2024-09-25 NOTE — ED ADULT NURSE NOTE - NSICDXPASTMEDICALHX_GEN_ALL_CORE_FT
PAST MEDICAL HISTORY:  BPH (benign prostatic hypertrophy)     Coronary artery disease stents    CVA (cerebral vascular accident) 10-15 years ago -- No residual    Difficult intubation patient has notation with pacemaker information that he is a difficult intubation but no specifics given    Hyperlipidemia     Hypertension     Sinoatrial node dysfunction guidant pacemaker  [employer] I Entra SSIRO IS-1, Comp, 1198, [id number]  leads Bipolar 3.2 LP Passive RV 52, 59 cm, 4260, [id number]  implanted oon 11/21/05 by Dr. Clark at [facility]    Snoring SWATI precautions -- responds affirmatively to STOP BANG questionnaire -- admits to intermittent snoring; age > 50; gender, male; h/o htn    Vasovagal syncope

## 2024-09-25 NOTE — ED PROVIDER NOTE - OBJECTIVE STATEMENT
79-year-old male accompanied with wife, patient with difficulty voiding for past 3 days due to urinary retention.  No reported fever, no vomiting.  Patient with 750 cc retained urine on bladder scan upon arrival.  Delarosa catheter placed, clear yellow urine output noted patient feels much better.  Patient with previous history of urinary retention due to BPH..  Will prescribe Flomax,  Placed leg bag and patient to follow-up with urologist. I spoke to patient's wife and patient's daughter who agrees with plan.

## 2024-09-25 NOTE — ED ADULT NURSE NOTE - BIRTH SEX
[de-identified] : Right shoulder exam\par \par Inspection: No swelling\par Skin: Incisions C/D/I, no drainage, healed\par AROM: , ABD 70, ER 30, IR mid lumbar\par Painful arc ROM: Further PROM\par Tenderness: Tenderness throughout the shoulder girdle\par Strength: 4/5 ER, 4/5 IR, 3/5 FF\par Vasc: 2+ radial pulse\par Neuro: AIN, PIN, Ulnar nerve in tact to motor\par Sensation: In tact to light touch throughout
Male

## 2024-09-25 NOTE — ED PROVIDER NOTE - CLINICAL SUMMARY MEDICAL DECISION MAKING FREE TEXT BOX
Patient with urinary retention, leg bag placed, will refill Flomax.  Patient to follow-up with urologist  Pt is well appearing, has no new complaints and able to walk with normal gait. Pt is stable for discharge and follow up with medical doctor. Pt educated on care and need for follow up. Discussed anticipatory guidance and return precautions. Questions answered. I had a detailed discussion with the patient regarding the historical points, exam findings, and any diagnostic results supporting the discharge diagnosis. Patient with urinary retention, leg bag placed, will refill Flomax.  Patient to follow-up with urologist  Pt is well appearing, has no new complaints and able to walk with normal gait. Pt is stable for discharge and follow up with medical doctor. Pt educated on care and need for follow up. Discussed anticipatory guidance and return precautions. Questions answered. I had a detailed discussion with the patient regarding the historical points, exam findings, and any diagnostic results supporting the discharge diagnosis.    UA consistent with UTI, positive nitrites.  No allergic reaction noted to Ceftin p.o. in ED.  Will prescribe Ceftin.

## 2024-09-25 NOTE — ED PROVIDER NOTE - NSICDXPASTMEDICALHX_GEN_ALL_CORE_FT
PAST MEDICAL HISTORY:  BPH (benign prostatic hypertrophy)     Coronary artery disease stents    CVA (cerebral vascular accident) 10-15 years ago -- No residual    Difficult intubation patient has notation with pacemaker information that he is a difficult intubation but no specifics given    Hyperlipidemia     Hypertension     Sinoatrial node dysfunction guidant pacemaker  [employer] I Entra SSIRO IS-1, Comp, 1198, [id number]  leads Bipolar 3.2 LP Passive RV 52, 59 cm, 4260, [id number]  implanted oon 11/21/05 by Dr. Clark at [facility]    Snoring SWATI precautions -- responds affirmatively to STOP BANG questionnaire -- admits to intermittent snoring; age > 50; gender, male; h/o htn    Vasovagal syncope

## 2024-09-29 PROBLEM — R33.9 URINARY RETENTION: Status: ACTIVE | Noted: 2024-09-17

## 2024-09-29 NOTE — PHYSICAL EXAM
[Normal Appearance] : normal appearance [Well Groomed] : well groomed [General Appearance - In No Acute Distress] : no acute distress [Edema] : no peripheral edema [Respiration, Rhythm And Depth] : normal respiratory rhythm and effort [Exaggerated Use Of Accessory Muscles For Inspiration] : no accessory muscle use [Abdomen Soft] : soft [Abdomen Tenderness] : non-tender [Costovertebral Angle Tenderness] : no ~M costovertebral angle tenderness [Urinary Bladder Findings] : the bladder was normal on palpation [Normal Station and Gait] : the gait and station were normal for the patient's age [] : no rash [No Focal Deficits] : no focal deficits [Affect] : the affect was normal [Oriented To Time, Place, And Person] : oriented to person, place, and time [Mood] : the mood was normal [No Palpable Adenopathy] : no palpable adenopathy [de-identified] : thakkar clear urine

## 2024-09-29 NOTE — HISTORY OF PRESENT ILLNESS
[FreeTextEntry1] : 06/09/2024  79M presents for initial evaluation of urinary retention Referred by [facility]  PMH significant for: HTN, HLD, CAD PSH significant for: pacemaker, cardiac stent, audi Significant meds: ASA, atorvastatin, losartan, metoprolol  Seen in [facility] ED on 9/15 for urinary retention, constipation No void or BM in 3 days UA: WNL, Cr: 1.28, WBC: 5.4 Discharged home with Delarosa  First time urinary retention. Has been taking flomax for 3 years. Reports recently started Senna and fiber cereal Also reports taking tamsulosin 0.4 since before the episode but ran out of it and didn't take it for 3 days Restarted Tamsulosin last night.  9/20/2024 cc: TOV  79 year old male presents for a TOV  09/25/2024 cc: Pt is a 79 year old male who presents for f/u  cath removed last visit voided but had to return for r replacment

## 2024-09-29 NOTE — END OF VISIT
[FreeTextEntry4] : This note was written by Anival Giron on 09/25/2024 actively solely Kd Alvarez M.D. I, Anival Giron, am scribing for and in the presence of Kd Alvarez M.D. in the following sections HISTORY OF PRESENT ILLNESS, PAST MEDICAL/FAMILY/SOCIAL HISTORY; REVIEW OF SYSTEMS; VITAL SIGNS; PHYSICAL EXAM; ASSESSMENT/PLAN. All medical record entries made by this scribe at , Kd Alvarez M.D. direction and personally dictated by me on 09/25/2024. I personally performed the services described in the documentation, reviewed the documentation recorded by the scribe in my presence, and it accurately and completely records my words and actions.

## 2024-09-29 NOTE — HISTORY OF PRESENT ILLNESS
[FreeTextEntry1] : 06/09/2024  79M presents for initial evaluation of urinary retention Referred by [facility]  PMH significant for: HTN, HLD, CAD PSH significant for: pacemaker, cardiac stent, audi Significant meds: ASA, atorvastatin, losartan, metoprolol  Seen in [facility] ED on 9/15 for urinary retention, constipation No void or BM in 3 days UA: WNL, Cr: 1.28, WBC: 5.4 Discharged home with Delarosa  First time urinary retention. Has been taking flomax for 3 years. Reports recently started Senna and fiber cereal Also reports taking tamsulosin 0.4 since before the episode but ran out of it and didn't take it for 3 days Restarted Tamsulosin last night.  9/20/2024 cc: TOV  79 year old male presents for a TOV  09/25/2024 cc: Pt is a 79 year old male who presents for f/u  cath removed last visit voided but had to return for r replacment

## 2024-09-29 NOTE — PHYSICAL EXAM
[Normal Appearance] : normal appearance [Well Groomed] : well groomed [General Appearance - In No Acute Distress] : no acute distress [Edema] : no peripheral edema [Respiration, Rhythm And Depth] : normal respiratory rhythm and effort [Exaggerated Use Of Accessory Muscles For Inspiration] : no accessory muscle use [Abdomen Soft] : soft [Abdomen Tenderness] : non-tender [Costovertebral Angle Tenderness] : no ~M costovertebral angle tenderness [Urinary Bladder Findings] : the bladder was normal on palpation [Normal Station and Gait] : the gait and station were normal for the patient's age [] : no rash [No Focal Deficits] : no focal deficits [Oriented To Time, Place, And Person] : oriented to person, place, and time [Affect] : the affect was normal [Mood] : the mood was normal [No Palpable Adenopathy] : no palpable adenopathy [de-identified] : thakkar clear urine

## 2024-10-02 ENCOUNTER — APPOINTMENT (OUTPATIENT)
Dept: UROLOGY | Facility: CLINIC | Age: 79
End: 2024-10-02
Payer: MEDICARE

## 2024-10-02 VITALS
RESPIRATION RATE: 15 BRPM | DIASTOLIC BLOOD PRESSURE: 75 MMHG | OXYGEN SATURATION: 91 % | WEIGHT: 120 LBS | TEMPERATURE: 206.96 F | BODY MASS INDEX: 20.49 KG/M2 | SYSTOLIC BLOOD PRESSURE: 115 MMHG | HEIGHT: 64 IN | HEART RATE: 66 BPM

## 2024-10-02 PROCEDURE — 99213 OFFICE O/P EST LOW 20 MIN: CPT

## 2024-10-03 ENCOUNTER — APPOINTMENT (OUTPATIENT)
Dept: UROLOGY | Facility: CLINIC | Age: 79
End: 2024-10-03

## 2024-10-03 VITALS
OXYGEN SATURATION: 95 % | BODY MASS INDEX: 20.49 KG/M2 | HEART RATE: 50 BPM | TEMPERATURE: 207.14 F | WEIGHT: 120 LBS | DIASTOLIC BLOOD PRESSURE: 70 MMHG | SYSTOLIC BLOOD PRESSURE: 103 MMHG | RESPIRATION RATE: 16 BRPM | HEIGHT: 64 IN

## 2024-10-03 DIAGNOSIS — R33.9 RETENTION OF URINE, UNSPECIFIED: ICD-10-CM

## 2024-10-03 PROCEDURE — 99213 OFFICE O/P EST LOW 20 MIN: CPT

## 2024-10-07 NOTE — HISTORY OF PRESENT ILLNESS
[FreeTextEntry1] : 06/09/2024  79M presents for initial evaluation of urinary retention Referred by [facility]  PMH significant for: HTN, HLD, CAD PSH significant for: pacemaker, cardiac stent, audi Significant meds: ASA, atorvastatin, losartan, metoprolol  Seen in [facility] ED on 9/15 for urinary retention, constipation No void or BM in 3 days UA: WNL, Cr: 1.28, WBC: 5.4 Discharged home with Delarosa  First time urinary retention. Has been taking flomax for 3 years. Reports recently started Senna and fiber cereal Also reports taking tamsulosin 0.4 since before the episode but ran out of it and didn't take it for 3 days Restarted Tamsulosin last night.  9/20/2024 cc: TOV  79 year old male presents for a TOV  09/25/2024 cc: Pt is a 79 year old male who presents for f/u cath removed last visit voided but had to return for r replacment  10/02/2024 cc: f/u visit  Pt is a 79 year old male presenting for f/u visit. Pt's daughter reports catheter was removed 2 hours ago, but it was supposed to be removed in the morning. Pt reports having no urge to pee

## 2024-10-07 NOTE — HISTORY OF PRESENT ILLNESS
[FreeTextEntry1] : 06/09/2024  79M presents for initial evaluation of urinary retention [facility]  PMH significant for: HTN, HLD, CAD PSH significant for: pacemaker, cardiac stent, audi Significant meds: ASA, atorvastatin, losartan, metoprolol  [facility] urinary [facility], constipation No void or BM in 3 days UA: WNL, Cr: 1.28, WBC: 5.4 Discharged home with Delarosa  First time urinary retention. Has been taking flomax for 3 years. Reports recently started Senna and fiber cereal Also reports taking tamsulosin 0.4 since before the episode but ran out of it and didn't take it for 3 days Restarted Tamsulosin last night.  9/20/2024 cc: TOV  79 year old male presents for a TOV  09/25/2024 cc: Pt is a 79 year old male who presents for f/u cath removed last visit voided but had to return for r replacment  10/02/2024 cc: f/u visit  Pt is a 79 year old male presenting for f/u visit. Pt's daughter reports catheter was removed 2 hours ago, but it was supposed to be removed in the morning. Pt reports having no urge to pee

## 2024-10-07 NOTE — ASSESSMENT
[FreeTextEntry1] : Pt is a 79 year old male with f/u visit. pt with no urge to void but daughter removed cath only 2 hours ago  does not want cath replaced   RTO tomorrow for TOV

## 2024-10-07 NOTE — END OF VISIT
[FreeTextEntry4] : This note was written by Anival Giron on 10/02/2024 actively solely Kd Alvarez M.D. I, Anival Giron, am scribing for and in the presence of Kd Alvarez M.D. in the following sections HISTORY OF PRESENT ILLNESS, PAST MEDICAL/FAMILY/SOCIAL HISTORY; REVIEW OF SYSTEMS; VITAL SIGNS; PHYSICAL EXAM; ASSESSMENT/PLAN. All medical record entries made by this scribe at , Kd Alvarez M.D. direction and personally dictated by me on 10/02/2024. I personally performed the services described in the documentation, reviewed the documentation recorded by the scribe in my presence, and it accurately and completely records my words and actions.

## 2024-10-10 ENCOUNTER — APPOINTMENT (OUTPATIENT)
Dept: UROLOGY | Facility: CLINIC | Age: 79
End: 2024-10-10

## 2024-10-10 VITALS
WEIGHT: 120 LBS | DIASTOLIC BLOOD PRESSURE: 91 MMHG | OXYGEN SATURATION: 95 % | HEIGHT: 64 IN | TEMPERATURE: 209.48 F | SYSTOLIC BLOOD PRESSURE: 126 MMHG | HEART RATE: 96 BPM | BODY MASS INDEX: 20.49 KG/M2

## 2024-10-10 PROCEDURE — 99214 OFFICE O/P EST MOD 30 MIN: CPT | Mod: 25

## 2024-10-10 PROCEDURE — 51702 INSERT TEMP BLADDER CATH: CPT

## 2024-10-10 RX ORDER — FINASTERIDE 5 MG/1
5 TABLET, FILM COATED ORAL
Qty: 30 | Refills: 2 | Status: ACTIVE | COMMUNITY
Start: 2024-10-10 | End: 1900-01-01

## 2024-10-11 ENCOUNTER — NON-APPOINTMENT (OUTPATIENT)
Age: 79
End: 2024-10-11

## 2024-10-13 NOTE — HISTORY OF PRESENT ILLNESS
[FreeTextEntry1] : 80 yo M with history of urinary retention managed by Dr. Ramirez s/p trial of void yesterday No complaints today Feels like he is voiding well

## 2024-10-13 NOTE — ASSESSMENT
[FreeTextEntry1] : 78 yo M with history of urinary retention  - PVR = 174ml - Reviewed indications for seeking immediate medical attention - FU in 1 month with Dr. Ramirez

## 2024-10-13 NOTE — PHYSICAL EXAM
[Normal Appearance] : normal appearance [Well Groomed] : well groomed [Edema] : no peripheral edema [General Appearance - In No Acute Distress] : no acute distress [Respiration, Rhythm And Depth] : normal respiratory rhythm and effort [Exaggerated Use Of Accessory Muscles For Inspiration] : no accessory muscle use [Abdomen Soft] : soft [Abdomen Tenderness] : non-tender [Costovertebral Angle Tenderness] : no ~M costovertebral angle tenderness [Urinary Bladder Findings] : the bladder was normal on palpation [] : no rash [Oriented To Time, Place, And Person] : oriented to person, place, and time [Affect] : the affect was normal [Mood] : the mood was normal

## 2024-10-13 NOTE — ASSESSMENT
[FreeTextEntry1] : 80 yo M with history of urinary retention  - PVR = 174ml - Reviewed indications for seeking immediate medical attention - FU in 1 month with Dr. Ramirez

## 2024-10-30 ENCOUNTER — APPOINTMENT (OUTPATIENT)
Dept: UROLOGY | Facility: CLINIC | Age: 79
End: 2024-10-30
Payer: MEDICARE

## 2024-10-30 ENCOUNTER — NON-APPOINTMENT (OUTPATIENT)
Age: 79
End: 2024-10-30

## 2024-10-30 VITALS
DIASTOLIC BLOOD PRESSURE: 76 MMHG | BODY MASS INDEX: 18.44 KG/M2 | HEART RATE: 50 BPM | OXYGEN SATURATION: 98 % | TEMPERATURE: 98.1 F | WEIGHT: 108 LBS | SYSTOLIC BLOOD PRESSURE: 118 MMHG | HEIGHT: 64 IN

## 2024-10-30 DIAGNOSIS — R33.9 RETENTION OF URINE, UNSPECIFIED: ICD-10-CM

## 2024-10-30 PROCEDURE — G2211 COMPLEX E/M VISIT ADD ON: CPT

## 2024-10-30 PROCEDURE — 99213 OFFICE O/P EST LOW 20 MIN: CPT

## 2024-11-07 ENCOUNTER — APPOINTMENT (OUTPATIENT)
Dept: UROLOGY | Facility: CLINIC | Age: 79
End: 2024-11-07

## 2024-11-20 ENCOUNTER — APPOINTMENT (OUTPATIENT)
Dept: UROLOGY | Facility: CLINIC | Age: 79
End: 2024-11-20

## 2024-12-04 ENCOUNTER — APPOINTMENT (OUTPATIENT)
Dept: UROLOGY | Facility: CLINIC | Age: 79
End: 2024-12-04
Payer: MEDICARE

## 2024-12-04 VITALS
DIASTOLIC BLOOD PRESSURE: 83 MMHG | OXYGEN SATURATION: 100 % | HEART RATE: 82 BPM | TEMPERATURE: 93.7 F | SYSTOLIC BLOOD PRESSURE: 127 MMHG | BODY MASS INDEX: 18.44 KG/M2 | WEIGHT: 108 LBS | HEIGHT: 64 IN

## 2024-12-04 DIAGNOSIS — R33.9 RETENTION OF URINE, UNSPECIFIED: ICD-10-CM

## 2024-12-04 PROCEDURE — G2211 COMPLEX E/M VISIT ADD ON: CPT

## 2024-12-04 PROCEDURE — 99214 OFFICE O/P EST MOD 30 MIN: CPT

## 2024-12-06 LAB
APPEARANCE: ABNORMAL
BACTERIA: ABNORMAL /HPF
BILIRUBIN URINE: NEGATIVE
BLOOD URINE: ABNORMAL
CAST: 2 /LPF
COLOR: YELLOW
EPITHELIAL CELLS: 0 /HPF
GLUCOSE QUALITATIVE U: NEGATIVE MG/DL
KETONES URINE: NEGATIVE MG/DL
LEUKOCYTE ESTERASE URINE: ABNORMAL
MICROSCOPIC-UA: NORMAL
NITRITE URINE: POSITIVE
PH URINE: 6
PROTEIN URINE: 30 MG/DL
RED BLOOD CELLS URINE: 1 /HPF
REVIEW: NORMAL
SPECIFIC GRAVITY URINE: 1.02
UROBILINOGEN URINE: 0.2 MG/DL
WHITE BLOOD CELLS URINE: >998 /HPF

## 2024-12-06 RX ORDER — SULFAMETHOXAZOLE AND TRIMETHOPRIM 800; 160 MG/1; MG/1
800-160 TABLET ORAL TWICE DAILY
Qty: 10 | Refills: 0 | Status: ACTIVE | COMMUNITY
Start: 2024-12-06 | End: 1900-01-01

## 2024-12-09 LAB — BACTERIA UR CULT: ABNORMAL

## 2025-01-24 ENCOUNTER — INPATIENT (INPATIENT)
Facility: HOSPITAL | Age: 80
LOS: 0 days | Discharge: HOME | DRG: 312 | End: 2025-01-25
Attending: INTERNAL MEDICINE | Admitting: INTERNAL MEDICINE
Payer: COMMERCIAL

## 2025-01-24 VITALS
HEIGHT: 64 IN | WEIGHT: 139.99 LBS | RESPIRATION RATE: 18 BRPM | OXYGEN SATURATION: 96 % | SYSTOLIC BLOOD PRESSURE: 116 MMHG | HEART RATE: 76 BPM | DIASTOLIC BLOOD PRESSURE: 82 MMHG | TEMPERATURE: 98 F

## 2025-01-24 DIAGNOSIS — I63.9 CEREBRAL INFARCTION, UNSPECIFIED: ICD-10-CM

## 2025-01-24 DIAGNOSIS — R55 SYNCOPE AND COLLAPSE: ICD-10-CM

## 2025-01-24 DIAGNOSIS — I25.10 ATHEROSCLEROTIC HEART DISEASE OF NATIVE CORONARY ARTERY WITHOUT ANGINA PECTORIS: ICD-10-CM

## 2025-01-24 DIAGNOSIS — Z95.0 PRESENCE OF CARDIAC PACEMAKER: Chronic | ICD-10-CM

## 2025-01-24 DIAGNOSIS — I10 ESSENTIAL (PRIMARY) HYPERTENSION: ICD-10-CM

## 2025-01-24 DIAGNOSIS — I25.10 ATHEROSCLEROTIC HEART DISEASE OF NATIVE CORONARY ARTERY WITHOUT ANGINA PECTORIS: Chronic | ICD-10-CM

## 2025-01-24 DIAGNOSIS — Z90.49 ACQUIRED ABSENCE OF OTHER SPECIFIED PARTS OF DIGESTIVE TRACT: Chronic | ICD-10-CM

## 2025-01-24 DIAGNOSIS — E78.5 HYPERLIPIDEMIA, UNSPECIFIED: ICD-10-CM

## 2025-01-24 DIAGNOSIS — Z95.0 PRESENCE OF CARDIAC PACEMAKER: ICD-10-CM

## 2025-01-24 DIAGNOSIS — Z29.9 ENCOUNTER FOR PROPHYLACTIC MEASURES, UNSPECIFIED: ICD-10-CM

## 2025-01-24 DIAGNOSIS — N40.0 BENIGN PROSTATIC HYPERPLASIA WITHOUT LOWER URINARY TRACT SYMPTOMS: ICD-10-CM

## 2025-01-24 LAB
ALBUMIN SERPL ELPH-MCNC: 3.2 G/DL — LOW (ref 3.5–5)
ALP SERPL-CCNC: 79 U/L — SIGNIFICANT CHANGE UP (ref 40–120)
ALT FLD-CCNC: 29 U/L DA — SIGNIFICANT CHANGE UP (ref 10–60)
ANION GAP SERPL CALC-SCNC: 6 MMOL/L — SIGNIFICANT CHANGE UP (ref 5–17)
AST SERPL-CCNC: 47 U/L — HIGH (ref 10–40)
BASOPHILS # BLD AUTO: 0.04 K/UL — SIGNIFICANT CHANGE UP (ref 0–0.2)
BASOPHILS NFR BLD AUTO: 0.8 % — SIGNIFICANT CHANGE UP (ref 0–2)
BILIRUB SERPL-MCNC: 2 MG/DL — HIGH (ref 0.2–1.2)
BUN SERPL-MCNC: 27 MG/DL — HIGH (ref 7–18)
CALCIUM SERPL-MCNC: 9 MG/DL — SIGNIFICANT CHANGE UP (ref 8.4–10.5)
CHLORIDE SERPL-SCNC: 107 MMOL/L — SIGNIFICANT CHANGE UP (ref 96–108)
CO2 SERPL-SCNC: 22 MMOL/L — SIGNIFICANT CHANGE UP (ref 22–31)
CREAT SERPL-MCNC: 1.22 MG/DL — SIGNIFICANT CHANGE UP (ref 0.5–1.3)
EGFR: 60 ML/MIN/1.73M2 — SIGNIFICANT CHANGE UP
EGFR: 60 ML/MIN/1.73M2 — SIGNIFICANT CHANGE UP
EOSINOPHIL # BLD AUTO: 0.05 K/UL — SIGNIFICANT CHANGE UP (ref 0–0.5)
EOSINOPHIL NFR BLD AUTO: 1.1 % — SIGNIFICANT CHANGE UP (ref 0–6)
FLUAV AG NPH QL: SIGNIFICANT CHANGE UP
FLUBV AG NPH QL: SIGNIFICANT CHANGE UP
GLUCOSE SERPL-MCNC: 173 MG/DL — HIGH (ref 70–99)
HCT VFR BLD CALC: 40.1 % — SIGNIFICANT CHANGE UP (ref 39–50)
HGB BLD-MCNC: 13.3 G/DL — SIGNIFICANT CHANGE UP (ref 13–17)
IMM GRANULOCYTES NFR BLD AUTO: 0.2 % — SIGNIFICANT CHANGE UP (ref 0–0.9)
LYMPHOCYTES # BLD AUTO: 1.64 K/UL — SIGNIFICANT CHANGE UP (ref 1–3.3)
LYMPHOCYTES # BLD AUTO: 34.5 % — SIGNIFICANT CHANGE UP (ref 13–44)
MAGNESIUM SERPL-MCNC: 2.1 MG/DL — SIGNIFICANT CHANGE UP (ref 1.6–2.6)
MCHC RBC-ENTMCNC: 29.7 PG — SIGNIFICANT CHANGE UP (ref 27–34)
MCHC RBC-ENTMCNC: 33.2 G/DL — SIGNIFICANT CHANGE UP (ref 32–36)
MCV RBC AUTO: 89.5 FL — SIGNIFICANT CHANGE UP (ref 80–100)
MONOCYTES # BLD AUTO: 0.52 K/UL — SIGNIFICANT CHANGE UP (ref 0–0.9)
MONOCYTES NFR BLD AUTO: 10.9 % — SIGNIFICANT CHANGE UP (ref 2–14)
NEUTROPHILS # BLD AUTO: 2.5 K/UL — SIGNIFICANT CHANGE UP (ref 1.8–7.4)
NEUTROPHILS NFR BLD AUTO: 52.5 % — SIGNIFICANT CHANGE UP (ref 43–77)
NRBC # BLD: 0 /100 WBCS — SIGNIFICANT CHANGE UP (ref 0–0)
NRBC BLD-RTO: 0 /100 WBCS — SIGNIFICANT CHANGE UP (ref 0–0)
PLATELET # BLD AUTO: 107 K/UL — LOW (ref 150–400)
POTASSIUM SERPL-MCNC: 4.9 MMOL/L — SIGNIFICANT CHANGE UP (ref 3.5–5.3)
POTASSIUM SERPL-SCNC: 4.9 MMOL/L — SIGNIFICANT CHANGE UP (ref 3.5–5.3)
PROT SERPL-MCNC: 6.7 G/DL — SIGNIFICANT CHANGE UP (ref 6–8.3)
RBC # BLD: 4.48 M/UL — SIGNIFICANT CHANGE UP (ref 4.2–5.8)
RBC # FLD: 14.3 % — SIGNIFICANT CHANGE UP (ref 10.3–14.5)
RSV RNA NPH QL NAA+NON-PROBE: SIGNIFICANT CHANGE UP
SARS-COV-2 RNA SPEC QL NAA+PROBE: SIGNIFICANT CHANGE UP
SODIUM SERPL-SCNC: 135 MMOL/L — SIGNIFICANT CHANGE UP (ref 135–145)
TROPONIN I, HIGH SENSITIVITY RESULT: 6.4 NG/L — SIGNIFICANT CHANGE UP
TROPONIN I, HIGH SENSITIVITY RESULT: 8.4 NG/L — SIGNIFICANT CHANGE UP
WBC # BLD: 4.76 K/UL — SIGNIFICANT CHANGE UP (ref 3.8–10.5)
WBC # FLD AUTO: 4.76 K/UL — SIGNIFICANT CHANGE UP (ref 3.8–10.5)

## 2025-01-24 PROCEDURE — 93279 PRGRMG DEV EVAL PM/LDLS PM: CPT | Mod: 26

## 2025-01-24 PROCEDURE — 71045 X-RAY EXAM CHEST 1 VIEW: CPT | Mod: 26

## 2025-01-24 PROCEDURE — 72125 CT NECK SPINE W/O DYE: CPT | Mod: 26

## 2025-01-24 PROCEDURE — 70450 CT HEAD/BRAIN W/O DYE: CPT | Mod: 26

## 2025-01-24 PROCEDURE — 72131 CT LUMBAR SPINE W/O DYE: CPT | Mod: 26

## 2025-01-24 PROCEDURE — 72192 CT PELVIS W/O DYE: CPT | Mod: 26

## 2025-01-24 PROCEDURE — 99285 EMERGENCY DEPT VISIT HI MDM: CPT

## 2025-01-24 RX ORDER — MELATONIN 5 MG
3 TABLET ORAL AT BEDTIME
Refills: 0 | Status: DISCONTINUED | OUTPATIENT
Start: 2025-01-24 | End: 2025-01-25

## 2025-01-24 RX ORDER — ACETAMINOPHEN 500 MG/5ML
650 LIQUID (ML) ORAL EVERY 6 HOURS
Refills: 0 | Status: DISCONTINUED | OUTPATIENT
Start: 2025-01-24 | End: 2025-01-25

## 2025-01-24 RX ORDER — ATORVASTATIN CALCIUM 80 MG/1
80 TABLET, FILM COATED ORAL AT BEDTIME
Refills: 0 | Status: DISCONTINUED | OUTPATIENT
Start: 2025-01-24 | End: 2025-01-25

## 2025-01-24 RX ORDER — FINASTERIDE 1 MG/1
5 TABLET, FILM COATED ORAL DAILY
Refills: 0 | Status: DISCONTINUED | OUTPATIENT
Start: 2025-01-24 | End: 2025-01-25

## 2025-01-24 RX ORDER — ASPIRIN 325 MG
81 TABLET ORAL DAILY
Refills: 0 | Status: DISCONTINUED | OUTPATIENT
Start: 2025-01-24 | End: 2025-01-25

## 2025-01-24 RX ORDER — FINASTERIDE 1 MG/1
1 TABLET, FILM COATED ORAL
Refills: 0 | DISCHARGE

## 2025-01-24 RX ORDER — LOSARTAN POTASSIUM 100 MG/1
0.5 TABLET, FILM COATED ORAL
Refills: 0 | DISCHARGE

## 2025-01-24 RX ORDER — SENNA 187 MG
1 TABLET ORAL
Refills: 0 | DISCHARGE

## 2025-01-24 RX ORDER — ONDANSETRON HCL/PF 4 MG/2 ML
4 VIAL (ML) INJECTION EVERY 8 HOURS
Refills: 0 | Status: DISCONTINUED | OUTPATIENT
Start: 2025-01-24 | End: 2025-01-25

## 2025-01-24 RX ORDER — ENOXAPARIN SODIUM 100 MG/ML
40 INJECTION SUBCUTANEOUS EVERY 24 HOURS
Refills: 0 | Status: DISCONTINUED | OUTPATIENT
Start: 2025-01-24 | End: 2025-01-25

## 2025-01-24 RX ORDER — TAMSULOSIN HYDROCHLORIDE 0.4 MG/1
0.4 CAPSULE ORAL AT BEDTIME
Refills: 0 | Status: DISCONTINUED | OUTPATIENT
Start: 2025-01-24 | End: 2025-01-25

## 2025-01-24 RX ORDER — SODIUM CHLORIDE 9 G/1000ML
1000 INJECTION, SOLUTION INTRAVENOUS ONCE
Refills: 0 | Status: COMPLETED | OUTPATIENT
Start: 2025-01-24 | End: 2025-01-24

## 2025-01-24 RX ORDER — SENNA 187 MG
1 TABLET ORAL DAILY
Refills: 0 | Status: DISCONTINUED | OUTPATIENT
Start: 2025-01-24 | End: 2025-01-25

## 2025-01-24 RX ORDER — SODIUM CHLORIDE 9 G/1000ML
1000 INJECTION, SOLUTION INTRAVENOUS
Refills: 0 | Status: DISCONTINUED | OUTPATIENT
Start: 2025-01-24 | End: 2025-01-25

## 2025-01-24 RX ADMIN — SODIUM CHLORIDE 1000 MILLILITER(S): 9 INJECTION, SOLUTION INTRAVENOUS at 18:23

## 2025-01-24 RX ADMIN — ATORVASTATIN CALCIUM 80 MILLIGRAM(S): 80 TABLET, FILM COATED ORAL at 22:17

## 2025-01-24 RX ADMIN — TAMSULOSIN HYDROCHLORIDE 0.4 MILLIGRAM(S): 0.4 CAPSULE ORAL at 22:17

## 2025-01-25 ENCOUNTER — RESULT REVIEW (OUTPATIENT)
Age: 80
End: 2025-01-25

## 2025-01-25 ENCOUNTER — TRANSCRIPTION ENCOUNTER (OUTPATIENT)
Age: 80
End: 2025-01-25

## 2025-01-25 VITALS
DIASTOLIC BLOOD PRESSURE: 80 MMHG | SYSTOLIC BLOOD PRESSURE: 134 MMHG | OXYGEN SATURATION: 100 % | TEMPERATURE: 97 F | RESPIRATION RATE: 18 BRPM | HEART RATE: 57 BPM

## 2025-01-25 DIAGNOSIS — R73.03 PREDIABETES: ICD-10-CM

## 2025-01-25 LAB
A1C WITH ESTIMATED AVERAGE GLUCOSE RESULT: 6.2 % — HIGH (ref 4–5.6)
ALBUMIN SERPL ELPH-MCNC: 3.1 G/DL — LOW (ref 3.5–5)
ALP SERPL-CCNC: 81 U/L — SIGNIFICANT CHANGE UP (ref 40–120)
ALT FLD-CCNC: 26 U/L DA — SIGNIFICANT CHANGE UP (ref 10–60)
ANION GAP SERPL CALC-SCNC: 8 MMOL/L — SIGNIFICANT CHANGE UP (ref 5–17)
APPEARANCE UR: CLEAR — SIGNIFICANT CHANGE UP
AST SERPL-CCNC: 22 U/L — SIGNIFICANT CHANGE UP (ref 10–40)
BASOPHILS # BLD AUTO: 0.03 K/UL — SIGNIFICANT CHANGE UP (ref 0–0.2)
BASOPHILS NFR BLD AUTO: 0.6 % — SIGNIFICANT CHANGE UP (ref 0–2)
BILIRUB SERPL-MCNC: 1.7 MG/DL — HIGH (ref 0.2–1.2)
BILIRUB UR-MCNC: NEGATIVE — SIGNIFICANT CHANGE UP
BUN SERPL-MCNC: 26 MG/DL — HIGH (ref 7–18)
CALCIUM SERPL-MCNC: 9.1 MG/DL — SIGNIFICANT CHANGE UP (ref 8.4–10.5)
CHLORIDE SERPL-SCNC: 112 MMOL/L — HIGH (ref 96–108)
CHOLEST SERPL-MCNC: 94 MG/DL — SIGNIFICANT CHANGE UP
CO2 SERPL-SCNC: 24 MMOL/L — SIGNIFICANT CHANGE UP (ref 22–31)
COLOR SPEC: YELLOW — SIGNIFICANT CHANGE UP
CREAT SERPL-MCNC: 1.01 MG/DL — SIGNIFICANT CHANGE UP (ref 0.5–1.3)
DIFF PNL FLD: NEGATIVE — SIGNIFICANT CHANGE UP
EGFR: 76 ML/MIN/1.73M2 — SIGNIFICANT CHANGE UP
EGFR: 76 ML/MIN/1.73M2 — SIGNIFICANT CHANGE UP
EOSINOPHIL # BLD AUTO: 0.06 K/UL — SIGNIFICANT CHANGE UP (ref 0–0.5)
EOSINOPHIL NFR BLD AUTO: 1.2 % — SIGNIFICANT CHANGE UP (ref 0–6)
ESTIMATED AVERAGE GLUCOSE: 131 MG/DL — HIGH (ref 68–114)
GLUCOSE SERPL-MCNC: 123 MG/DL — HIGH (ref 70–99)
GLUCOSE UR QL: NEGATIVE MG/DL — SIGNIFICANT CHANGE UP
HCT VFR BLD CALC: 38.5 % — LOW (ref 39–50)
HDLC SERPL-MCNC: 53 MG/DL — SIGNIFICANT CHANGE UP
HGB BLD-MCNC: 12.8 G/DL — LOW (ref 13–17)
IMM GRANULOCYTES NFR BLD AUTO: 0.2 % — SIGNIFICANT CHANGE UP (ref 0–0.9)
KETONES UR-MCNC: NEGATIVE MG/DL — SIGNIFICANT CHANGE UP
LDLC SERPL-MCNC: 29 MG/DL — SIGNIFICANT CHANGE UP
LEUKOCYTE ESTERASE UR-ACNC: NEGATIVE — SIGNIFICANT CHANGE UP
LIPID PNL WITH DIRECT LDL SERPL: 29 MG/DL — SIGNIFICANT CHANGE UP
LYMPHOCYTES # BLD AUTO: 1.89 K/UL — SIGNIFICANT CHANGE UP (ref 1–3.3)
LYMPHOCYTES # BLD AUTO: 39.3 % — SIGNIFICANT CHANGE UP (ref 13–44)
MAGNESIUM SERPL-MCNC: 2.2 MG/DL — SIGNIFICANT CHANGE UP (ref 1.6–2.6)
MCHC RBC-ENTMCNC: 30 PG — SIGNIFICANT CHANGE UP (ref 27–34)
MCHC RBC-ENTMCNC: 33.2 G/DL — SIGNIFICANT CHANGE UP (ref 32–36)
MCV RBC AUTO: 90.2 FL — SIGNIFICANT CHANGE UP (ref 80–100)
MONOCYTES # BLD AUTO: 0.6 K/UL — SIGNIFICANT CHANGE UP (ref 0–0.9)
MONOCYTES NFR BLD AUTO: 12.5 % — SIGNIFICANT CHANGE UP (ref 2–14)
NEUTROPHILS # BLD AUTO: 2.22 K/UL — SIGNIFICANT CHANGE UP (ref 1.8–7.4)
NEUTROPHILS NFR BLD AUTO: 46.2 % — SIGNIFICANT CHANGE UP (ref 43–77)
NITRITE UR-MCNC: NEGATIVE — SIGNIFICANT CHANGE UP
NONHDLC SERPL-MCNC: 41 MG/DL — SIGNIFICANT CHANGE UP
NRBC # BLD: 0 /100 WBCS — SIGNIFICANT CHANGE UP (ref 0–0)
NRBC BLD-RTO: 0 /100 WBCS — SIGNIFICANT CHANGE UP (ref 0–0)
PH UR: 6 — SIGNIFICANT CHANGE UP (ref 5–8)
PHOSPHATE SERPL-MCNC: 3.3 MG/DL — SIGNIFICANT CHANGE UP (ref 2.5–4.5)
PLATELET # BLD AUTO: 109 K/UL — LOW (ref 150–400)
POTASSIUM SERPL-MCNC: 3.8 MMOL/L — SIGNIFICANT CHANGE UP (ref 3.5–5.3)
POTASSIUM SERPL-SCNC: 3.8 MMOL/L — SIGNIFICANT CHANGE UP (ref 3.5–5.3)
PROT SERPL-MCNC: 6.1 G/DL — SIGNIFICANT CHANGE UP (ref 6–8.3)
PROT UR-MCNC: NEGATIVE MG/DL — SIGNIFICANT CHANGE UP
RBC # BLD: 4.27 M/UL — SIGNIFICANT CHANGE UP (ref 4.2–5.8)
RBC # FLD: 14.1 % — SIGNIFICANT CHANGE UP (ref 10.3–14.5)
SODIUM SERPL-SCNC: 144 MMOL/L — SIGNIFICANT CHANGE UP (ref 135–145)
SP GR SPEC: 1.01 — SIGNIFICANT CHANGE UP (ref 1–1.03)
TRIGL SERPL-MCNC: 43 MG/DL — SIGNIFICANT CHANGE UP
UROBILINOGEN FLD QL: 0.2 MG/DL — SIGNIFICANT CHANGE UP (ref 0.2–1)
WBC # BLD: 4.81 K/UL — SIGNIFICANT CHANGE UP (ref 3.8–10.5)
WBC # FLD AUTO: 4.81 K/UL — SIGNIFICANT CHANGE UP (ref 3.8–10.5)

## 2025-01-25 PROCEDURE — 93306 TTE W/DOPPLER COMPLETE: CPT

## 2025-01-25 PROCEDURE — 84100 ASSAY OF PHOSPHORUS: CPT

## 2025-01-25 PROCEDURE — 83735 ASSAY OF MAGNESIUM: CPT

## 2025-01-25 PROCEDURE — 71045 X-RAY EXAM CHEST 1 VIEW: CPT

## 2025-01-25 PROCEDURE — 83036 HEMOGLOBIN GLYCOSYLATED A1C: CPT

## 2025-01-25 PROCEDURE — 80061 LIPID PANEL: CPT

## 2025-01-25 PROCEDURE — 36415 COLL VENOUS BLD VENIPUNCTURE: CPT

## 2025-01-25 PROCEDURE — 97162 PT EVAL MOD COMPLEX 30 MIN: CPT

## 2025-01-25 PROCEDURE — 72125 CT NECK SPINE W/O DYE: CPT | Mod: MC

## 2025-01-25 PROCEDURE — 93005 ELECTROCARDIOGRAM TRACING: CPT

## 2025-01-25 PROCEDURE — 70450 CT HEAD/BRAIN W/O DYE: CPT | Mod: MC

## 2025-01-25 PROCEDURE — 99285 EMERGENCY DEPT VISIT HI MDM: CPT

## 2025-01-25 PROCEDURE — 84484 ASSAY OF TROPONIN QUANT: CPT

## 2025-01-25 PROCEDURE — 80053 COMPREHEN METABOLIC PANEL: CPT

## 2025-01-25 PROCEDURE — 85025 COMPLETE CBC W/AUTO DIFF WBC: CPT

## 2025-01-25 PROCEDURE — 72192 CT PELVIS W/O DYE: CPT | Mod: MC

## 2025-01-25 PROCEDURE — 81003 URINALYSIS AUTO W/O SCOPE: CPT

## 2025-01-25 PROCEDURE — 72131 CT LUMBAR SPINE W/O DYE: CPT | Mod: MC

## 2025-01-25 PROCEDURE — 87637 SARSCOV2&INF A&B&RSV AMP PRB: CPT

## 2025-01-25 RX ORDER — LIDOCAINE HYDROCHLORIDE 20 MG/ML
1 JELLY TOPICAL ONCE
Refills: 0 | Status: COMPLETED | OUTPATIENT
Start: 2025-01-25 | End: 2025-01-25

## 2025-01-25 RX ORDER — ACETAMINOPHEN 500 MG/5ML
1000 LIQUID (ML) ORAL ONCE
Refills: 0 | Status: DISCONTINUED | OUTPATIENT
Start: 2025-01-25 | End: 2025-01-25

## 2025-01-25 RX ORDER — TAMSULOSIN HYDROCHLORIDE 0.4 MG/1
1 CAPSULE ORAL
Refills: 0 | DISCHARGE

## 2025-01-25 RX ADMIN — ENOXAPARIN SODIUM 40 MILLIGRAM(S): 100 INJECTION SUBCUTANEOUS at 06:18

## 2025-01-25 RX ADMIN — Medication 81 MILLIGRAM(S): at 13:22

## 2025-01-25 RX ADMIN — LIDOCAINE HYDROCHLORIDE 1 PATCH: 20 JELLY TOPICAL at 13:22

## 2025-01-25 RX ADMIN — LIDOCAINE HYDROCHLORIDE 1 PATCH: 20 JELLY TOPICAL at 18:38

## 2025-01-25 RX ADMIN — FINASTERIDE 5 MILLIGRAM(S): 1 TABLET, FILM COATED ORAL at 13:22

## 2025-01-25 RX ADMIN — Medication 1 TABLET(S): at 13:21

## 2025-03-06 ENCOUNTER — APPOINTMENT (OUTPATIENT)
Dept: UROLOGY | Facility: CLINIC | Age: 80
End: 2025-03-06
Payer: MEDICARE

## 2025-03-06 VITALS
HEART RATE: 59 BPM | TEMPERATURE: 96.8 F | SYSTOLIC BLOOD PRESSURE: 140 MMHG | OXYGEN SATURATION: 97 % | DIASTOLIC BLOOD PRESSURE: 85 MMHG

## 2025-03-06 DIAGNOSIS — R33.9 RETENTION OF URINE, UNSPECIFIED: ICD-10-CM

## 2025-03-06 PROCEDURE — 51798 US URINE CAPACITY MEASURE: CPT

## 2025-03-06 PROCEDURE — G2211 COMPLEX E/M VISIT ADD ON: CPT

## 2025-03-06 PROCEDURE — 99213 OFFICE O/P EST LOW 20 MIN: CPT

## 2025-03-10 LAB
APPEARANCE: CLEAR
BACTERIA UR CULT: NORMAL
BACTERIA: NEGATIVE /HPF
BILIRUBIN URINE: NEGATIVE
BLOOD URINE: NEGATIVE
CAST: 1 /LPF
COLOR: YELLOW
EPITHELIAL CELLS: 0 /HPF
GLUCOSE QUALITATIVE U: NEGATIVE MG/DL
KETONES URINE: ABNORMAL MG/DL
LEUKOCYTE ESTERASE URINE: NEGATIVE
MICROSCOPIC-UA: NORMAL
NITRITE URINE: NEGATIVE
PH URINE: 5.5
PROTEIN URINE: NEGATIVE MG/DL
RED BLOOD CELLS URINE: 1 /HPF
SPECIFIC GRAVITY URINE: 1.02
UROBILINOGEN URINE: 0.2 MG/DL
WHITE BLOOD CELLS URINE: 0 /HPF

## 2025-06-11 ENCOUNTER — APPOINTMENT (OUTPATIENT)
Dept: UROLOGY | Facility: CLINIC | Age: 80
End: 2025-06-11

## 2025-08-25 ENCOUNTER — NON-APPOINTMENT (OUTPATIENT)
Age: 80
End: 2025-08-25

## 2025-08-27 ENCOUNTER — APPOINTMENT (OUTPATIENT)
Dept: UROLOGY | Facility: CLINIC | Age: 80
End: 2025-08-27

## 2025-08-27 VITALS
HEIGHT: 64 IN | OXYGEN SATURATION: 96 % | BODY MASS INDEX: 16.56 KG/M2 | HEART RATE: 55 BPM | WEIGHT: 97 LBS | SYSTOLIC BLOOD PRESSURE: 168 MMHG | DIASTOLIC BLOOD PRESSURE: 98 MMHG | TEMPERATURE: 98.1 F

## 2025-08-27 DIAGNOSIS — R31.0 GROSS HEMATURIA: ICD-10-CM

## 2025-08-27 PROCEDURE — 99214 OFFICE O/P EST MOD 30 MIN: CPT

## 2025-08-27 PROCEDURE — G2211 COMPLEX E/M VISIT ADD ON: CPT

## 2025-08-28 LAB
ANION GAP SERPL CALC-SCNC: 20 MMOL/L
APPEARANCE: CLEAR
BACTERIA: NEGATIVE /HPF
BILIRUBIN URINE: NEGATIVE
BLOOD URINE: NEGATIVE
BUN SERPL-MCNC: 22 MG/DL
CALCIUM SERPL-MCNC: 10 MG/DL
CAST: 2 /LPF
CHLORIDE SERPL-SCNC: 102 MMOL/L
CO2 SERPL-SCNC: 17 MMOL/L
COLOR: YELLOW
CREAT SERPL-MCNC: 0.93 MG/DL
EGFRCR SERPLBLD CKD-EPI 2021: 83 ML/MIN/1.73M2
EPITHELIAL CELLS: 1 /HPF
GLUCOSE QUALITATIVE U: NEGATIVE MG/DL
GLUCOSE SERPL-MCNC: 57 MG/DL
KETONES URINE: NEGATIVE MG/DL
LEUKOCYTE ESTERASE URINE: NEGATIVE
MICROSCOPIC-UA: NORMAL
NITRITE URINE: NEGATIVE
PH URINE: 5.5
POTASSIUM SERPL-SCNC: 4.6 MMOL/L
PROTEIN URINE: NEGATIVE MG/DL
RED BLOOD CELLS URINE: 0 /HPF
SODIUM SERPL-SCNC: 139 MMOL/L
SPECIFIC GRAVITY URINE: 1.01
URINE CYTOLOGY: NORMAL
UROBILINOGEN URINE: 0.2 MG/DL
WHITE BLOOD CELLS URINE: 0 /HPF

## 2025-08-29 LAB — BACTERIA UR CULT: NORMAL

## 2025-09-16 ENCOUNTER — APPOINTMENT (OUTPATIENT)
Dept: CT IMAGING | Facility: CLINIC | Age: 80
End: 2025-09-16
Payer: MEDICARE

## 2025-09-16 PROCEDURE — 74178 CT ABD&PLV WO CNTR FLWD CNTR: CPT

## 2025-09-17 ENCOUNTER — APPOINTMENT (OUTPATIENT)
Dept: UROLOGY | Facility: CLINIC | Age: 80
End: 2025-09-17